# Patient Record
Sex: MALE | Race: WHITE | NOT HISPANIC OR LATINO | URBAN - METROPOLITAN AREA
[De-identification: names, ages, dates, MRNs, and addresses within clinical notes are randomized per-mention and may not be internally consistent; named-entity substitution may affect disease eponyms.]

---

## 2024-01-30 ENCOUNTER — HOSPITAL ENCOUNTER (EMERGENCY)
Facility: HOSPITAL | Age: 29
End: 2024-01-31
Attending: EMERGENCY MEDICINE

## 2024-01-30 DIAGNOSIS — R45.851 SUICIDAL IDEATION: ICD-10-CM

## 2024-01-30 DIAGNOSIS — Z00.8 MEDICAL CLEARANCE FOR PSYCHIATRIC ADMISSION: ICD-10-CM

## 2024-01-30 DIAGNOSIS — F32.A DEPRESSIVE EPISODE: Primary | ICD-10-CM

## 2024-01-30 LAB
AMPHETAMINES SERPL QL SCN: NEGATIVE
BARBITURATES UR QL: NEGATIVE
BENZODIAZ UR QL: POSITIVE
COCAINE UR QL: NEGATIVE
ETHANOL EXG-MCNC: NORMAL MG/DL
METHADONE UR QL: NEGATIVE
OPIATES UR QL SCN: NEGATIVE
OXYCODONE+OXYMORPHONE UR QL SCN: NEGATIVE
PCP UR QL: NEGATIVE
THC UR QL: POSITIVE

## 2024-01-30 PROCEDURE — 99285 EMERGENCY DEPT VISIT HI MDM: CPT

## 2024-01-30 PROCEDURE — 82075 ASSAY OF BREATH ETHANOL: CPT

## 2024-01-30 PROCEDURE — 80307 DRUG TEST PRSMV CHEM ANLYZR: CPT

## 2024-01-30 PROCEDURE — 99285 EMERGENCY DEPT VISIT HI MDM: CPT | Performed by: EMERGENCY MEDICINE

## 2024-01-30 RX ORDER — ALPRAZOLAM 0.25 MG/1
0.5 TABLET ORAL ONCE
Status: COMPLETED | OUTPATIENT
Start: 2024-01-30 | End: 2024-01-30

## 2024-01-30 RX ADMIN — ALPRAZOLAM 0.5 MG: 0.25 TABLET ORAL at 21:59

## 2024-01-31 ENCOUNTER — HOSPITAL ENCOUNTER (INPATIENT)
Facility: HOSPITAL | Age: 29
LOS: 40 days | Discharge: HOME | DRG: 751 | End: 2024-03-11
Attending: STUDENT IN AN ORGANIZED HEALTH CARE EDUCATION/TRAINING PROGRAM | Admitting: STUDENT IN AN ORGANIZED HEALTH CARE EDUCATION/TRAINING PROGRAM
Payer: COMMERCIAL

## 2024-01-31 VITALS
WEIGHT: 124.4 LBS | TEMPERATURE: 98 F | BODY MASS INDEX: 17.81 KG/M2 | OXYGEN SATURATION: 95 % | DIASTOLIC BLOOD PRESSURE: 72 MMHG | RESPIRATION RATE: 16 BRPM | HEIGHT: 70 IN | HEART RATE: 68 BPM | SYSTOLIC BLOOD PRESSURE: 130 MMHG

## 2024-01-31 DIAGNOSIS — Z00.8 MEDICAL CLEARANCE FOR PSYCHIATRIC ADMISSION: ICD-10-CM

## 2024-01-31 DIAGNOSIS — F41.9 ANXIETY: ICD-10-CM

## 2024-01-31 DIAGNOSIS — G47.00 INSOMNIA: ICD-10-CM

## 2024-01-31 DIAGNOSIS — F33.9 MAJOR DEPRESSIVE DISORDER, RECURRENT EPISODE (HCC): Primary | ICD-10-CM

## 2024-01-31 RX ORDER — MAGNESIUM HYDROXIDE/ALUMINUM HYDROXICE/SIMETHICONE 120; 1200; 1200 MG/30ML; MG/30ML; MG/30ML
30 SUSPENSION ORAL EVERY 4 HOURS PRN
Status: CANCELLED | OUTPATIENT
Start: 2024-01-31

## 2024-01-31 RX ORDER — HYDROXYZINE 50 MG/1
100 TABLET, FILM COATED ORAL
Status: DISCONTINUED | OUTPATIENT
Start: 2024-01-31 | End: 2024-03-11 | Stop reason: HOSPADM

## 2024-01-31 RX ORDER — AMOXICILLIN 250 MG
1 CAPSULE ORAL DAILY PRN
Status: DISCONTINUED | OUTPATIENT
Start: 2024-01-31 | End: 2024-03-11 | Stop reason: HOSPADM

## 2024-01-31 RX ORDER — HYDROXYZINE 50 MG/1
50 TABLET, FILM COATED ORAL
Status: DISCONTINUED | OUTPATIENT
Start: 2024-01-31 | End: 2024-03-11 | Stop reason: HOSPADM

## 2024-01-31 RX ORDER — POLYETHYLENE GLYCOL 3350 17 G/17G
17 POWDER, FOR SOLUTION ORAL DAILY PRN
Status: DISCONTINUED | OUTPATIENT
Start: 2024-01-31 | End: 2024-03-11 | Stop reason: HOSPADM

## 2024-01-31 RX ORDER — TRAZODONE HYDROCHLORIDE 50 MG/1
50 TABLET ORAL
Status: DISCONTINUED | OUTPATIENT
Start: 2024-01-31 | End: 2024-02-03

## 2024-01-31 RX ORDER — BENZTROPINE MESYLATE 1 MG/ML
1 INJECTION INTRAMUSCULAR; INTRAVENOUS
Status: CANCELLED | OUTPATIENT
Start: 2024-01-31

## 2024-01-31 RX ORDER — BENZTROPINE MESYLATE 1 MG/ML
1 INJECTION INTRAMUSCULAR; INTRAVENOUS
Status: DISCONTINUED | OUTPATIENT
Start: 2024-01-31 | End: 2024-03-11 | Stop reason: HOSPADM

## 2024-01-31 RX ORDER — HYDROXYZINE HYDROCHLORIDE 25 MG/1
25 TABLET, FILM COATED ORAL
Status: DISCONTINUED | OUTPATIENT
Start: 2024-01-31 | End: 2024-03-11 | Stop reason: HOSPADM

## 2024-01-31 RX ORDER — LORAZEPAM 2 MG/ML
2 INJECTION INTRAMUSCULAR EVERY 6 HOURS PRN
Status: CANCELLED | OUTPATIENT
Start: 2024-01-31

## 2024-01-31 RX ORDER — ALPRAZOLAM 0.25 MG/1
0.5 TABLET ORAL ONCE
Status: COMPLETED | OUTPATIENT
Start: 2024-01-31 | End: 2024-01-31

## 2024-01-31 RX ORDER — DIPHENHYDRAMINE HYDROCHLORIDE 50 MG/ML
50 INJECTION INTRAMUSCULAR; INTRAVENOUS EVERY 6 HOURS PRN
Status: CANCELLED | OUTPATIENT
Start: 2024-01-31

## 2024-01-31 RX ORDER — HYDROXYZINE HYDROCHLORIDE 25 MG/1
100 TABLET, FILM COATED ORAL
Status: CANCELLED | OUTPATIENT
Start: 2024-01-31

## 2024-01-31 RX ORDER — HALOPERIDOL 5 MG/1
5 TABLET ORAL
Status: DISCONTINUED | OUTPATIENT
Start: 2024-01-31 | End: 2024-03-11 | Stop reason: HOSPADM

## 2024-01-31 RX ORDER — HYDROXYZINE HYDROCHLORIDE 25 MG/1
50 TABLET, FILM COATED ORAL
Status: CANCELLED | OUTPATIENT
Start: 2024-01-31

## 2024-01-31 RX ORDER — HALOPERIDOL 1 MG/1
2.5 TABLET ORAL
Status: CANCELLED | OUTPATIENT
Start: 2024-01-31

## 2024-01-31 RX ORDER — HALOPERIDOL 5 MG/ML
5 INJECTION INTRAMUSCULAR
Status: DISCONTINUED | OUTPATIENT
Start: 2024-01-31 | End: 2024-03-11 | Stop reason: HOSPADM

## 2024-01-31 RX ORDER — BENZTROPINE MESYLATE 1 MG/1
1 TABLET ORAL
Status: DISCONTINUED | OUTPATIENT
Start: 2024-01-31 | End: 2024-03-11 | Stop reason: HOSPADM

## 2024-01-31 RX ORDER — ACETAMINOPHEN 325 MG/1
975 TABLET ORAL EVERY 6 HOURS PRN
Status: CANCELLED | OUTPATIENT
Start: 2024-01-31

## 2024-01-31 RX ORDER — LORAZEPAM 2 MG/ML
2 INJECTION INTRAMUSCULAR
Status: DISCONTINUED | OUTPATIENT
Start: 2024-01-31 | End: 2024-03-11 | Stop reason: HOSPADM

## 2024-01-31 RX ORDER — HYDROXYZINE HYDROCHLORIDE 25 MG/1
25 TABLET, FILM COATED ORAL
Status: CANCELLED | OUTPATIENT
Start: 2024-01-31

## 2024-01-31 RX ORDER — LORAZEPAM 2 MG/ML
2 INJECTION INTRAMUSCULAR
Status: CANCELLED | OUTPATIENT
Start: 2024-01-31

## 2024-01-31 RX ORDER — ACETAMINOPHEN 325 MG/1
975 TABLET ORAL EVERY 6 HOURS PRN
Status: DISCONTINUED | OUTPATIENT
Start: 2024-01-31 | End: 2024-03-11 | Stop reason: HOSPADM

## 2024-01-31 RX ORDER — BENZTROPINE MESYLATE 0.5 MG/1
1 TABLET ORAL
Status: CANCELLED | OUTPATIENT
Start: 2024-01-31

## 2024-01-31 RX ORDER — HALOPERIDOL 5 MG/1
5 TABLET ORAL
Status: CANCELLED | OUTPATIENT
Start: 2024-01-31

## 2024-01-31 RX ORDER — HALOPERIDOL 1 MG/1
1 TABLET ORAL EVERY 6 HOURS PRN
Status: CANCELLED | OUTPATIENT
Start: 2024-01-31

## 2024-01-31 RX ORDER — LORAZEPAM 2 MG/ML
1 INJECTION INTRAMUSCULAR
Status: DISCONTINUED | OUTPATIENT
Start: 2024-01-31 | End: 2024-03-11 | Stop reason: HOSPADM

## 2024-01-31 RX ORDER — ACETAMINOPHEN 325 MG/1
650 TABLET ORAL EVERY 4 HOURS PRN
Status: CANCELLED | OUTPATIENT
Start: 2024-01-31

## 2024-01-31 RX ORDER — BENZTROPINE MESYLATE 1 MG/ML
0.5 INJECTION INTRAMUSCULAR; INTRAVENOUS
Status: CANCELLED | OUTPATIENT
Start: 2024-01-31

## 2024-01-31 RX ORDER — MAGNESIUM HYDROXIDE/ALUMINUM HYDROXICE/SIMETHICONE 120; 1200; 1200 MG/30ML; MG/30ML; MG/30ML
30 SUSPENSION ORAL EVERY 4 HOURS PRN
Status: DISCONTINUED | OUTPATIENT
Start: 2024-01-31 | End: 2024-03-11 | Stop reason: HOSPADM

## 2024-01-31 RX ORDER — DIPHENHYDRAMINE HYDROCHLORIDE 50 MG/ML
50 INJECTION INTRAMUSCULAR; INTRAVENOUS EVERY 6 HOURS PRN
Status: DISCONTINUED | OUTPATIENT
Start: 2024-01-31 | End: 2024-03-11 | Stop reason: HOSPADM

## 2024-01-31 RX ORDER — HALOPERIDOL 5 MG/ML
5 INJECTION INTRAMUSCULAR
Status: CANCELLED | OUTPATIENT
Start: 2024-01-31

## 2024-01-31 RX ORDER — ACETAMINOPHEN 325 MG/1
650 TABLET ORAL EVERY 6 HOURS PRN
Status: DISCONTINUED | OUTPATIENT
Start: 2024-01-31 | End: 2024-03-11 | Stop reason: HOSPADM

## 2024-01-31 RX ORDER — BENZTROPINE MESYLATE 1 MG/ML
0.5 INJECTION INTRAMUSCULAR; INTRAVENOUS
Status: DISCONTINUED | OUTPATIENT
Start: 2024-01-31 | End: 2024-03-11 | Stop reason: HOSPADM

## 2024-01-31 RX ORDER — HALOPERIDOL 5 MG/ML
2.5 INJECTION INTRAMUSCULAR
Status: CANCELLED | OUTPATIENT
Start: 2024-01-31

## 2024-01-31 RX ORDER — PROPRANOLOL HYDROCHLORIDE 10 MG/1
10 TABLET ORAL EVERY 8 HOURS PRN
Status: DISCONTINUED | OUTPATIENT
Start: 2024-01-31 | End: 2024-03-11 | Stop reason: HOSPADM

## 2024-01-31 RX ORDER — ACETAMINOPHEN 325 MG/1
650 TABLET ORAL EVERY 4 HOURS PRN
Status: DISCONTINUED | OUTPATIENT
Start: 2024-01-31 | End: 2024-03-11 | Stop reason: HOSPADM

## 2024-01-31 RX ORDER — HALOPERIDOL 5 MG/ML
2.5 INJECTION INTRAMUSCULAR
Status: DISCONTINUED | OUTPATIENT
Start: 2024-01-31 | End: 2024-03-11 | Stop reason: HOSPADM

## 2024-01-31 RX ORDER — ACETAMINOPHEN 325 MG/1
650 TABLET ORAL EVERY 6 HOURS PRN
Status: CANCELLED | OUTPATIENT
Start: 2024-01-31

## 2024-01-31 RX ORDER — AMOXICILLIN 250 MG
1 CAPSULE ORAL DAILY PRN
Status: CANCELLED | OUTPATIENT
Start: 2024-01-31

## 2024-01-31 RX ORDER — HALOPERIDOL 1 MG/1
1 TABLET ORAL EVERY 6 HOURS PRN
Status: DISCONTINUED | OUTPATIENT
Start: 2024-01-31 | End: 2024-03-11 | Stop reason: HOSPADM

## 2024-01-31 RX ORDER — TRAZODONE HYDROCHLORIDE 50 MG/1
50 TABLET ORAL
Status: CANCELLED | OUTPATIENT
Start: 2024-01-31

## 2024-01-31 RX ORDER — POLYETHYLENE GLYCOL 3350 17 G/17G
17 POWDER, FOR SOLUTION ORAL DAILY PRN
Status: CANCELLED | OUTPATIENT
Start: 2024-01-31

## 2024-01-31 RX ORDER — PROPRANOLOL HYDROCHLORIDE 20 MG/1
10 TABLET ORAL EVERY 8 HOURS PRN
Status: CANCELLED | OUTPATIENT
Start: 2024-01-31

## 2024-01-31 RX ORDER — LORAZEPAM 2 MG/ML
1 INJECTION INTRAMUSCULAR
Status: CANCELLED | OUTPATIENT
Start: 2024-01-31

## 2024-01-31 RX ORDER — LORAZEPAM 2 MG/ML
2 INJECTION INTRAMUSCULAR EVERY 6 HOURS PRN
Status: DISCONTINUED | OUTPATIENT
Start: 2024-01-31 | End: 2024-03-11 | Stop reason: HOSPADM

## 2024-01-31 RX ADMIN — TRAZODONE HYDROCHLORIDE 50 MG: 50 TABLET ORAL at 21:14

## 2024-01-31 RX ADMIN — ALPRAZOLAM 0.5 MG: 0.25 TABLET ORAL at 09:52

## 2024-01-31 NOTE — PLAN OF CARE
Problem: Ineffective Coping  Goal: Identifies ineffective coping skills  Outcome: Not Progressing  Goal: Identifies healthy coping skills  Outcome: Not Progressing  Goal: Demonstrates healthy coping skills  Outcome: Not Progressing  Goal: Participates in unit activities  Description: Interventions:  - Provide therapeutic environment   - Provide required programming   - Redirect inappropriate behaviors   Outcome: Not Progressing  Goal: Patient/Family participate in treatment and DC plans  Description: Interventions:  - Provide therapeutic environment  Outcome: Not Progressing  Goal: Patient/Family verbalizes awareness of resources  Outcome: Not Progressing  Goal: Understands least restrictive measures  Description: Interventions:  - Utilize least restrictive behavior  Outcome: Not Progressing  Goal: Free from restraint events  Description: - Utilize least restrictive measures   - Provide behavioral interventions   - Redirect inappropriate behaviors   Outcome: Not Progressing     Problem: Depression  Goal: Treatment Goal: Demonstrate behavioral control of depressive symptoms, verbalize feelings of improved mood/affect, and adopt new coping skills prior to discharge  Outcome: Not Progressing  Goal: Verbalize thoughts and feelings  Description: Interventions:  - Assess and re-assess patient's level of risk   - Engage patient in 1:1 interactions, daily, for a minimum of 15 minutes   - Encourage patient to express feelings, fears, frustrations, hopes   Outcome: Not Progressing  Goal: Refrain from harming self  Description: Interventions:  - Monitor patient closely, per order   - Supervise medication ingestion, monitor effects and side effects   Outcome: Not Progressing  Goal: Refrain from isolation  Description: Interventions:  - Develop a trusting relationship   - Encourage socialization   Outcome: Not Progressing  Goal: Refrain from self-neglect  Outcome: Not Progressing  Goal: Attend and participate in unit  activities, including therapeutic, recreational, and educational groups  Description: Interventions:  - Provide therapeutic and educational activities daily, encourage attendance and participation, and document same in the medical record   Outcome: Not Progressing  Goal: Complete daily ADLs, including personal hygiene independently, as able  Description: Interventions:  - Observe, teach, and assist patient with ADLS  -  Monitor and promote a balance of rest/activity, with adequate nutrition and elimination   Outcome: Not Progressing     Problem: Anxiety  Goal: Anxiety is at manageable level  Description: Interventions:  - Assess and monitor patient's anxiety level.   - Monitor for signs and symptoms (heart palpitations, chest pain, shortness of breath, headaches, nausea, feeling jumpy, restlessness, irritable, apprehensive).   - Collaborate with interdisciplinary team and initiate plan and interventions as ordered.  - Warren patient to unit/surroundings  - Explain treatment plan  - Encourage participation in care  - Encourage verbalization of concerns/fears  - Identify coping mechanisms  - Assist in developing anxiety-reducing skills  - Administer/offer alternative therapies  - Limit or eliminate stimulants  Outcome: Not Progressing     Problem: Ineffective Coping  Goal: Cooperates with admission process  Description: Interventions:   - Complete admission process  Outcome: Completed
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
## 2024-01-31 NOTE — ED ATTENDING ATTESTATION
1/30/2024  I, Lucio Simeon MD, saw and evaluated the patient. I have discussed the patient with the resident/non-physician practitioner and agree with the resident's/non-physician practitioner's findings, Plan of Care, and MDM as documented in the resident's/non-physician practitioner's note, except where noted. All available labs and Radiology studies were reviewed.  I was present for key portions of any procedure(s) performed by the resident/non-physician practitioner and I was immediately available to provide assistance.       At this point I agree with the current assessment done in the Emergency Department.  I have conducted an independent evaluation of this patient a history and physical is as follows: Patient presents emergency department for help with overall dyspnea.  He states he may have a diagnosis of depression and anxiety.  He sometimes lives in [place] and sometimes lives here.  He denies any current plan for suicide or homicide.  He states he is frustrated with multiple aspects of his life.    Patient states he is here to establish psychiatric care and for help with resources in the area.    Crisis team saw and evaluated patient, patient agreed to inpatient psychiatric care through 201 process.    Patient medically clear for crisis evaluation and inpatient psychiatric care as requested.    Results Reviewed       Procedure Component Value Units Date/Time    Rapid drug screen, urine [[id number]]  (Abnormal) Collected: 01/30/24 1848    Lab Status: Final result Specimen: Urine, Clean Catch Updated: 01/30/24 1932     Amph/Meth UR Negative     Barbiturate Ur Negative     Benzodiazepine Urine Positive     Cocaine Urine Negative     Methadone Urine Negative     Opiate Urine Negative     PCP Ur Negative     THC Urine Positive     Oxycodone Urine Negative    Narrative:      Presumptive report. If requested, specimen will be sent to reference lab for confirmation.  FOR MEDICAL PURPOSES ONLY.   IF  CONFIRMATION NEEDED PLEASE CONTACT THE LAB WITHIN 5 DAYS.    Drug Screen Cutoff Levels:  AMPHETAMINE/METHAMPHETAMINES  1000 ng/mL  BARBITURATES     200 ng/mL  BENZODIAZEPINES     200 ng/mL  COCAINE      300 ng/mL  METHADONE      300 ng/mL  OPIATES      300 ng/mL  PHENCYCLIDINE     25 ng/mL  THC       50 ng/mL  OXYCODONE      100 ng/mL    POCT alcohol breath test [[id number]]  (Normal) Resulted: 01/30/24 1846    Lab Status: Final result Updated: 01/30/24 1846     EXTBreath Alcohol 0.00%              ED Course         Critical Care Time  Procedures

## 2024-01-31 NOTE — ED NOTES
[facility]  01/31/24 6403

## 2024-01-31 NOTE — ED NOTES
Patient is accepted at [facility]  Patient is accepted by Dr. Sabrina Davis, Intake    Transportation is arranged with [facility].         Nurse report is to be called to [phone] prior to patient transfer.

## 2024-01-31 NOTE — ED NOTES
Pt changed into paper scrubs. Belongings checked and put in SH locker.      Jose Hernández RN  01/30/24 3565

## 2024-01-31 NOTE — ED NOTES
[facility]    Nurse report is to be called to [phone] prior to patient transfer.      No

## 2024-01-31 NOTE — EMTALA/ACUTE CARE TRANSFER
[facility] EMERGENCY DEPARTMENT  [address] [facility]  [place] [address]  Dept: [phone]      EMTALA TRANSFER CONSENT    NAME Shaquille Ackerman                                         1995                              MRN [id number]    I have been informed of my rights regarding examination, treatment, and transfer   by Dr. Luis Enrique Fajardo DO    Benefits:      Risks:        Transfer Request   I acknowledge that my medical condition has been evaluated and explained to me by the emergency department physician or other qualified medical person and/or my attending physician who has recommended and offered to me further medical examination and treatment. I understand the Hospital's obligation with respect to the treatment and stabilization of my emergency medical condition. I nevertheless request to be transferred. I release the Hospital, the doctor, and any other persons caring for me from all responsibility or liability for any injury or ill effects that may result from my transfer and agree to accept all responsibility for the consequences of my choice to transfer, rather than receive stabilizing treatment at the Hospital. I understand that because the transfer is my request, my insurance may not provide reimbursement for the services.  The Hospital will assist and direct me and my family in how to make arrangements for transfer, but the hospital is not liable for any fees charged by the transport service.  In spite of this understanding, I refuse to consent to further medical examination and treatment which has been offered to me, and request transfer to  . I authorize the performance of emergency medical procedures and treatments upon me in both transit and upon arrival at the receiving facility.  Additionally, I authorize the release of any and all medical records to the receiving facility and request they be transported with me, if possible.    I authorize the performance of emergency  medical procedures and treatments upon me in both transit and upon arrival at the receiving facility.  Additionally, I authorize the release of any and all medical records to the receiving facility and request they be transported with me, if possible.  I understand that the safest mode of transportation during a medical emergency is an ambulance and that the Hospital advocates the use of this mode of transport. Risks of traveling to the receiving facility by car, including absence of medical control, life sustaining equipment, such as oxygen, and medical personnel has been explained to me and I fully understand them.    (WILLIE CORRECT BOX BELOW)  [  ]  I consent to the stated transfer and to be transported by ambulance/helicopter.  [  ]  I consent to the stated transfer, but refuse transportation by ambulance and accept full responsibility for my transportation by car.  I understand the risks of non-ambulance transfers and I exonerate the Hospital and its staff from any deterioration in my condition that results from this refusal.    X___________________________________________    DATE  24  TIME________  Signature of patient or legally responsible individual signing on patient behalf           RELATIONSHIP TO PATIENT_________________________          Provider Certification    NAME Shaquille Ackerman                                         1995                              MRN [id number]    A medical screening exam was performed on the above named patient.  Based on the examination:    Condition Necessitating Transfer The primary encounter diagnosis was Depressive episode. Diagnoses of Suicidal ideation and Medical clearance for psychiatric admission were also pertinent to this visit.    Patient Condition:      Reason for Transfer:      Transfer Requirements: Facility     Space available and qualified personnel available for treatment as acknowledged by    Agreed to accept transfer and to provide appropriate  medical treatment as acknowledged by          Appropriate medical records of the examination and treatment of the patient are provided at the time of transfer   STAFF INITIAL WHEN COMPLETED _______  Transfer will be performed by qualified personnel from    and appropriate transfer equipment as required, including the use of necessary and appropriate life support measures.    Provider Certification: I have examined the patient and explained the following risks and benefits of being transferred/refusing transfer to the patient/family:         Based on these reasonable risks and benefits to the patient and/or the unborn child(myra), and based upon the information available at the time of the patient’s examination, I certify that the medical benefits reasonably to be expected from the provision of appropriate medical treatments at another medical facility outweigh the increasing risks, if any, to the individual’s medical condition, and in the case of labor to the unborn child, from effecting the transfer.    X____________________________________________ DATE 01/31/24        TIME_______      ORIGINAL - SEND TO MEDICAL RECORDS   COPY - SEND WITH PATIENT DURING TRANSFER

## 2024-01-31 NOTE — ED NOTES
Patient wishes to take a shower and does not want any breakfast     Najma Chairez  01/31/24 0729

## 2024-01-31 NOTE — ED CARE HANDOFF
Emergency Department Sign Out Note        Sign out and transfer of care from Dr Atkins. See Separate Emergency Department note.     The patient, Shaquille Ackerman, was evaluated by the previous provider for intense rage, SI ideations.    Workup Completed:  201 signed  Medically cleared  Evaluated by crisis    ED Course / Workup Pending (followup):  Pending inpatient placement.                                   ED Course as of 02/04/24 0316 Wed Jan 31, 2024   0027 Sign out from Dr Atkins  27 yo male with High functionioning ASD, intense rage, presents with SI,    Signed 201. Crisis evaluated him and awaiting inpatient placement. Medically clear.     PRN Xanax     Procedures  Medical Decision Making  Pt was awaiting placement at end of my shift, and case was signed out by attending to Dr Fajardo.     Amount and/or Complexity of Data Reviewed  Labs: ordered.    Risk  Prescription drug management.            Disposition  Final diagnoses:   Depressive episode   Suicidal ideation     Time reflects when diagnosis was documented in both MDM as applicable and the Disposition within this note       Time User Action Codes Description Comment    1/30/2024 10:49 PM Beka Atkins [F32.A] Depressive episode     1/30/2024 10:50 PM Beka Atkins Add [R45.851] Suicidal ideation     1/31/2024 10:33 AM Zakia Resendez Add [Z00.8] Medical clearance for psychiatric admission           ED Disposition       ED Disposition   Transfer to Another Facility-In Network    Condition   --    Date/Time   Wed Jan 31, 2024 12:48 PM    Comment   Shaquille Ackerman should be transferred out to [place].               MD Documentation      Flowsheet Row Most Recent Value   Sending MD Dr. Lucio Simeon          Follow-up Information    None       There are no discharge medications for this patient.    No discharge procedures on file.       ED Provider  Electronically Signed by     Hope Kraft MD  02/04/24 0316

## 2024-01-31 NOTE — ED NOTES
Nancie Schoeneberger with [facility] called, requesting an update and to advise that the patient has been referred to [facility] for services.  She notes the patient is on the autism spectrum and had been involuntarily committed under the [place] Act while living in [place].
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
## 2024-02-01 PROBLEM — F33.9 MAJOR DEPRESSIVE DISORDER, RECURRENT EPISODE (HCC): Status: ACTIVE | Noted: 2024-02-01

## 2024-02-01 PROBLEM — F12.10 MARIJUANA ABUSE: Status: ACTIVE | Noted: 2024-02-01

## 2024-02-01 PROBLEM — R17 SERUM TOTAL BILIRUBIN ELEVATED: Status: ACTIVE | Noted: 2024-02-01

## 2024-02-01 PROBLEM — Z00.8 MEDICAL CLEARANCE FOR PSYCHIATRIC ADMISSION: Status: ACTIVE | Noted: 2024-02-01

## 2024-02-01 LAB
ALBUMIN SERPL BCP-MCNC: 4.5 G/DL (ref 3.5–5)
ALP SERPL-CCNC: 43 U/L (ref 34–104)
ALT SERPL W P-5'-P-CCNC: 12 U/L (ref 7–52)
ANION GAP SERPL CALCULATED.3IONS-SCNC: 9 MMOL/L
AST SERPL W P-5'-P-CCNC: 26 U/L (ref 13–39)
ATRIAL RATE: 64 BPM
BASOPHILS # BLD AUTO: 0.02 THOUSANDS/ÂΜL (ref 0–0.1)
BASOPHILS NFR BLD AUTO: 1 % (ref 0–1)
BILIRUB SERPL-MCNC: 1.47 MG/DL (ref 0.2–1)
BUN SERPL-MCNC: 16 MG/DL (ref 5–25)
CALCIUM SERPL-MCNC: 9.2 MG/DL (ref 8.4–10.2)
CHLORIDE SERPL-SCNC: 105 MMOL/L (ref 96–108)
CO2 SERPL-SCNC: 25 MMOL/L (ref 21–32)
CREAT SERPL-MCNC: 1.12 MG/DL (ref 0.6–1.3)
EOSINOPHIL # BLD AUTO: 0.01 THOUSAND/ÂΜL (ref 0–0.61)
EOSINOPHIL NFR BLD AUTO: 0 % (ref 0–6)
ERYTHROCYTE [DISTWIDTH] IN BLOOD BY AUTOMATED COUNT: 11.9 % (ref 11.6–15.1)
GFR SERPL CREATININE-BSD FRML MDRD: 87 ML/MIN/1.73SQ M
GLUCOSE P FAST SERPL-MCNC: 77 MG/DL (ref 65–99)
GLUCOSE SERPL-MCNC: 77 MG/DL (ref 65–140)
HCT VFR BLD AUTO: 44.4 % (ref 36.5–49.3)
HGB BLD-MCNC: 15.3 G/DL (ref 12–17)
IMM GRANULOCYTES # BLD AUTO: 0.01 THOUSAND/UL (ref 0–0.2)
IMM GRANULOCYTES NFR BLD AUTO: 0 % (ref 0–2)
LYMPHOCYTES # BLD AUTO: 1.14 THOUSANDS/ÂΜL (ref 0.6–4.47)
LYMPHOCYTES NFR BLD AUTO: 26 % (ref 14–44)
MCH RBC QN AUTO: 31.4 PG (ref 26.8–34.3)
MCHC RBC AUTO-ENTMCNC: 34.5 G/DL (ref 31.4–37.4)
MCV RBC AUTO: 91 FL (ref 82–98)
MONOCYTES # BLD AUTO: 0.37 THOUSAND/ÂΜL (ref 0.17–1.22)
MONOCYTES NFR BLD AUTO: 8 % (ref 4–12)
NEUTROPHILS # BLD AUTO: 2.87 THOUSANDS/ÂΜL (ref 1.85–7.62)
NEUTS SEG NFR BLD AUTO: 65 % (ref 43–75)
NRBC BLD AUTO-RTO: 0 /100 WBCS
P AXIS: 57 DEGREES
PLATELET # BLD AUTO: 177 THOUSANDS/UL (ref 149–390)
PMV BLD AUTO: 9.7 FL (ref 8.9–12.7)
POTASSIUM SERPL-SCNC: 3.9 MMOL/L (ref 3.5–5.3)
PR INTERVAL: 148 MS
PROT SERPL-MCNC: 6.6 G/DL (ref 6.4–8.4)
QRS AXIS: 19 DEGREES
QRSD INTERVAL: 94 MS
QT INTERVAL: 402 MS
QTC INTERVAL: 414 MS
RBC # BLD AUTO: 4.88 MILLION/UL (ref 3.88–5.62)
SODIUM SERPL-SCNC: 139 MMOL/L (ref 135–147)
T WAVE AXIS: 19 DEGREES
TSH SERPL DL<=0.05 MIU/L-ACNC: 0.97 UIU/ML (ref 0.45–4.5)
VENTRICULAR RATE: 64 BPM
VIT B12 SERPL-MCNC: 720 PG/ML (ref 180–914)
WBC # BLD AUTO: 4.42 THOUSAND/UL (ref 4.31–10.16)

## 2024-02-01 PROCEDURE — 80053 COMPREHEN METABOLIC PANEL: CPT

## 2024-02-01 PROCEDURE — 84443 ASSAY THYROID STIM HORMONE: CPT | Performed by: PSYCHIATRY & NEUROLOGY

## 2024-02-01 PROCEDURE — 82607 VITAMIN B-12: CPT

## 2024-02-01 PROCEDURE — 93005 ELECTROCARDIOGRAM TRACING: CPT

## 2024-02-01 PROCEDURE — 99223 1ST HOSP IP/OBS HIGH 75: CPT | Performed by: STUDENT IN AN ORGANIZED HEALTH CARE EDUCATION/TRAINING PROGRAM

## 2024-02-01 PROCEDURE — 99253 IP/OBS CNSLTJ NEW/EST LOW 45: CPT | Performed by: NURSE PRACTITIONER

## 2024-02-01 PROCEDURE — 82652 VIT D 1 25-DIHYDROXY: CPT

## 2024-02-01 PROCEDURE — 85025 COMPLETE CBC W/AUTO DIFF WBC: CPT

## 2024-02-01 PROCEDURE — 93010 ELECTROCARDIOGRAM REPORT: CPT | Performed by: STUDENT IN AN ORGANIZED HEALTH CARE EDUCATION/TRAINING PROGRAM

## 2024-02-01 RX ORDER — ESCITALOPRAM OXALATE 5 MG/1
5 TABLET ORAL DAILY
Status: DISCONTINUED | OUTPATIENT
Start: 2024-02-01 | End: 2024-02-03

## 2024-02-01 RX ADMIN — HYDROXYZINE HYDROCHLORIDE 100 MG: 50 TABLET, FILM COATED ORAL at 18:36

## 2024-02-01 RX ADMIN — TRAZODONE HYDROCHLORIDE 50 MG: 50 TABLET ORAL at 21:07

## 2024-02-01 RX ADMIN — ESCITALOPRAM OXALATE 5 MG: 5 TABLET, FILM COATED ORAL at 16:14

## 2024-02-01 RX ADMIN — HALOPERIDOL 5 MG: 5 TABLET ORAL at 18:36
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
## 2024-02-01 NOTE — NURSING NOTE
Pt appears anxious this morning, but is pleasant and cooperative upon approach. Pt is medication and meal compliant. Denies SI, HI, AH, and VH. Denies any needs at this time.
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
## 2024-02-01 NOTE — NURSING NOTE
Pt mainly isolative to room, out for needs only. Denies SI, HI and hallucinations. Appears depressed. No anxiety reported and appears calm on approach. Requested and given trazodone 50mg for sleep at 2114 and is currently in bed resting with eyes closed. Safety checks continue.

## 2024-02-01 NOTE — NURSING NOTE
"Pt started to scream in the arguello way and become tangential about \"his next steps, the failed health system, and to fuck society. \" Pt was also threatening stating \"I can hurt people if I want to.\" Broset Score 4 and Coronel Scale 29. PRN of PO 100mg atarax and PO 5mg haldol given @1836.  "

## 2024-02-01 NOTE — CONSULTS
[facility]  Consult  Name: Shaquille Ackerman 28 y.o. male I MRN: [id number]  Unit/Bed#: [facility] 346-02 I Date of Admission: 1/31/2024   Date of Service: 2/1/2024 I Hospital Day: 1    Inpatient consult for Medical Clearance for  patient  Consult performed by: MIRNA Gambino  Consult ordered by: Zakia Resendez,         Assessment/Plan   Medical clearance for psychiatric admission  Assessment & Plan  Patient is medically cleared for admission to [facility] and treatment of underlying psychiatric illness based on available results  No acute medical needs. Medicine will sign off. Please call with additional questions or concerns    Serum total bilirubin elevated  Assessment & Plan  T. bili elevated at 1.47  LFTs unremarkable.  Abdominal exam benign.  Will repeat a CMP in 2 days    Marijuana abuse  Assessment & Plan  UDS (+) for THC  Substance cessation education and counseling     * Major depressive disorder, recurrent episode (HCC)  Assessment & Plan  [facility]  Management per primary service            Recommendations for Discharge:  SLIM will sign off - please call with questions or concerns.  Follow up with PCP upon discharge.     Counseling / Coordination of Care Time: 30 minutes.  Greater than 50% of total time spent on patient counseling and coordination of care.    Collaboration of Care: Were Recommendations Directly Discussed with Primary Treatment Team? - Yes     History of Present Illness:    Shaquille Ackerman is a 28 y.o. male who is originally admitted to the psychiatric service due to suicide ideation and increased anxiety. We are consulted for medical clearance for psychiatric hospitalization and medical management.  Initially, patient presented to [facility] emergency department.  Patient was seen by crisis.  He noted that he lives with his mother.  They split their time between [place] and [place].  Patient noted he has more support and psychiatric support  "in [place].  Patient prefers to be in [place].  Patient had \"a meltdown at home.\"  Patient signed a 201 and was transferred to [facility] for inpatient behavioral health treatment.    Review of Systems:    Review of Systems   Constitutional:  Negative for appetite change and chills.   HENT:  Negative for congestion and sore throat.    Eyes:  Negative for visual disturbance.   Respiratory:  Negative for cough and shortness of breath.    Cardiovascular:  Negative for chest pain.   Gastrointestinal:  Negative for abdominal pain, constipation, diarrhea, nausea and vomiting.   Genitourinary:  Negative for difficulty urinating.   Musculoskeletal:  Negative for gait problem.   Skin:  Negative for wound.   Neurological:  Negative for dizziness, light-headedness and headaches.   Psychiatric/Behavioral:  The patient is nervous/anxious.        Past Medical and Surgical History:     Past Medical History:   Diagnosis Date    Autism spectrum disorder        No past surgical history on file.    Meds/Allergies:    all medications and allergies reviewed    Allergies: No Known Allergies    Social History:     Marital Status: Single    Substance Use History:   Social History     Substance and Sexual Activity   Alcohol Use Yes    Alcohol/week: 2.0 standard drinks of alcohol    Types: 1 Cans of beer, 1 Shots of liquor per week    Comment: Socially     Social History     Tobacco Use   Smoking Status Never    Passive exposure: Never   Smokeless Tobacco Never     Social History     Substance and Sexual Activity   Drug Use Yes    Types: Marijuana, Benzodiazepines       Family History:    Family History   Problem Relation Age of Onset    Bipolar disorder Father     Schizophrenia Father        Physical Exam:     Vitals:   Blood Pressure: 148/62 (02/01/24 0739)  Pulse: 74 (02/01/24 0739)  Temperature: 97.9 °F (36.6 °C) (02/01/24 0739)  Temp Source: Temporal (02/01/24 0739)  Respirations: 16 (02/01/24 0739)  Height: 5' 10\" (177.8 cm) " "(01/31/24 1529)  Weight - Scale: 53.5 kg (118 lb) (01/31/24 1529)  SpO2: 98 % (02/01/24 0739)    Physical Exam  Vitals and nursing note reviewed.   Constitutional:       General: He is not in acute distress.     Appearance: He is not toxic-appearing or diaphoretic.   HENT:      Head: Normocephalic.      Mouth/Throat:      Mouth: Mucous membranes are moist.   Eyes:      Conjunctiva/sclera: Conjunctivae normal.   Cardiovascular:      Rate and Rhythm: Normal rate.   Pulmonary:      Effort: Pulmonary effort is normal.      Breath sounds: Normal breath sounds.   Abdominal:      General: Bowel sounds are normal.      Palpations: Abdomen is soft.   Musculoskeletal:         General: Normal range of motion.      Cervical back: Normal range of motion.      Right lower leg: No edema.      Left lower leg: No edema.   Skin:     General: Skin is warm and dry.      Capillary Refill: Capillary refill takes less than 2 seconds.   Neurological:      Mental Status: He is alert and oriented to person, place, and time. Mental status is at baseline.   Psychiatric:         Mood and Affect: Mood is anxious.         Speech: Speech normal.         Behavior: Behavior is cooperative.         Additional Data:     Lab Results:     Results from last 7 days   Lab Units 02/01/24  0648   WBC Thousand/uL 4.42   HEMOGLOBIN g/dL 15.3   HEMATOCRIT % 44.4   PLATELETS Thousands/uL 177   NEUTROS PCT % 65   LYMPHS PCT % 26   MONOS PCT % 8   EOS PCT % 0     Results from last 7 days   Lab Units 02/01/24  0648   SODIUM mmol/L 139   POTASSIUM mmol/L 3.9   CHLORIDE mmol/L 105   CO2 mmol/L 25   BUN mg/dL 16   CREATININE mg/dL 1.12   ANION GAP mmol/L 9   CALCIUM mg/dL 9.2   ALBUMIN g/dL 4.5   TOTAL BILIRUBIN mg/dL 1.47*   ALK PHOS U/L 43   ALT U/L 12   AST U/L 26   GLUCOSE RANDOM mg/dL 77             No results found for: \"HGBA1C\"            Imaging: I have personally reviewed pertinent reports.      No orders to display       EKG, Pathology, and Other Studies " Reviewed on Admission:   EKG: see above documentation    ** Please Note: This note has been constructed using a voice recognition system. **

## 2024-02-01 NOTE — TREATMENT PLAN
TREATMENT PLAN REVIEW - Behavioral Health Shaquille Ackerman 28 y.o. 1995 male MRN: [id number]    [facility] 3B [facility] Room / Bed: [facility] 346/[facility] 346-02 Encounter: [id number]          Admit Date/Time:  1/31/2024  3:09 PM    Treatment Team:   MD Viki Cota LPC Dabriel L Ramos, RN Sarah Alderfer Sean Washburn Karissa Marie Kormandy, CRNP Tara Nickens Jennifer King Tracey Renfer, COTA    Diagnosis: Principal Problem:    Major depressive disorder, recurrent episode (HCC)      Patient Strengths/Assets: ability for insight, cooperative, communication skills, motivated, negotiates basic needs, patient is on a voluntary commitment, patient is willing to work on problems, reasoning ability    Patient Barriers/Limitations: difficulty adapting, financial instability, limited education, limited support system, marital/family conflict, poor past treatment response, self-care deficit    Short Term Goals: decrease in depressive symptoms, decrease in anxiety symptoms, decrease in suicidal thoughts, decrease in self abusive behaviors, ability to stay safe on the unit, ability to stay free of restraints, improvement in ability to express basic needs, improvement in insight, improvement in reasoning ability, improvement in self care, sleep improvement, improvement in appetite, mood stabilization, increase in group attendance, increase in socialization with peers on the unit, acceptance of need for psychiatric treatment, acceptance of psychiatric medications    Long Term Goals: improvement in depression, improvement in anxiety, free of suicidal thoughts, free of homicidal thoughts, no self abusive behavior, improvement in reasoning ability, improved insight, able to express basic needs, acceptance of need for psychiatric medications, acceptance of need for psychiatric treatment, acceptance of need for psychiatric follow up after discharge, acceptance of  psychiatric diagnosis, adequate self care, adequate sleep, adequate appetite, appropriate interaction with peers, stable living arrangements upon discharge    Progress Towards Goals: starting psychiatric medications as prescribed, progressing, insight is improving, no longer suicidal    Recommended Treatment: medication management, patient medication education, group therapy, milieu therapy, continued Behavioral Health psychiatric evaluation/assessment process    Treatment Frequency: daily medication monitoring, group and milieu therapy daily, monitoring through interdisciplinary rounds, monitoring through weekly patient care conferences    Expected Discharge Date:  2/12/24    Discharge Plan: discharge to home, referral for outpatient medication management with a psychiatrist, referral for outpatient psychotherapy    Treatment Plan Created/Updated By: Gregory Bennett MD

## 2024-02-01 NOTE — PLAN OF CARE
Problem: Ineffective Coping  Goal: Identifies healthy coping skills  Outcome: Progressing  Goal: Demonstrates healthy coping skills  Outcome: Progressing  Goal: Patient/Family participate in treatment and DC plans  Description: Interventions:  - Provide therapeutic environment  Outcome: Progressing  Goal: Patient/Family verbalizes awareness of resources  Outcome: Progressing  Goal: Understands least restrictive measures  Description: Interventions:  - Utilize least restrictive behavior  Outcome: Progressing  Goal: Free from restraint events  Description: - Utilize least restrictive measures   - Provide behavioral interventions   - Redirect inappropriate behaviors   Outcome: Progressing     Problem: Depression  Goal: Treatment Goal: Demonstrate behavioral control of depressive symptoms, verbalize feelings of improved mood/affect, and adopt new coping skills prior to discharge  Outcome: Progressing  Goal: Verbalize thoughts and feelings  Description: Interventions:  - Assess and re-assess patient's level of risk   - Engage patient in 1:1 interactions, daily, for a minimum of 15 minutes   - Encourage patient to express feelings, fears, frustrations, hopes   Outcome: Progressing  Goal: Refrain from harming self  Description: Interventions:  - Monitor patient closely, per order   - Supervise medication ingestion, monitor effects and side effects   Outcome: Progressing  Goal: Refrain from isolation  Description: Interventions:  - Develop a trusting relationship   - Encourage socialization   Outcome: Progressing  Goal: Refrain from self-neglect  Outcome: Progressing  Goal: Complete daily ADLs, including personal hygiene independently, as able  Description: Interventions:  - Observe, teach, and assist patient with ADLS  -  Monitor and promote a balance of rest/activity, with adequate nutrition and elimination   Outcome: Progressing     Problem: Anxiety  Goal: Anxiety is at manageable level  Description: Interventions:  -  Assess and monitor patient's anxiety level.   - Monitor for signs and symptoms (heart palpitations, chest pain, shortness of breath, headaches, nausea, feeling jumpy, restlessness, irritable, apprehensive).   - Collaborate with interdisciplinary team and initiate plan and interventions as ordered.  - [place] patient to unit/surroundings  - Explain treatment plan  - Encourage participation in care  - Encourage verbalization of concerns/fears  - Identify coping mechanisms  - Assist in developing anxiety-reducing skills  - Administer/offer alternative therapies  - Limit or eliminate stimulants  Outcome: Progressing     Problem: Ineffective Coping  Goal: Identifies ineffective coping skills  Outcome: Not Progressing  Goal: Participates in unit activities  Description: Interventions:  - Provide therapeutic environment   - Provide required programming   - Redirect inappropriate behaviors   Outcome: Not Progressing     Problem: Depression  Goal: Attend and participate in unit activities, including therapeutic, recreational, and educational groups  Description: Interventions:  - Provide therapeutic and educational activities daily, encourage attendance and participation, and document same in the medical record   Outcome: Not Progressing

## 2024-02-02 LAB — 1,25(OH)2D3 SERPL-MCNC: 45.9 PG/ML (ref 24.8–81.5)

## 2024-02-02 PROCEDURE — 99232 SBSQ HOSP IP/OBS MODERATE 35: CPT | Performed by: PSYCHIATRY & NEUROLOGY

## 2024-02-02 RX ADMIN — ESCITALOPRAM OXALATE 5 MG: 5 TABLET, FILM COATED ORAL at 09:19

## 2024-02-02 RX ADMIN — TRAZODONE HYDROCHLORIDE 50 MG: 50 TABLET ORAL at 22:07

## 2024-02-02 NOTE — PLAN OF CARE
Problem: Ineffective Coping  Goal: Identifies ineffective coping skills  Outcome: Progressing  Goal: Identifies healthy coping skills  Outcome: Progressing  Goal: Demonstrates healthy coping skills  Outcome: Progressing  Goal: Participates in unit activities  Description: Interventions:  - Provide therapeutic environment   - Provide required programming   - Redirect inappropriate behaviors   Outcome: Progressing  Goal: Patient/Family participate in treatment and DC plans  Description: Interventions:  - Provide therapeutic environment  Outcome: Progressing  Goal: Patient/Family verbalizes awareness of resources  Outcome: Progressing  Goal: Understands least restrictive measures  Description: Interventions:  - Utilize least restrictive behavior  Outcome: Progressing  Goal: Free from restraint events  Description: - Utilize least restrictive measures   - Provide behavioral interventions   - Redirect inappropriate behaviors   Outcome: Progressing     Problem: Depression  Goal: Treatment Goal: Demonstrate behavioral control of depressive symptoms, verbalize feelings of improved mood/affect, and adopt new coping skills prior to discharge  Outcome: Progressing  Goal: Verbalize thoughts and feelings  Description: Interventions:  - Assess and re-assess patient's level of risk   - Engage patient in 1:1 interactions, daily, for a minimum of 15 minutes   - Encourage patient to express feelings, fears, frustrations, hopes   Outcome: Progressing  Goal: Refrain from harming self  Description: Interventions:  - Monitor patient closely, per order   - Supervise medication ingestion, monitor effects and side effects   Outcome: Progressing  Goal: Refrain from isolation  Description: Interventions:  - Develop a trusting relationship   - Encourage socialization   Outcome: Progressing  Goal: Refrain from self-neglect  Outcome: Progressing  Goal: Attend and participate in unit activities, including therapeutic, recreational, and  educational groups  Description: Interventions:  - Provide therapeutic and educational activities daily, encourage attendance and participation, and document same in the medical record   Outcome: Progressing  Goal: Complete daily ADLs, including personal hygiene independently, as able  Description: Interventions:  - Observe, teach, and assist patient with ADLS  -  Monitor and promote a balance of rest/activity, with adequate nutrition and elimination   Outcome: Progressing     Problem: Anxiety  Goal: Anxiety is at manageable level  Description: Interventions:  - Assess and monitor patient's anxiety level.   - Monitor for signs and symptoms (heart palpitations, chest pain, shortness of breath, headaches, nausea, feeling jumpy, restlessness, irritable, apprehensive).   - Collaborate with interdisciplinary team and initiate plan and interventions as ordered.  - [place] patient to unit/surroundings  - Explain treatment plan  - Encourage participation in care  - Encourage verbalization of concerns/fears  - Identify coping mechanisms  - Assist in developing anxiety-reducing skills  - Administer/offer alternative therapies  - Limit or eliminate stimulants  Outcome: Progressing     Problem: Nutrition/Hydration-ADULT  Goal: Nutrient/Hydration intake appropriate for improving, restoring or maintaining nutritional needs  Description: Monitor and assess patient's nutrition/hydration status for malnutrition. Collaborate with interdisciplinary team and initiate plan and interventions as ordered.  Monitor patient's weight and dietary intake as ordered or per policy. Utilize nutrition screening tool and intervene as necessary. Determine patient's food preferences and provide high-protein, high-caloric foods as appropriate.     INTERVENTIONS:  - Monitor oral intake, urinary output, labs, and treatment plans  - Assess nutrition and hydration status and recommend course of action  - Evaluate amount of meals eaten  - Assist patient with  eating if necessary   - Allow adequate time for meals  - Recommend/ encourage appropriate diets, oral nutritional supplements, and vitamin/mineral supplements  - Order, calculate, and assess calorie counts as needed  - Recommend, monitor, and adjust tube feedings and TPN/PPN based on assessed needs  - Assess need for intravenous fluids  - Provide specific nutrition/hydration education as appropriate  - Include patient/family/caregiver in decisions related to nutrition  Outcome: Progressing

## 2024-02-02 NOTE — NURSING NOTE
Patient was assessed in room during medication administration, patient reports no AVH/SI/HI. Patient has had no behavioral issues to note. Patient has been med compliant. Patient states they have no further needs at this time. Patient encouraged to use PRN's if he feels anxious or agitated. Patient reports they were effective last night for him and helped.

## 2024-02-02 NOTE — PLAN OF CARE
Problem: Ineffective Coping  Goal: Identifies ineffective coping skills  2/2/2024 0033 by Bharat Sanchez RN  Outcome: Progressing  2/2/2024 0026 by Bharat Sanchez RN  Outcome: Progressing  Goal: Identifies healthy coping skills  2/2/2024 0033 by Bharat Sanchez RN  Outcome: Progressing  2/2/2024 0026 by Bharat Sanchez RN  Outcome: Progressing  Goal: Demonstrates healthy coping skills  2/2/2024 0033 by Bharat Sanchez RN  Outcome: Progressing  2/2/2024 0026 by Bharat Sanchez RN  Outcome: Progressing  Goal: Participates in unit activities  Description: Interventions:  - Provide therapeutic environment   - Provide required programming   - Redirect inappropriate behaviors   2/2/2024 0033 by Bharat Sanchez RN  Outcome: Progressing  2/2/2024 0026 by Bharat Sanchez RN  Outcome: Progressing  Goal: Patient/Family participate in treatment and DC plans  Description: Interventions:  - Provide therapeutic environment  2/2/2024 0033 by Bharat Sanchez RN  Outcome: Progressing  2/2/2024 0026 by Bharat Sanchez RN  Outcome: Progressing  Goal: Patient/Family verbalizes awareness of resources  2/2/2024 0033 by Bharat Sanchez RN  Outcome: Progressing  2/2/2024 0026 by Bharat Sanchez RN  Outcome: Progressing  Goal: Understands least restrictive measures  Description: Interventions:  - Utilize least restrictive behavior  2/2/2024 0033 by Bharat Sanchez RN  Outcome: Progressing  2/2/2024 0026 by Bharat Sanchez RN  Outcome: Progressing  Goal: Free from restraint events  Description: - Utilize least restrictive measures   - Provide behavioral interventions   - Redirect inappropriate behaviors   2/2/2024 0033 by Bharat Sanchez RN  Outcome: Progressing  2/2/2024 0026 by Bharat Sanchez RN  Outcome: Progressing     Problem: Depression  Goal: Treatment Goal: Demonstrate behavioral control of depressive symptoms, verbalize feelings of improved mood/affect, and adopt new coping skills prior to  discharge  2/2/2024 0033 by Bharat Sanchez RN  Outcome: Progressing  2/2/2024 0026 by Bharat Sanchez RN  Outcome: Progressing  Goal: Verbalize thoughts and feelings  Description: Interventions:  - Assess and re-assess patient's level of risk   - Engage patient in 1:1 interactions, daily, for a minimum of 15 minutes   - Encourage patient to express feelings, fears, frustrations, hopes   2/2/2024 0033 by Bharat Sanchez RN  Outcome: Progressing  2/2/2024 0026 by Bharat Sanchez RN  Outcome: Progressing  Goal: Refrain from harming self  Description: Interventions:  - Monitor patient closely, per order   - Supervise medication ingestion, monitor effects and side effects   2/2/2024 0033 by Bharat Sanchez RN  Outcome: Progressing  2/2/2024 0026 by Bharat Sanchez RN  Outcome: Progressing  Goal: Refrain from isolation  Description: Interventions:  - Develop a trusting relationship   - Encourage socialization   2/2/2024 0033 by Bharat Sanchez RN  Outcome: Progressing  2/2/2024 0026 by Bharat Sanchez RN  Outcome: Progressing  Goal: Refrain from self-neglect  2/2/2024 0033 by Bharat Sanchez RN  Outcome: Progressing  2/2/2024 0026 by Bharat Sanchez RN  Outcome: Progressing  Goal: Attend and participate in unit activities, including therapeutic, recreational, and educational groups  Description: Interventions:  - Provide therapeutic and educational activities daily, encourage attendance and participation, and document same in the medical record   2/2/2024 0033 by Bharat Sanchez RN  Outcome: Progressing  2/2/2024 0026 by Bharat Sanchez RN  Outcome: Progressing  Goal: Complete daily ADLs, including personal hygiene independently, as able  Description: Interventions:  - Observe, teach, and assist patient with ADLS  -  Monitor and promote a balance of rest/activity, with adequate nutrition and elimination   2/2/2024 0033 by Bharat Sanchez RN  Outcome: Progressing  2/2/2024 0026 by Bharat  Daniel RN  Outcome: Progressing     Problem: Anxiety  Goal: Anxiety is at manageable level  Description: Interventions:  - Assess and monitor patient's anxiety level.   - Monitor for signs and symptoms (heart palpitations, chest pain, shortness of breath, headaches, nausea, feeling jumpy, restlessness, irritable, apprehensive).   - Collaborate with interdisciplinary team and initiate plan and interventions as ordered.  - [place] patient to unit/surroundings  - Explain treatment plan  - Encourage participation in care  - Encourage verbalization of concerns/fears  - Identify coping mechanisms  - Assist in developing anxiety-reducing skills  - Administer/offer alternative therapies  - Limit or eliminate stimulants  2/2/2024 0033 by Bharat Sanchez, RN  Outcome: Progressing  2/2/2024 0026 by Bharat Sanchez RN  Outcome: Progressing     Problem: Nutrition/Hydration-ADULT  Goal: Nutrient/Hydration intake appropriate for improving, restoring or maintaining nutritional needs  Description: Monitor and assess patient's nutrition/hydration status for malnutrition. Collaborate with interdisciplinary team and initiate plan and interventions as ordered.  Monitor patient's weight and dietary intake as ordered or per policy. Utilize nutrition screening tool and intervene as necessary. Determine patient's food preferences and provide high-protein, high-caloric foods as appropriate.     INTERVENTIONS:  - Monitor oral intake, urinary output, labs, and treatment plans  - Assess nutrition and hydration status and recommend course of action  - Evaluate amount of meals eaten  - Assist patient with eating if necessary   - Allow adequate time for meals  - Recommend/ encourage appropriate diets, oral nutritional supplements, and vitamin/mineral supplements  - Order, calculate, and assess calorie counts as needed  - Recommend, monitor, and adjust tube feedings and TPN/PPN based on assessed needs  - Assess need for intravenous fluids  -  Provide specific nutrition/hydration education as appropriate  - Include patient/family/caregiver in decisions related to nutrition  2/2/2024 0033 by Bharat Sanchez, RN  Outcome: Progressing  2/2/2024 0026 by Bharat Sanchez, RN  Outcome: Progressing

## 2024-02-02 NOTE — PROGRESS NOTES
02/02/24 0855   Team Meeting   Meeting Type Daily Rounds   Team Members Present   Team Members Present Physician;Nurse;   Physician Team Member Angel   Nursing Team Member Pawan   Care Management Team Member Kimberley   Patient/Family Present   Patient Present No   Patient's Family Present No     Pt crying in group yesterday, given PRN medications. Med/meal compliant. Visible on the unit. Discharge to be determined.

## 2024-02-02 NOTE — PROGRESS NOTES
"Progress Note - Behavioral Health   Shaquille Ackerman 28 y.o. male MRN: [id number]  Unit/Bed#: U 346-02 Encounter: [id number]    Assessment/Plan   Principal Problem:    Major depressive disorder, recurrent episode (HCC)  Active Problems:    Medical clearance for psychiatric admission    Marijuana abuse    Serum total bilirubin elevated      Recommended Treatment:      No psychopharmacologic changes necessary at this time; will continue to assess for further optimization.  Continue with current medications:  Lexapro 5mg for an anxiety and depression symptoms  Continue with group therapy, milieu therapy and occupational therapy.    Continue frequent safety checks and vitals per unit protocol.  Continue with [facility] medical management as indicated  Continue coordinating with case management regarding disposition    Legal Status: 201  Disposition: To be determined, coordinating with case management    Case discussed with treatment team.  Risks, benefits and possible side effects of Medications: Risks, benefits, and possible side effects of medications have been explained to the patient, who verbalizes understanding    ------------------------------------------------------------    Subjective: Patient's chart was reviewed, and patient's progress and plan was discussed with treatment team. Per nursing report, Shaquille has been cooperative on the unit and adequately compliant with medications. Patient had an outburst yesterday during group session. Patient states he had this outburst because he was upset that his mom and family friend will not allow him to live with him.    Shaquille was evaluated this morning for continuity of care. On examination, Shaquille is calm and cooperative. He states his mood is \"neutral.\" He reports sleeping 7-8 hours last night with some waking up in the middle of the night and his energy level today is good. His appetite has been poor and states he tried to eat breakfast but ended up not eating. He denies " "adverse effects from medications. He currently denies suicidal ideation, homicidal ideation, auditory hallucinations, and visual hallucinations. His goals for today are to remain in behavioral control and to work with case management on outpatient resources.    VS: Reviewed, within normal limits    Progress Toward Goals: depression improvement    Psychiatric Review of Systems:  Behavior over the last 24 hours: unchanged  Sleep: normal  Appetite:  poor  Medication side effects: none verbalized  Medical ROS:  Denies N/V/D , chest pain, fever. ROS is negative otherwise    Vital signs in last 24 hours:  Temp:  [97 °F (36.1 °C)-99.7 °F (37.6 °C)] 97.3 °F (36.3 °C)  HR:  [81-85] 85  Resp:  [16-18] 18  BP: (123-135)/(58-73) 123/58    Mental Status Exam:    Appearance:  alert, good eye contact, appears stated age, casually dressed, appropriate grooming and hygiene, and thin   Behavior:  calm and cooperative   Speech:  spontaneous and coherent   Mood:  \"Neutral\"   Affect:  normal range and intensity   Thought Process:  Organized, logical, goal-directed   Associations: tangential associations   Thought Content:  no verbalized delusions or overt paranoia   Perceptual Disturbances: no reported hallucinations and does not appear to be responding to internal stimuli at this time   Risk Potential: Suicidal ideation - None at present  Homicidal ideation - None  Potential for aggression - No   Sensorium:  oriented to person, place, and time/date   Memory:  recent and remote memory grossly intact   Consciousness:  alert and awake   Attention/Concentration: attention span and concentration are age appropriate   Insight:  fair   Judgment: limited   Gait/Station: normal gait/station   Motor Activity: no abnormal movements     Current Medications:  Current Facility-Administered Medications   Medication Dose Route Frequency Provider Last Rate    acetaminophen  650 mg Oral Q6H PRN Zakia Resendez DO      acetaminophen  650 mg Oral Q4H PRN " Zakia Resendez, DO      acetaminophen  975 mg Oral Q6H PRN Zakia Resendez, DO      aluminum-magnesium hydroxide-simethicone  30 mL Oral Q4H PRN Zakia Resendez, DO      haloperidol lactate  2.5 mg Intramuscular Q4H PRN Max 4/day Zakia Resendez, DO      And    LORazepam  1 mg Intramuscular Q4H PRN Max 4/day Zakia Resendez, DO      And    benztropine  0.5 mg Intramuscular Q4H PRN Max 4/day Zakia Resendez, DO      haloperidol lactate  5 mg Intramuscular Q4H PRN Max 4/day Zakia Resendez, DO      And    LORazepam  2 mg Intramuscular Q4H PRN Max 4/day Zakia Resendez, DO      And    benztropine  1 mg Intramuscular Q4H PRN Max 4/day Zakia Resendez, DO      benztropine  1 mg Intramuscular Q4H PRN Max 6/day Zakia Resendez, DO      benztropine  1 mg Oral Q4H PRN Max 6/day Zakia Resendez, DO      hydrOXYzine HCL  50 mg Oral Q6H PRN Max 4/day Zakia Resendez, DO      Or    diphenhydrAMINE  50 mg Intramuscular Q6H PRN Zakia Resendez, DO      escitalopram  5 mg Oral Daily Gregory Bennett MD      Artificial Tears  1 drop Both Eyes Q3H PRN Zakia Resendez, DO      haloperidol  1 mg Oral Q6H PRN Zakia Resendez, DO      haloperidol  2.5 mg Oral Q4H PRN Max 4/day Zakia Resendez, DO      haloperidol  5 mg Oral Q4H PRN Max 4/day Zakia Resendez, DO      hydrOXYzine HCL  100 mg Oral Q6H PRN Max 4/day Zakia Resendez, DO      Or    LORazepam  2 mg Intramuscular Q6H PRN Zakia Resendez, DO      hydrOXYzine HCL  25 mg Oral Q6H PRN Max 4/day Zakia Resendez, DO      polyethylene glycol  17 g Oral Daily PRN Zakia Resendez, DO      propranolol  10 mg Oral Q8H PRN Zakia Resendez, DO      senna-docusate sodium  1 tablet Oral Daily PRN Zakia Resendez, DO      traZODone  50 mg Oral HS PRN Zakia Resendez, DO         Behavioral Health Medications: all current active meds have been reviewed. Changes as in plan section above.    Laboratory results:  I have personally reviewed all pertinent laboratory/tests results.   Recent Results (from the past 48  hour(s))   Comprehensive metabolic panel    Collection Time: 02/01/24  6:48 AM   Result Value Ref Range    Sodium 139 135 - 147 mmol/L    Potassium 3.9 3.5 - 5.3 mmol/L    Chloride 105 96 - 108 mmol/L    CO2 25 21 - 32 mmol/L    ANION GAP 9 mmol/L    BUN 16 5 - 25 mg/dL    Creatinine 1.12 0.60 - 1.30 mg/dL    Glucose 77 65 - 140 mg/dL    Glucose, Fasting 77 65 - 99 mg/dL    Calcium 9.2 8.4 - 10.2 mg/dL    AST 26 13 - 39 U/L    ALT 12 7 - 52 U/L    Alkaline Phosphatase 43 34 - 104 U/L    Total Protein 6.6 6.4 - 8.4 g/dL    Albumin 4.5 3.5 - 5.0 g/dL    Total Bilirubin 1.47 (H) 0.20 - 1.00 mg/dL    eGFR 87 ml/min/1.73sq m   CBC and differential    Collection Time: 02/01/24  6:48 AM   Result Value Ref Range    WBC 4.42 4.31 - 10.16 Thousand/uL    RBC 4.88 3.88 - 5.62 Million/uL    Hemoglobin 15.3 12.0 - 17.0 g/dL    Hematocrit 44.4 36.5 - 49.3 %    MCV 91 82 - 98 fL    MCH 31.4 26.8 - 34.3 pg    MCHC 34.5 31.4 - 37.4 g/dL    RDW 11.9 11.6 - 15.1 %    MPV 9.7 8.9 - 12.7 fL    Platelets 177 149 - 390 Thousands/uL    nRBC 0 /100 WBCs    Neutrophils Relative 65 43 - 75 %    Immat GRANS % 0 0 - 2 %    Lymphocytes Relative 26 14 - 44 %    Monocytes Relative 8 4 - 12 %    Eosinophils Relative 0 0 - 6 %    Basophils Relative 1 0 - 1 %    Neutrophils Absolute 2.87 1.85 - 7.62 Thousands/µL    Immature Grans Absolute 0.01 0.00 - 0.20 Thousand/uL    Lymphocytes Absolute 1.14 0.60 - 4.47 Thousands/µL    Monocytes Absolute 0.37 0.17 - 1.22 Thousand/µL    Eosinophils Absolute 0.01 0.00 - 0.61 Thousand/µL    Basophils Absolute 0.02 0.00 - 0.10 Thousands/µL   Vitamin B12    Collection Time: 02/01/24  6:48 AM   Result Value Ref Range    Vitamin B-12 720 180 - 914 pg/mL   TSH, 3rd generation with Free T4 reflex    Collection Time: 02/01/24  6:48 AM   Result Value Ref Range    TSH 3RD GENERATON 0.968 0.450 - 4.500 uIU/mL   ECG 12 lead    Collection Time: 02/01/24 10:54 AM   Result Value Ref Range    Ventricular Rate 64 BPM    Atrial Rate  64 BPM    NV Interval 148 ms    QRSD Interval 94 ms    QT Interval 402 ms    QTC Interval 414 ms    P [place] 57 degrees    QRS Axis 19 degrees    T Wave Axis 19 degrees        Cl Barger MS3

## 2024-02-02 NOTE — PLAN OF CARE
Problem: Ineffective Coping  Goal: Participates in unit activities  Description: Interventions:  - Provide therapeutic environment   - Provide required programming   - Redirect inappropriate behaviors   Outcome: Progressing  Patient has been attending groups

## 2024-02-02 NOTE — NURSING NOTE
Patient is calm and reports PRN was effective. No loud or angry outbursts observed. Q 7 minute safety checks maintained.

## 2024-02-02 NOTE — NURSING NOTE
Patient is withdrawn to self, limited interaction with peers and staff. Patient denies SI/HI/AVH. Patient denies anxiety and depression. Patient remains in behavioral control. Patient received PRN Trazodone 50mg PO at 2107 for insomnia. No further complaints or unmet needs identified at this time. Q 7 minute safety checks maintained.

## 2024-02-03 LAB
ALBUMIN SERPL BCP-MCNC: 4.6 G/DL (ref 3.5–5)
ALP SERPL-CCNC: 42 U/L (ref 34–104)
ALT SERPL W P-5'-P-CCNC: 10 U/L (ref 7–52)
ANION GAP SERPL CALCULATED.3IONS-SCNC: 7 MMOL/L
AST SERPL W P-5'-P-CCNC: 17 U/L (ref 13–39)
BILIRUB SERPL-MCNC: 1.31 MG/DL (ref 0.2–1)
BUN SERPL-MCNC: 15 MG/DL (ref 5–25)
CALCIUM SERPL-MCNC: 9.2 MG/DL (ref 8.4–10.2)
CHLORIDE SERPL-SCNC: 107 MMOL/L (ref 96–108)
CO2 SERPL-SCNC: 27 MMOL/L (ref 21–32)
CREAT SERPL-MCNC: 1.17 MG/DL (ref 0.6–1.3)
GFR SERPL CREATININE-BSD FRML MDRD: 83 ML/MIN/1.73SQ M
GLUCOSE P FAST SERPL-MCNC: 83 MG/DL (ref 65–99)
GLUCOSE SERPL-MCNC: 83 MG/DL (ref 65–140)
POTASSIUM SERPL-SCNC: 4 MMOL/L (ref 3.5–5.3)
PROT SERPL-MCNC: 6.4 G/DL (ref 6.4–8.4)
SODIUM SERPL-SCNC: 141 MMOL/L (ref 135–147)

## 2024-02-03 PROCEDURE — 99232 SBSQ HOSP IP/OBS MODERATE 35: CPT | Performed by: STUDENT IN AN ORGANIZED HEALTH CARE EDUCATION/TRAINING PROGRAM

## 2024-02-03 PROCEDURE — 80053 COMPREHEN METABOLIC PANEL: CPT | Performed by: NURSE PRACTITIONER

## 2024-02-03 RX ORDER — TRAZODONE HYDROCHLORIDE 100 MG/1
100 TABLET ORAL
Status: DISCONTINUED | OUTPATIENT
Start: 2024-02-03 | End: 2024-02-08

## 2024-02-03 RX ORDER — ESCITALOPRAM OXALATE 10 MG/1
10 TABLET ORAL DAILY
Status: DISCONTINUED | OUTPATIENT
Start: 2024-02-04 | End: 2024-02-22

## 2024-02-03 RX ADMIN — ESCITALOPRAM OXALATE 5 MG: 5 TABLET, FILM COATED ORAL at 09:40

## 2024-02-03 RX ADMIN — ALUMINUM HYDROXIDE, MAGNESIUM HYDROXIDE, AND DIMETHICONE 30 ML: 200; 20; 200 SUSPENSION ORAL at 13:10

## 2024-02-03 RX ADMIN — TRAZODONE HYDROCHLORIDE 100 MG: 100 TABLET ORAL at 21:33

## 2024-02-03 RX ADMIN — HYDROXYZINE HYDROCHLORIDE 50 MG: 50 TABLET, FILM COATED ORAL at 10:27

## 2024-02-03 NOTE — NURSING NOTE
Patient reporting indigestion following eating, Given maalox PRN.    14:11: Patient reports maalox effective for indisgestion. Patient reports no further feelings of indigestion.

## 2024-02-03 NOTE — NURSING NOTE
"Patient reports no AVH/SI/HI but reports feeling like \"I fucked up for 10 years of my life after high school. I've been focused on my problems, messed up relationships, because I am a piece of shit.\" Patient given advice regarding how to mend relationships and how to forgive oneself. Therapeutic communication given and listening. Patient reports having a held a job in the past but is worried about being homeless in the future as he has no place to go to currently. Patient became more visible and brighter after talking, was initially scant and isolative to room.   "

## 2024-02-03 NOTE — PROGRESS NOTES
Progress Note - Behavioral Health   Shaquille Ackerman 28 y.o. male MRN: [id number]  Unit/Bed#: [facility] 346-02 Encounter: [id number]    Assessment/Plan   Principal Problem:    Major depressive disorder, recurrent episode (HCC)  Active Problems:    Medical clearance for psychiatric admission    Marijuana abuse    Serum total bilirubin elevated    Recommended Treatment:   Increase Lexapro to 10 mg daily.  Increase trazodone to 100 mg nightly as needed.  Continue all other medications at current doses as below.  Encourage group therapy, milieu therapy and occupational therapy  All current active medications have been reviewed  Encourage group therapy, milieu therapy and occupational therapy  Behavioral Health checks every 7 minutes    ----------------------------------------      Subjective:   Per nursing report, patient has been doing well.  He has been calm and cooperative on the unit.  He has been without any acute behavioral issues. Patient reports that his mood is overall fair.  He is still appearing fairly anxious and notes that his sleep remains poor.  He does note some benefit from trazodone but limited.  He denies any current SI, HI, or AVH.  He has not noticed any significant adverse effects on Lexapro.  Patient is amenable to further titration of Lexapro and titration of as needed dose of trazodone.    Behavior over the last 24 hours:  unchanged  Sleep: poor  Appetite: normal  Medication side effects: No  ROS: no complaints and all other systems are negative    Mental Status Evaluation:  Appearance:  dressed appropriately and disheveled   Behavior:  calm, cooperative, and guarded   Speech:  normal rate and volume   Mood:  anxious   Affect:  constricted   Thought Process:  linear and goal directed   Associations: concrete associations   Thought Content:  no overt delusions   Perceptual Disturbances: denies auditory or visual hallucinations when asked, does not appear responding to internal stimuli   Risk Potential:  Suicidal ideation - None  Homicidal ideation - None  Potential for aggression - Not at present   Sensorium:  oriented to person, place, and time/date   Memory:  recent and remote memory grossly intact   Consciousness:  alert and awake   Attention/Concentration: attention span and concentration are age appropriate   Insight:  limited   Judgment: limited   Gait/Station: normal gait/station, normal balance   Motor Activity: no abnormal movements     Medications: all current active meds have been reviewed.  Current Facility-Administered Medications   Medication Dose Route Frequency Provider Last Rate    acetaminophen  650 mg Oral Q6H PRN Zakia Mal, DO      acetaminophen  650 mg Oral Q4H PRN Zakia Mal, DO      acetaminophen  975 mg Oral Q6H PRN Zakia Mal, DO      aluminum-magnesium hydroxide-simethicone  30 mL Oral Q4H PRN Zakia Mal, DO      haloperidol lactate  2.5 mg Intramuscular Q4H PRN Max 4/day Zakia Mal, DO      And    LORazepam  1 mg Intramuscular Q4H PRN Max 4/day Zakia Mal, DO      And    benztropine  0.5 mg Intramuscular Q4H PRN Max 4/day Zakia Mal, DO      haloperidol lactate  5 mg Intramuscular Q4H PRN Max 4/day Zakia Mal, DO      And    LORazepam  2 mg Intramuscular Q4H PRN Max 4/day Zakia Mal, DO      And    benztropine  1 mg Intramuscular Q4H PRN Max 4/day Zakia Mal, DO      benztropine  1 mg Intramuscular Q4H PRN Max 6/day Zakia Mal, DO      benztropine  1 mg Oral Q4H PRN Max 6/day Zakia Mal, DO      hydrOXYzine HCL  50 mg Oral Q6H PRN Max 4/day Zakia Mal, DO      Or    diphenhydrAMINE  50 mg Intramuscular Q6H PRN Zakia Mal, DO      escitalopram  5 mg Oral Daily Gregory Bennett MD      Artificial Tears  1 drop Both Eyes Q3H PRN Zakia Mal, DO      haloperidol  1 mg Oral Q6H PRN Zakia Mal, DO      haloperidol  2.5 mg Oral Q4H PRN Max 4/day Zakia Mal, DO      haloperidol  5 mg Oral Q4H PRN Max 4/day Zakia Mal, DO       hydrOXYzine HCL  100 mg Oral Q6H PRN Max 4/day Zakia Resendez, DO      Or    LORazepam  2 mg Intramuscular Q6H PRN Zakia Resendez, DO      hydrOXYzine HCL  25 mg Oral Q6H PRN Max 4/day Zakia Resendez, DO      polyethylene glycol  17 g Oral Daily PRN Zakia Resendez, DO      propranolol  10 mg Oral Q8H PRN Zakia Resendez, DO      senna-docusate sodium  1 tablet Oral Daily PRN Zakia Resendez, DO      traZODone  50 mg Oral HS PRN Zakia Resendez, DO         Labs: I have personally reviewed all pertinent laboratory/tests results  Most Recent Labs:   Lab Results   Component Value Date    WBC 4.42 02/01/2024    RBC 4.88 02/01/2024    HGB 15.3 02/01/2024    HCT 44.4 02/01/2024     02/01/2024    RDW 11.9 02/01/2024    NEUTROABS 2.87 02/01/2024    SODIUM 141 02/03/2024    K 4.0 02/03/2024     02/03/2024    CO2 27 02/03/2024    BUN 15 02/03/2024    CREATININE 1.17 02/03/2024    GLUC 83 02/03/2024    CALCIUM 9.2 02/03/2024    AST 17 02/03/2024    ALT 10 02/03/2024    ALKPHOS 42 02/03/2024    TP 6.4 02/03/2024    ALB 4.6 02/03/2024    TBILI 1.31 (H) 02/03/2024    [id number] 0.968 02/01/2024       Progress Toward Goals: progressing    Risks / Benefits of Treatment:    Risks, benefits, and possible side effects of medications explained to patient and patient verbalizes understanding and agreement for treatment.    Counseling / Coordination of Care:    Patient's progress discussed with staff in treatment team meeting.  Medications, treatment progress and treatment plan reviewed with patient.    Gregory Bennett MD 02/03/24

## 2024-02-03 NOTE — NURSING NOTE
10:27: Patient given 50mg of atarax for moderate anxiety relating to his feelings of what he will do once he leaves here.     11:27: 50mg of atarax effective for moderate anxiety. Patient reports no further anxiety at this time.

## 2024-02-03 NOTE — NURSING NOTE
"PT observed I bed under his covers, cooperative and calm on approach. PT denies SI/HI/VH/AH, stated \"anxiety and depression at a normal level nothing crazy\", encouraged to approach staff if in need. No scheduled meds at this time.    2207 PT approach nursing station  requesting for PRN Trazodone 50mg for insomnia, given at 2207.       2307 PRN Trazodone 50mg was effective, PT sleeping at this time.   "

## 2024-02-04 PROCEDURE — 99232 SBSQ HOSP IP/OBS MODERATE 35: CPT | Performed by: STUDENT IN AN ORGANIZED HEALTH CARE EDUCATION/TRAINING PROGRAM

## 2024-02-04 RX ADMIN — TRAZODONE HYDROCHLORIDE 100 MG: 100 TABLET ORAL at 21:21

## 2024-02-04 RX ADMIN — HYDROXYZINE HYDROCHLORIDE 50 MG: 50 TABLET, FILM COATED ORAL at 14:57

## 2024-02-04 RX ADMIN — ALUMINUM HYDROXIDE, MAGNESIUM HYDROXIDE, AND DIMETHICONE 30 ML: 200; 20; 200 SUSPENSION ORAL at 18:33

## 2024-02-04 RX ADMIN — ESCITALOPRAM OXALATE 10 MG: 10 TABLET ORAL at 09:37

## 2024-02-04 NOTE — NURSING NOTE
50mg of atarax given for moderate anxiety. Patient reporting feeling anxious regarding to what he will do after leaving here.

## 2024-02-04 NOTE — PROGRESS NOTES
"Progress Note - Behavioral Health   Shaquille Ackerman 28 y.o. male MRN: [id number]  Unit/Bed#: [facility] 346-02 Encounter: [id number]    Assessment/Plan   Principal Problem:    Major depressive disorder, recurrent episode (HCC)  Active Problems:    Medical clearance for psychiatric admission    Marijuana abuse    Serum total bilirubin elevated    Recommended Treatment:   Continue medications at current doses as below.  Encourage group therapy, milieu therapy and occupational therapy  All current active medications have been reviewed  Encourage group therapy, milieu therapy and occupational therapy  Behavioral Health checks every 7 minutes    ----------------------------------------      Subjective:   Per nursing report, patient is doing fairly well.  He has been somewhat depressed but more visible on the unit.  He remains medication compliant.  He has been without any acute behavioral issues. Patient reports that his mood is good.  He denies any SI, HI, or AVH.  He endorses tolerating his medications fairly well, though has been having some diarrhea.  He remains agreeable to continuing medications at current doses.  He denies questions or concerns at this time.    Behavior over the last 24 hours:  unchanged  Sleep: normal  Appetite: normal  Medication side effects: Yes diarrhea  ROS: all other systems are negative    Mental Status Evaluation:  Appearance:  disheveled and marginal hygiene   Behavior:  calm, pleasant, and cooperative   Speech:  normal rate and volume   Mood:  \"Okay\"   Affect:  constricted, slightly brighter   Thought Process:  linear and goal directed   Associations: concrete associations   Thought Content:  no overt delusions   Perceptual Disturbances: denies auditory or visual hallucinations when asked   Risk Potential: Suicidal ideation - None  Homicidal ideation - None  Potential for aggression - Not at present   Sensorium:  oriented to person, place, and time/date   Memory:  recent and remote memory grossly " intact   Consciousness:  alert and awake   Attention/Concentration: attention span and concentration are age appropriate   Insight:  limited   Judgment: limited   Gait/Station: normal gait/station, normal balance   Motor Activity: no abnormal movements     Medications: all current active meds have been reviewed.  Current Facility-Administered Medications   Medication Dose Route Frequency Provider Last Rate    acetaminophen  650 mg Oral Q6H PRN Zakia Resendez, DO      acetaminophen  650 mg Oral Q4H PRN Zakia Resendez, DO      acetaminophen  975 mg Oral Q6H PRN Zakia Resendez, DO      aluminum-magnesium hydroxide-simethicone  30 mL Oral Q4H PRN Zakia Resendez, DO      haloperidol lactate  2.5 mg Intramuscular Q4H PRN Max 4/day Zakia Resendez, DO      And    LORazepam  1 mg Intramuscular Q4H PRN Max 4/day Zakia Resendez, DO      And    benztropine  0.5 mg Intramuscular Q4H PRN Max 4/day Zakia Resendez, DO      haloperidol lactate  5 mg Intramuscular Q4H PRN Max 4/day Zakia Resendez, DO      And    LORazepam  2 mg Intramuscular Q4H PRN Max 4/day Zakia Resendez, DO      And    benztropine  1 mg Intramuscular Q4H PRN Max 4/day Zakia Resendez, DO      benztropine  1 mg Intramuscular Q4H PRN Max 6/day Zakia Resendez, DO      benztropine  1 mg Oral Q4H PRN Max 6/day Zakia Resendez, DO      hydrOXYzine HCL  50 mg Oral Q6H PRN Max 4/day Zakia Resendez, DO      Or    diphenhydrAMINE  50 mg Intramuscular Q6H PRN Zakia Resendez, DO      escitalopram  10 mg Oral Daily Gregory Bennett MD      Artificial Tears  1 drop Both Eyes Q3H PRN Zakia Resendez, DO      haloperidol  1 mg Oral Q6H PRN Zakia Resendez, DO      haloperidol  2.5 mg Oral Q4H PRN Max 4/day Zakia Resendez, DO      haloperidol  5 mg Oral Q4H PRN Max 4/day Zakia Resendez, DO      hydrOXYzine HCL  100 mg Oral Q6H PRN Max 4/day Zakia Resendez, DO      Or    LORazepam  2 mg Intramuscular Q6H PRN Zakia Resendez, DO      hydrOXYzine HCL  25 mg Oral Q6H PRN Max 4/day  Zakia Resendez, DO      polyethylene glycol  17 g Oral Daily PRN Zakia Resednez, DO      propranolol  10 mg Oral Q8H PRN Zakia Resendez, DO      senna-docusate sodium  1 tablet Oral Daily PRN Zakia Resendez, DO      traZODone  100 mg Oral HS PRN Gregory Bennett MD         Labs: I have personally reviewed all pertinent laboratory/tests results  Most Recent Labs:   Lab Results   Component Value Date    WBC 4.42 02/01/2024    RBC 4.88 02/01/2024    HGB 15.3 02/01/2024    HCT 44.4 02/01/2024     02/01/2024    RDW 11.9 02/01/2024    NEUTROABS 2.87 02/01/2024    SODIUM 141 02/03/2024    K 4.0 02/03/2024     02/03/2024    CO2 27 02/03/2024    BUN 15 02/03/2024    CREATININE 1.17 02/03/2024    GLUC 83 02/03/2024    CALCIUM 9.2 02/03/2024    AST 17 02/03/2024    ALT 10 02/03/2024    ALKPHOS 42 02/03/2024    TP 6.4 02/03/2024    ALB 4.6 02/03/2024    TBILI 1.31 (H) 02/03/2024    [id number] 0.968 02/01/2024       Progress Toward Goals: progressing    Risks / Benefits of Treatment:    Risks, benefits, and possible side effects of medications explained to patient and patient verbalizes understanding and agreement for treatment.    Counseling / Coordination of Care:    Patient's progress discussed with staff in treatment team meeting.  Medications, treatment progress and treatment plan reviewed with patient.    Gregory Bennett MD 02/04/24

## 2024-02-04 NOTE — DISCHARGE INSTR - OTHER ORDERS
You are discharging to [facility]: [address], [place] [address]     CRISIS INFORMATION  If you are experiencing a mental health emergency, you may call the [facility] Crisis Intervention Office 24 hours a day, 7 days per week at [phone].    In [facility], call [phone].    Warmline is a confidential 24/7 telephone support service manned by trained mental health consumers.  [employer]arm[employer] provides support, a listening ear and can provide information about available services.[employer] specializes in the concerns of mental health consumers, their families and friends.  However, we are also here for anyone who has a mental health concern, is confused about or just doesn't know anything about mental health or where to get information.  To reach [facility], call [phone].    HOW TO GET SUBSTANCE ABUSE HELP:  If you or someone you know has a drug or alcohol problem, there is help:  [facility] Drug & Alcohol Abuse Services: [phone]  [facility] Drug & Alcohol Abuse Services: [phone]  An assessment is the first step.   In addition to those listed there are other programs available in the area but assessment is best to determine an appropriate level of care.  If you DO NOT have Medical Assistance (MA) or Private Insurance, an assessment can be scheduled at one of these providers:  [facility]  [address] [place] [address]  [phone]   [facility]  [address] [place] [address]  [phone]   [facility]  [address] [place] [address]. [place] [address]  [phone]   [facility]  [address] [place] [address]  [phone]   [facility]  [address] [place] [address]  [phone]   [facility]  [address] [place] [address]  [phone]   [facility]  [address], [place] [address]  [phone]     If you HAVE  Medical Assistance, an assessment can be scheduled at one of these providers:  [facility]  [address] [place] [address]  [phone]   [facility]  [address] [place] [facility] [place] [address]  [phone]   [facility]  [address], [place] [address]  [phone]  [address] [place] [address], [address], [place] [address] [phone]   [address] [place] [address]  [phone]   [facility]  [address] [place] [address]  [phone]   [facility] ([facility])  [address] [place] [address]  [phone]   [facility]  [address] [place] [address]  [phone]   [facility]  [address] [place] [address] [place] [address]  [phone]   [facility]  [address] [place] [address]  [phone]   [facility]  [address], [place] [address]  [phone]     If you HAVE Private Insurance, an assessment can be scheduled at one of these providers.  Please contact these Providers to determine if they are in your network plan:  [facility]  [address], [place] [address]  [phone]   [address] [place] [address]  [phone]   [facility]  [address] [place] [address]  [phone]   [facility] ([facility])  [address] [place] [address]  [phone]   [facility]  [address] [place] [address]  [phone]   [facility]  [address], [place] [address]  [phone]     From the [facility] website [place]guides[place]opioid-epidemic/#GetNaloxone    How do I get naloxone?  Family members and friends can access naloxone by:    Obtaining a prescription from their family doctor  Using the standing order issued  by Acting Physician General Cary Rutledge A standing order is a prescription written for the general public, rather than specifically for an individual.  To use the standing order, print it and take it with you to the pharmacy or have the digital version on your phone. Download the standing order from the Department of [facility] (PDF).    If you are unable to print it or use the digital version, the standing order is kept on file at many pharmacies. If a pharmacy does not have it on file, they may have the ability to look it up.    Naloxone prescriptions can be filled at most pharmacies. Although the medication might not be available for same-day pickup, it often can be ordered and available within a day or two.

## 2024-02-04 NOTE — PROGRESS NOTES
02/02/24 1535   Team Meeting   Meeting Type Tx Team Meeting   Initial Conference Date 02/02/24   Team Members Present   Team Members Present Physician;Nurse;   Physician Team Member Mal   Nursing Team Member [facility] Team Member Kimberley   Patient/Family Present   Patient Present Yes   Patient's Family Present No     Tx plan was reviewed and discussed with Pt. Pt was encouraged to attend groups. Medication was discussed with Pt. Pt signed tx plan.

## 2024-02-04 NOTE — NURSING NOTE
50mg of atarax given for moderate anxiety. Patient reporting feeling anxious regarding to what he will do after leaving here.     1557: Patient observed calmly walking milieu. 50mg of atarax effective for moderate anxiety.

## 2024-02-04 NOTE — DISCHARGE INSTR - APPOINTMENTS
Naina, or Jo, our Behavioral Health Nurse Navigators, will be calling you after your discharge, on the phone number that you provided.  They will be available as an additional support, if needed.   If you wish to speak with one of them, you may contact Naina at [phone] or Jo at [phone].'

## 2024-02-04 NOTE — NURSING NOTE
Pt is cooperative and med compliant. Pt is quiet and scant with conversation. Pt denies depression and anxiety. Pt denies SI/HI and AH/VH. No unmet needs reported. Will continue to monitor.

## 2024-02-04 NOTE — NURSING NOTE
PRN Trazodone administered po for c/o difficulty sleeping @2133.     Pt sleeping comfortable after intervention.

## 2024-02-04 NOTE — NURSING NOTE
Patient was assessed in room during medication administration, patient reports no AVH/SI/HI. Patient has had no behavioral issues to note. Patient has been med compliant. Patient states they have no further needs at this time.

## 2024-02-05 PROCEDURE — 99232 SBSQ HOSP IP/OBS MODERATE 35: CPT | Performed by: STUDENT IN AN ORGANIZED HEALTH CARE EDUCATION/TRAINING PROGRAM

## 2024-02-05 RX ORDER — LANOLIN ALCOHOL/MO/W.PET/CERES
3 CREAM (GRAM) TOPICAL
Status: DISCONTINUED | OUTPATIENT
Start: 2024-02-05 | End: 2024-02-22

## 2024-02-05 RX ADMIN — HYDROXYZINE HYDROCHLORIDE 100 MG: 50 TABLET, FILM COATED ORAL at 15:23

## 2024-02-05 RX ADMIN — ESCITALOPRAM OXALATE 10 MG: 10 TABLET ORAL at 08:14

## 2024-02-05 RX ADMIN — MELATONIN TAB 3 MG 3 MG: 3 TAB at 21:21

## 2024-02-05 RX ADMIN — TRAZODONE HYDROCHLORIDE 100 MG: 100 TABLET ORAL at 22:53

## 2024-02-05 NOTE — NURSING NOTE
Pt denies SI/HI/AH/VH but appears internally preoccupied. Present in dayroom and milieu. Social with select peers. Medication and meal compliant. Scant/guarded with communication. Pt appears paranoid at times. No further concerns as of present. Plan of care ongoing.

## 2024-02-05 NOTE — PLAN OF CARE
Problem: Ineffective Coping  Goal: Identifies ineffective coping skills  Outcome: Progressing  Goal: Identifies healthy coping skills  Outcome: Progressing  Goal: Demonstrates healthy coping skills  Outcome: Progressing  Goal: Participates in unit activities  Description: Interventions:  - Provide therapeutic environment   - Provide required programming   - Redirect inappropriate behaviors   Outcome: Progressing  Goal: Understands least restrictive measures  Description: Interventions:  - Utilize least restrictive behavior  Outcome: Progressing  Goal: Free from restraint events  Description: - Utilize least restrictive measures   - Provide behavioral interventions   - Redirect inappropriate behaviors   Outcome: Progressing     Problem: Depression  Goal: Treatment Goal: Demonstrate behavioral control of depressive symptoms, verbalize feelings of improved mood/affect, and adopt new coping skills prior to discharge  Outcome: Progressing  Goal: Verbalize thoughts and feelings  Description: Interventions:  - Assess and re-assess patient's level of risk   - Engage patient in 1:1 interactions, daily, for a minimum of 15 minutes   - Encourage patient to express feelings, fears, frustrations, hopes   Outcome: Progressing  Goal: Refrain from harming self  Description: Interventions:  - Monitor patient closely, per order   - Supervise medication ingestion, monitor effects and side effects   Outcome: Progressing  Goal: Refrain from isolation  Description: Interventions:  - Develop a trusting relationship   - Encourage socialization   Outcome: Progressing  Goal: Refrain from self-neglect  Outcome: Progressing  Goal: Attend and participate in unit activities, including therapeutic, recreational, and educational groups  Description: Interventions:  - Provide therapeutic and educational activities daily, encourage attendance and participation, and document same in the medical record   Outcome: Progressing  Goal: Complete daily  ADLs, including personal hygiene independently, as able  Description: Interventions:  - Observe, teach, and assist patient with ADLS  -  Monitor and promote a balance of rest/activity, with adequate nutrition and elimination   Outcome: Progressing     Problem: Anxiety  Goal: Anxiety is at manageable level  Description: Interventions:  - Assess and monitor patient's anxiety level.   - Monitor for signs and symptoms (heart palpitations, chest pain, shortness of breath, headaches, nausea, feeling jumpy, restlessness, irritable, apprehensive).   - Collaborate with interdisciplinary team and initiate plan and interventions as ordered.  - [place] patient to unit/surroundings  - Explain treatment plan  - Encourage participation in care  - Encourage verbalization of concerns/fears  - Identify coping mechanisms  - Assist in developing anxiety-reducing skills  - Administer/offer alternative therapies  - Limit or eliminate stimulants  Outcome: Progressing     Problem: Nutrition/Hydration-ADULT  Goal: Nutrient/Hydration intake appropriate for improving, restoring or maintaining nutritional needs  Description: Monitor and assess patient's nutrition/hydration status for malnutrition. Collaborate with interdisciplinary team and initiate plan and interventions as ordered.  Monitor patient's weight and dietary intake as ordered or per policy. Utilize nutrition screening tool and intervene as necessary. Determine patient's food preferences and provide high-protein, high-caloric foods as appropriate.     INTERVENTIONS:  - Monitor oral intake, urinary output, labs, and treatment plans  - Assess nutrition and hydration status and recommend course of action  - Evaluate amount of meals eaten  - Assist patient with eating if necessary   - Allow adequate time for meals  - Recommend/ encourage appropriate diets, oral nutritional supplements, and vitamin/mineral supplements  - Order, calculate, and assess calorie counts as needed  - Recommend,  monitor, and adjust tube feedings and TPN/PPN based on assessed needs  - Assess need for intravenous fluids  - Provide specific nutrition/hydration education as appropriate  - Include patient/family/caregiver in decisions related to nutrition  Outcome: Progressing

## 2024-02-05 NOTE — PROGRESS NOTES
Progress Note - Behavioral Health   Shaquille Ackerman 28 y.o. male MRN: [id number]  Unit[facility]Bed#: [email or web address] 346-02 Encounter: [id number]    Subjective:   Per nursing report, patient has been doing generally well.  He has been calm and cooperative.  He made medication compliant. Patient is doing fairly well today.  He notes that he does have some difficulty sleeping.  He is amenable to starting melatonin to assist with his sleep.  He reports generally tolerating medications well, though has had upset stomach.  He denies any SI, HI, or AVH.    Behavior over the last 24 hours:  unchanged  Sleep: insomnia  Appetite: normal  Medication side effects: Yes gastrointestinal upset  ROS: no complaints and all other systems are negative    Mental Status Evaluation:  Appearance:  disheveled and marginal hygiene   Behavior:  calm, pleasant, cooperative, and guarded   Speech:  normal rate and volume   Mood:  anxious   Affect:  constricted   Thought Process:  linear and goal directed   Associations: concrete associations   Thought Content:  no overt delusions   Perceptual Disturbances: denies auditory or visual hallucinations when asked, does not appear responding to internal stimuli   Risk Potential: Suicidal ideation - None  Homicidal ideation - None  Potential for aggression - Not at present   Sensorium:  oriented to person, place, and time/date   Memory:  recent and remote memory grossly intact   Consciousness:  alert and awake   Attention/Concentration: attention span and concentration are age appropriate   Insight:  limited   Judgment: limited   Gait/Station: normal gait/station, normal balance   Motor Activity: no abnormal movements     Medications: all current active meds have been reviewed.  Current Facility-Administered Medications   Medication Dose Route Frequency Provider Last Rate    acetaminophen  650 mg Oral Q6H PRN Zakia Resendez, DO      acetaminophen  650 mg Oral Q4H PRN Zakia Resendez, DO      acetaminophen  975 mg Oral Q6H PRN  Zakia Resendez, DO      aluminum-magnesium hydroxide-simethicone  30 mL Oral Q4H PRN Zakia Resendez, DO      haloperidol lactate  2.5 mg Intramuscular Q4H PRN Max 4/day Zakia Resendez, DO      And    LORazepam  1 mg Intramuscular Q4H PRN Max 4/day Zakiaflores Resendez, DO      And    benztropine  0.5 mg Intramuscular Q4H PRN Max 4/day Zakia Resendez, DO      haloperidol lactate  5 mg Intramuscular Q4H PRN Max 4/day Zakia Resendez, DO      And    LORazepam  2 mg Intramuscular Q4H PRN Max 4/day Zakiaflores Resendez, DO      And    benztropine  1 mg Intramuscular Q4H PRN Max 4/day Zakia Resendez, DO      benztropine  1 mg Intramuscular Q4H PRN Max 6/day Zakia Resendez, DO      benztropine  1 mg Oral Q4H PRN Max 6/day Zakia Resendez, DO      hydrOXYzine HCL  50 mg Oral Q6H PRN Max 4/day Zakia Resendez, DO      Or    diphenhydrAMINE  50 mg Intramuscular Q6H PRN Zakia Resendez, DO      escitalopram  10 mg Oral Daily Gregory Bennett MD      Artificial Tears  1 drop Both Eyes Q3H PRN Zakia Resendez, DO      haloperidol  1 mg Oral Q6H PRN Zakia Resendez, DO      haloperidol  2.5 mg Oral Q4H PRN Max 4/day Zakia Resendez, DO      haloperidol  5 mg Oral Q4H PRN Max 4/day Zakia Resendez, DO      hydrOXYzine HCL  100 mg Oral Q6H PRN Max 4/day Zakia Resendez, DO      Or    LORazepam  2 mg Intramuscular Q6H PRN Zakia Resendez, DO      hydrOXYzine HCL  25 mg Oral Q6H PRN Max 4/day Zakia Resendez, DO      polyethylene glycol  17 g Oral Daily PRN Zakia Resendez, DO      propranolol  10 mg Oral Q8H PRN Zakia Resendez, DO      senna-docusate sodium  1 tablet Oral Daily PRN Zakia Resendez, DO      traZODone  100 mg Oral HS PRN Gregory Bennett MD         Labs: I have personally reviewed all pertinent laboratory/tests results  Most Recent Labs:   Lab Results   Component Value Date    WBC 4.42 02/01/2024    RBC 4.88 02/01/2024    HGB 15.3 02/01/2024    HCT 44.4 02/01/2024     02/01/2024    RDW 11.9 02/01/2024    NEUTROABS 2.87  02/01/2024    SODIUM 141 02/03/2024    K 4.0 02/03/2024     02/03/2024    CO2 27 02/03/2024    BUN 15 02/03/2024    CREATININE 1.17 02/03/2024    GLUC 83 02/03/2024    CALCIUM 9.2 02/03/2024    AST 17 02/03/2024    ALT 10 02/03/2024    ALKPHOS 42 02/03/2024    TP 6.4 02/03/2024    ALB 4.6 02/03/2024    TBILI 1.31 (H) 02/03/2024    [id number] 0.968 02/01/2024         Assessment/Plan   Principal Problem:    Major depressive disorder, recurrent episode (HCC)  Active Problems:    Medical clearance for psychiatric admission    Marijuana abuse    Serum total bilirubin elevated    Recommended Treatment:   Continue Lexapro 10 mg daily.  Start melatonin 3 mg nightly.  Continue all other medications at current doses as above.  Encourage group therapy, milieu therapy and occupational therapy  All current active medications have been reviewed  Encourage group therapy, milieu therapy and occupational therapy  Behavioral Health checks every 7 minutes    ----------------------------------------    Progress Toward Goals: progressing    Risks / Benefits of Treatment:    Risks, benefits, and possible side effects of medications explained to patient and patient verbalizes understanding and agreement for treatment.    Counseling / Coordination of Care:    Patient's progress discussed with staff in treatment team meeting.  Medications, treatment progress and treatment plan reviewed with patient.    Gregory Bennett MD 02/05/24

## 2024-02-05 NOTE — PROGRESS NOTES
02/05/24 0854   Team Meeting   Meeting Type Daily Rounds   Team Members Present   Team Members Present Physician;Nurse;   Physician Team Member Sabrina   [facility]   Care Management Team Member Kimberley   Patient/Family Present   Patient Present No   Patient's Family Present No     Pt med/meal compliant. Reporting self-loathing over the weekend. Reporting anxiety r/t where he will go following discharge. Received PRN Atarax which was effective. Pt received PRN Trazodone for sleep which was effective. Sunday, denies psych symptoms. No behavioral issues. Med/meal compliant. Later reporting anxiety and received PRN Atarax which was effective. C/O indigestion, received PRN Maalox which was effective. Pt requesting PRN Trazodone for sleep which was effective. Discharge to be determined.

## 2024-02-05 NOTE — NURSING NOTE
Pt is cooperative and med compliant. Pt is quiet and scant with conversation. Pt denies depression and anxiety. Pt denies SI/HI and AH/VH. Pt requested something to help with sleep. PRN Trazodone administered po @2121. Will continue to monitor.     Pt sleeping comfortable in bed.

## 2024-02-06 PROCEDURE — 99232 SBSQ HOSP IP/OBS MODERATE 35: CPT | Performed by: STUDENT IN AN ORGANIZED HEALTH CARE EDUCATION/TRAINING PROGRAM

## 2024-02-06 RX ORDER — HYDROXYZINE HYDROCHLORIDE 25 MG/1
25 TABLET, FILM COATED ORAL 2 TIMES DAILY
Status: DISCONTINUED | OUTPATIENT
Start: 2024-02-06 | End: 2024-02-09

## 2024-02-06 RX ADMIN — HYDROXYZINE HYDROCHLORIDE 100 MG: 50 TABLET, FILM COATED ORAL at 16:47

## 2024-02-06 RX ADMIN — MELATONIN TAB 3 MG 3 MG: 3 TAB at 21:17

## 2024-02-06 RX ADMIN — TRAZODONE HYDROCHLORIDE 100 MG: 100 TABLET ORAL at 21:17

## 2024-02-06 RX ADMIN — ESCITALOPRAM OXALATE 10 MG: 10 TABLET ORAL at 08:21

## 2024-02-06 NOTE — PROGRESS NOTES
02/06/24 1023   Team Meeting   Meeting Type Daily Rounds   Team Members Present   Team Members Present Physician;Nurse;   Physician Team Member Sabrina   Nursing Team Member [facility]   Care Management Team Member Dawood   Patient/Family Present   Patient Present No   Patient's Family Present No     Pt denies symptoms. Pt presents as scant and guarded, appears to be reacting to internal stimuli. Pt received PRN Atarax for anxiety and Trazodone for sleep; both were effective. Discharge TBD.

## 2024-02-06 NOTE — NURSING NOTE
Patient c/o insomnia , and received trazodone  @ 8317 Trazodone was effective patient is sleeping at this time.         Event Note

## 2024-02-06 NOTE — PROGRESS NOTES
Progress Note - Behavioral Health   Shaquille Ackerman 28 y.o. male MRN: [id number]  Unit/Bed#: [facility] 346-02 Encounter: [id number]    Subjective:   Per nursing report, patient has been anxious and requiring as needed medications for anxiety.  He has been generally cooperative on the unit.  He has been without any acute behavioral issues and remains medication compliant. Patient is guarded but pleasant and cooperative with interview.  He appears anxious and does endorse feeling anxiety frequently.  He reports tolerating medications fairly well.  He does feel that Atarax has been helpful for managing his anxiety while he is in the hospital.  He is agreeable to starting scheduled dose of Atarax.  He denies any SI, HI, or AVH.    Behavior over the last 24 hours:  unchanged  Sleep: normal  Appetite: normal  Medication side effects: No  ROS: no complaints and all other systems are negative    Mental Status Evaluation:  Appearance:  disheveled and marginal hygiene   Behavior:  calm, cooperative, and guarded   Speech:  normal rate and volume   Mood:  anxious   Affect:  constricted   Thought Process:  linear and goal directed   Associations: concrete associations   Thought Content:  no overt delusions   Perceptual Disturbances: denies auditory or visual hallucinations when asked, does not appear responding to internal stimuli   Risk Potential: Suicidal ideation - None  Homicidal ideation - None  Potential for aggression - Not at present   Sensorium:  oriented to person, place, and time/date   Memory:  recent and remote memory grossly intact   Consciousness:  alert and awake   Attention/Concentration: attention span and concentration are age appropriate   Insight:  limited   Judgment: limited   Gait/Station: normal gait/station, normal balance   Motor Activity: no abnormal movements     Medications: all current active meds have been reviewed.  Current Facility-Administered Medications   Medication Dose Route Frequency Provider Last  Rate    acetaminophen  650 mg Oral Q6H PRN Zakia Mal, DO      acetaminophen  650 mg Oral Q4H PRN Zakia Mal, DO      acetaminophen  975 mg Oral Q6H PRN Zakia Mal, DO      aluminum-magnesium hydroxide-simethicone  30 mL Oral Q4H PRN Zakia Mal, DO      haloperidol lactate  2.5 mg Intramuscular Q4H PRN Max 4/day Zakiaflores Resendez, DO      And    LORazepam  1 mg Intramuscular Q4H PRN Max 4/day Zakia Mal, DO      And    benztropine  0.5 mg Intramuscular Q4H PRN Max 4/day Zakia Mal, DO      haloperidol lactate  5 mg Intramuscular Q4H PRN Max 4/day Zakia Mal, DO      And    LORazepam  2 mg Intramuscular Q4H PRN Max 4/day Zakiaflores Resendez, DO      And    benztropine  1 mg Intramuscular Q4H PRN Max 4/day Zakia Mal, DO      benztropine  1 mg Intramuscular Q4H PRN Max 6/day Zakia Resendez, DO      benztropine  1 mg Oral Q4H PRN Max 6/day Zakiaflores Resendez, DO      hydrOXYzine HCL  50 mg Oral Q6H PRN Max 4/day Zakia Resendez, DO      Or    diphenhydrAMINE  50 mg Intramuscular Q6H PRN Zakia Resendez, DO      escitalopram  10 mg Oral Daily Gregory Bennett MD      Artificial Tears  1 drop Both Eyes Q3H PRN Zakia Resendez, DO      haloperidol  1 mg Oral Q6H PRN Zakia Resendez, DO      haloperidol  2.5 mg Oral Q4H PRN Max 4/day Zakiaflores Resendez, DO      haloperidol  5 mg Oral Q4H PRN Max 4/day Zakiaflores Resendez, DO      hydrOXYzine HCL  100 mg Oral Q6H PRN Max 4/day Zakia Resendez, DO      Or    LORazepam  2 mg Intramuscular Q6H PRN Zakia Mal, DO      hydrOXYzine HCL  25 mg Oral Q6H PRN Max 4/day Zakia Mal, DO      hydrOXYzine HCL  25 mg Oral BID Gregory Bennett MD      melatonin  3 mg Oral HS Gregory Bennett MD      polyethylene glycol  17 g Oral Daily PRN Zakia Resendez, DO      propranolol  10 mg Oral Q8H PRN Zakia Resendez, DO      senna-docusate sodium  1 tablet Oral Daily PRN Zakia Resendez, DO      traZODone  100 mg Oral HS PRN Gregory Bennett MD         Labs: I have  personally reviewed all pertinent laboratory/tests results  Most Recent Labs:   Lab Results   Component Value Date    WBC 4.42 02/01/2024    RBC 4.88 02/01/2024    HGB 15.3 02/01/2024    HCT 44.4 02/01/2024     02/01/2024    RDW 11.9 02/01/2024    NEUTROABS 2.87 02/01/2024    SODIUM 141 02/03/2024    K 4.0 02/03/2024     02/03/2024    CO2 27 02/03/2024    BUN 15 02/03/2024    CREATININE 1.17 02/03/2024    GLUC 83 02/03/2024    CALCIUM 9.2 02/03/2024    AST 17 02/03/2024    ALT 10 02/03/2024    ALKPHOS 42 02/03/2024    TP 6.4 02/03/2024    ALB 4.6 02/03/2024    TBILI 1.31 (H) 02/03/2024    XES3MSOBVROH 0.968 02/01/2024         Assessment/Plan   Principal Problem:    Major depressive disorder, recurrent episode (HCC)  Active Problems:    Medical clearance for psychiatric admission    Marijuana abuse    Serum total bilirubin elevated    Recommended Treatment:   Continue Lexapro 10 mg daily.  Start Atarax 25 mg twice daily.  Continue melatonin 3 mg nightly.  Continue all other medications at current doses as above.  Encourage group therapy, milieu therapy and occupational therapy  All current active medications have been reviewed  Encourage group therapy, milieu therapy and occupational therapy  Behavioral Health checks every 7 minutes    ----------------------------------------    Progress Toward Goals: progressing    Risks / Benefits of Treatment:    Risks, benefits, and possible side effects of medications explained to patient and patient verbalizes understanding and agreement for treatment.    Counseling / Coordination of Care:    Patient's progress discussed with staff in treatment team meeting.  Medications, treatment progress and treatment plan reviewed with patient.    Gregory Bennett MD 02/06/24

## 2024-02-06 NOTE — NURSING NOTE
Received patient at 1500. Patient approched the nurse station, reporting anxiety related to past and future thoughts. Administered Atarax 100 mg. Patient remained guarded during the conversation. Denies any depression, anxiety, a/v hallucinations. Patient remain Q 7 min check.       @ 4453 patient reported atarax was effective .

## 2024-02-06 NOTE — PLAN OF CARE
Problem: Ineffective Coping  Goal: Identifies ineffective coping skills  Outcome: Progressing  Goal: Identifies healthy coping skills  Outcome: Progressing  Goal: Demonstrates healthy coping skills  Outcome: Not Progressing  Goal: Participates in unit activities  Description: Interventions:  - Provide therapeutic environment   - Provide required programming   - Redirect inappropriate behaviors   Outcome: Progressing  Goal: Understands least restrictive measures  Description: Interventions:  - Utilize least restrictive behavior  Outcome: Progressing  Goal: Free from restraint events  Description: - Utilize least restrictive measures   - Provide behavioral interventions   - Redirect inappropriate behaviors   Outcome: Progressing     Problem: Depression  Goal: Treatment Goal: Demonstrate behavioral control of depressive symptoms, verbalize feelings of improved mood/affect, and adopt new coping skills prior to discharge  Outcome: Progressing  Goal: Verbalize thoughts and feelings  Description: Interventions:  - Assess and re-assess patient's level of risk   - Engage patient in 1:1 interactions, daily, for a minimum of 15 minutes   - Encourage patient to express feelings, fears, frustrations, hopes   Outcome: Progressing  Goal: Refrain from harming self  Description: Interventions:  - Monitor patient closely, per order   - Supervise medication ingestion, monitor effects and side effects   Outcome: Progressing  Goal: Refrain from isolation  Description: Interventions:  - Develop a trusting relationship   - Encourage socialization   Outcome: Progressing  Goal: Refrain from self-neglect  Outcome: Progressing  Goal: Attend and participate in unit activities, including therapeutic, recreational, and educational groups  Description: Interventions:  - Provide therapeutic and educational activities daily, encourage attendance and participation, and document same in the medical record   Outcome: Progressing  Goal: Complete  daily ADLs, including personal hygiene independently, as able  Description: Interventions:  - Observe, teach, and assist patient with ADLS  -  Monitor and promote a balance of rest/activity, with adequate nutrition and elimination   Outcome: Progressing     Problem: Anxiety  Goal: Anxiety is at manageable level  Description: Interventions:  - Assess and monitor patient's anxiety level.   - Monitor for signs and symptoms (heart palpitations, chest pain, shortness of breath, headaches, nausea, feeling jumpy, restlessness, irritable, apprehensive).   - Collaborate with interdisciplinary team and initiate plan and interventions as ordered.  - [place] patient to unit/surroundings  - Explain treatment plan  - Encourage participation in care  - Encourage verbalization of concerns/fears  - Identify coping mechanisms  - Assist in developing anxiety-reducing skills  - Administer/offer alternative therapies  - Limit or eliminate stimulants  Outcome: Progressing     Problem: Nutrition/Hydration-ADULT  Goal: Nutrient/Hydration intake appropriate for improving, restoring or maintaining nutritional needs  Description: Monitor and assess patient's nutrition/hydration status for malnutrition. Collaborate with interdisciplinary team and initiate plan and interventions as ordered.  Monitor patient's weight and dietary intake as ordered or per policy. Utilize nutrition screening tool and intervene as necessary. Determine patient's food preferences and provide high-protein, high-caloric foods as appropriate.     INTERVENTIONS:  - Monitor oral intake, urinary output, labs, and treatment plans  - Assess nutrition and hydration status and recommend course of action  - Evaluate amount of meals eaten  - Assist patient with eating if necessary   - Allow adequate time for meals  - Recommend/ encourage appropriate diets, oral nutritional supplements, and vitamin/mineral supplements  - Order, calculate, and assess calorie counts as needed  -  Recommend, monitor, and adjust tube feedings and TPN/PPN based on assessed needs  - Assess need for intravenous fluids  - Provide specific nutrition/hydration education as appropriate  - Include patient/family/caregiver in decisions related to nutrition  Outcome: Progressing

## 2024-02-06 NOTE — NURSING NOTE
Pt denies SI/HI/AH/VH. Pt appears paranoid. Present in milieu. Isolative to self. Medication and meal compliant. Scant/guarded with communication.  Pt later attended group.  No further concerns this afternoon. Plan of care ongoing.

## 2024-02-06 NOTE — NURSING NOTE
Pt c/o severe anxiety and having racing thoughts. Pt appears anxious and appeared to be quietly responding to internal stimuli. 1647 PRN Hydroxyzine 100 mg PO given.  1747 Pt reports that anxiety has improved. Pt appears more relaxed. No further concerns at this time.

## 2024-02-07 LAB
ALBUMIN SERPL BCP-MCNC: 4 G/DL (ref 3.5–5)
ALP SERPL-CCNC: 37 U/L (ref 34–104)
ALT SERPL W P-5'-P-CCNC: 9 U/L (ref 7–52)
ANION GAP SERPL CALCULATED.3IONS-SCNC: 3 MMOL/L
AST SERPL W P-5'-P-CCNC: 12 U/L (ref 13–39)
BILIRUB SERPL-MCNC: 0.43 MG/DL (ref 0.2–1)
BUN SERPL-MCNC: 20 MG/DL (ref 5–25)
CALCIUM SERPL-MCNC: 9.4 MG/DL (ref 8.4–10.2)
CHLORIDE SERPL-SCNC: 107 MMOL/L (ref 96–108)
CO2 SERPL-SCNC: 31 MMOL/L (ref 21–32)
CREAT SERPL-MCNC: 1.1 MG/DL (ref 0.6–1.3)
GFR SERPL CREATININE-BSD FRML MDRD: 89 ML/MIN/1.73SQ M
GLUCOSE P FAST SERPL-MCNC: 79 MG/DL (ref 65–99)
GLUCOSE SERPL-MCNC: 79 MG/DL (ref 65–140)
POTASSIUM SERPL-SCNC: 4 MMOL/L (ref 3.5–5.3)
PROT SERPL-MCNC: 5.9 G/DL (ref 6.4–8.4)
SODIUM SERPL-SCNC: 141 MMOL/L (ref 135–147)

## 2024-02-07 PROCEDURE — 80053 COMPREHEN METABOLIC PANEL: CPT | Performed by: NURSE PRACTITIONER

## 2024-02-07 PROCEDURE — 99232 SBSQ HOSP IP/OBS MODERATE 35: CPT | Performed by: STUDENT IN AN ORGANIZED HEALTH CARE EDUCATION/TRAINING PROGRAM

## 2024-02-07 RX ADMIN — HYDROXYZINE HYDROCHLORIDE 25 MG: 25 TABLET, FILM COATED ORAL at 17:08

## 2024-02-07 RX ADMIN — MELATONIN TAB 3 MG 3 MG: 3 TAB at 21:32

## 2024-02-07 RX ADMIN — ESCITALOPRAM OXALATE 10 MG: 10 TABLET ORAL at 08:07

## 2024-02-07 RX ADMIN — HYDROXYZINE HYDROCHLORIDE 25 MG: 25 TABLET, FILM COATED ORAL at 08:07

## 2024-02-07 RX ADMIN — TRAZODONE HYDROCHLORIDE 100 MG: 100 TABLET ORAL at 21:39

## 2024-02-07 NOTE — PROGRESS NOTES
02/07/24 0848   Team Meeting   Meeting Type Daily Rounds   Team Members Present   Team Members Present Physician;Nurse;   Physician Team Member Sabrina   Nursing Team Member [facility]   Care Management Team Member Kimberley   Patient/Family Present   Patient Present No   Patient's Family Present No     Pt isolative to self, denies symptoms. Med/meal compliant. 1800 reporting severe anxiety, appeared to be responding to internal stimuli. Received PRN Trazodone which was effective. Discharge TBD.    Quality 431: Preventive Care And Screening: Unhealthy Alcohol Use - Screening: Patient not identified as an unhealthy alcohol user when screened for unhealthy alcohol use using a systematic screening method Quality 110: Preventive Care And Screening: Influenza Immunization: Influenza Immunization Administered during Influenza season Quality 226: Preventive Care And Screening: Tobacco Use: Screening And Cessation Intervention: Patient screened for tobacco use and is an ex/non-smoker Detail Level: Detailed Quality 111:Pneumonia Vaccination Status For Older Adults: Pneumococcal vaccine administered on or after patient’s 60th birthday and before the end of the measurement period

## 2024-02-07 NOTE — PLAN OF CARE
Problem: Ineffective Coping  Goal: Identifies ineffective coping skills  Outcome: Progressing  Goal: Identifies healthy coping skills  Outcome: Progressing  Goal: Demonstrates healthy coping skills  Outcome: Progressing  Goal: Participates in unit activities  Description: Interventions:  - Provide therapeutic environment   - Provide required programming   - Redirect inappropriate behaviors   Outcome: Progressing  Goal: Patient/Family participate in treatment and DC plans  Description: Interventions:  - Provide therapeutic environment  Outcome: Progressing  Goal: Patient/Family verbalizes awareness of resources  Outcome: Progressing  Goal: Understands least restrictive measures  Description: Interventions:  - Utilize least restrictive behavior  Outcome: Progressing  Goal: Free from restraint events  Description: - Utilize least restrictive measures   - Provide behavioral interventions   - Redirect inappropriate behaviors   Outcome: Progressing     Problem: Depression  Goal: Treatment Goal: Demonstrate behavioral control of depressive symptoms, verbalize feelings of improved mood/affect, and adopt new coping skills prior to discharge  Outcome: Progressing  Goal: Verbalize thoughts and feelings  Description: Interventions:  - Assess and re-assess patient's level of risk   - Engage patient in 1:1 interactions, daily, for a minimum of 15 minutes   - Encourage patient to express feelings, fears, frustrations, hopes   Outcome: Progressing  Goal: Refrain from harming self  Description: Interventions:  - Monitor patient closely, per order   - Supervise medication ingestion, monitor effects and side effects   Outcome: Progressing  Goal: Refrain from isolation  Description: Interventions:  - Develop a trusting relationship   - Encourage socialization   Outcome: Progressing  Goal: Refrain from self-neglect  Outcome: Progressing  Goal: Attend and participate in unit activities, including therapeutic, recreational, and  educational groups  Description: Interventions:  - Provide therapeutic and educational activities daily, encourage attendance and participation, and document same in the medical record   Outcome: Progressing  Goal: Complete daily ADLs, including personal hygiene independently, as able  Description: Interventions:  - Observe, teach, and assist patient with ADLS  -  Monitor and promote a balance of rest/activity, with adequate nutrition and elimination   Outcome: Progressing     Problem: Anxiety  Goal: Anxiety is at manageable level  Description: Interventions:  - Assess and monitor patient's anxiety level.   - Monitor for signs and symptoms (heart palpitations, chest pain, shortness of breath, headaches, nausea, feeling jumpy, restlessness, irritable, apprehensive).   - Collaborate with interdisciplinary team and initiate plan and interventions as ordered.  - [place] patient to unit/surroundings  - Explain treatment plan  - Encourage participation in care  - Encourage verbalization of concerns/fears  - Identify coping mechanisms  - Assist in developing anxiety-reducing skills  - Administer/offer alternative therapies  - Limit or eliminate stimulants  Outcome: Progressing     Problem: Nutrition/Hydration-ADULT  Goal: Nutrient/Hydration intake appropriate for improving, restoring or maintaining nutritional needs  Description: Monitor and assess patient's nutrition/hydration status for malnutrition. Collaborate with interdisciplinary team and initiate plan and interventions as ordered.  Monitor patient's weight and dietary intake as ordered or per policy. Utilize nutrition screening tool and intervene as necessary. Determine patient's food preferences and provide high-protein, high-caloric foods as appropriate.     INTERVENTIONS:  - Monitor oral intake, urinary output, labs, and treatment plans  - Assess nutrition and hydration status and recommend course of action  - Evaluate amount of meals eaten  - Assist patient with  eating if necessary   - Allow adequate time for meals  - Recommend/ encourage appropriate diets, oral nutritional supplements, and vitamin/mineral supplements  - Order, calculate, and assess calorie counts as needed  - Recommend, monitor, and adjust tube feedings and TPN/PPN based on assessed needs  - Assess need for intravenous fluids  - Provide specific nutrition/hydration education as appropriate  - Include patient/family/caregiver in decisions related to nutrition  Outcome: Progressing     Problem: DISCHARGE PLANNING  Goal: Discharge to home or other facility with appropriate resources  Description: INTERVENTIONS:  - Identify barriers to discharge w/patient and caregiver  - Arrange for needed discharge resources and transportation as appropriate  - Identify discharge learning needs (meds, wound care, etc.)  - Arrange for interpretive services to assist at discharge as needed  - Refer to Case Management Department for coordinating discharge planning if the patient needs post-hospital services based on physician/advanced practitioner order or complex needs related to functional status, cognitive ability, or social support system  Outcome: Progressing

## 2024-02-07 NOTE — PROGRESS NOTES
"Progress Note - Behavioral Health   Shaquille Ackerman 28 y.o. male MRN: [id number]  Unit/Bed#: [facility] 346-02 Encounter: [id number]    Assessment/Plan   Principal Problem:    Major depressive disorder, recurrent episode (HCC)  Active Problems:    Medical clearance for psychiatric admission    Marijuana abuse    Serum total bilirubin elevated      Treatment Plan:   No psychopharmacological changes indicated at this time, continue with current regimen outlined below:    Escitalopram tablet 10 mg daily for anxiety and depression   Hydroxyzine 25 mg BID for anxiety   Melatonin tablet 3 mg HS for sleep     Continue with group therapy, milieu therapy and occupational therapy.    Obtain collateral information as indicated  Continue frequent safety checks and vitals per unit protocol.  Case discussed with treatment team.  Risks, benefits and possible side effects of Medications: Risks, benefits, and possible side effects of medications have been explained to the patient, who verbalizes understanding      Legal status: 201    Disposition: to be determined, discharge pending      ~~~~~~~~~~~~~~~~~~~~~~~~~~~~~~~~~~~~~~~~~~    Subjective: Per nursing report, Shaquille has been cooperative on the unit and compliant with scheduled medications. Over the past 24 hours, Shaquille did require hydroxyzine yesterday around 4:45 pm for anxiety and racing thoughts, and was noted by nursing to be quietly responding to internal stimuli. An hour later patient reported lessened anxiety. Shaquille requested trazodone 100 mg at 9:15 pm for sleep.     Shaquille was seen and evaluated for continuity of care. He reports no complaints.     Today, Shaquille denies active or passive suicidal or homicidal ideations. He denies any auditory or visual hallucinations at this time. He states that his mood is \"okay,\" that he had difficultly falling asleep, that his appetite is good, and that his energy is at is baseline.     Shaquille did mention that he would like to speak with a social " worker for his discharge plans. He is interested in applying for Medicaid, obtaining a part time job, and possibly staying with mother or father upon discharge.       Progress Toward Goals:  improvement    Psychiatric Review of Systems:  Behavior over the last 24 hours: unchanged  Sleep: frequent awakenings and difficulty falling asleep  Appetite: adequate  Medication side effects: none verbalized  ROS: Complete review of systems is negative except as noted above.    Vital signs in last 24 hours:  Temp:  [97.3 °F (36.3 °C)-97.5 °F (36.4 °C)] 97.3 °F (36.3 °C)  HR:  [60-70] 60  Resp:  [16] 16  BP: (133)/(60-68) 133/60    Mental Status Exam:  Appearance:  alert, disheveled , red, curly, messy hair , dressed in black T shirt with skeleton and blue scrub pants, intermittent eye contact, appears stated age, and thin    Behavior:  calm, cooperative, and guarded   Motor: no abnormal movements and normal gait and balance   Speech:  spontaneous, clear, and coherent   Mood:  anxious   Affect:  constricted   Thought Process:  organized, goal-directed, and linear and circumstantial   Thought Content: no overt delusions or paranoid   Perceptual disturbances: no reported hallucinations and does not appear to be responding to internal stimuli at this time   Cognition: oriented to self and situation, appears to be of average intelligence, and cognition not formally tested   Insight:  Improving   Judgment: Improving     Current Medications:  Current Facility-Administered Medications   Medication Dose Route Frequency Provider Last Rate    acetaminophen  650 mg Oral Q6H PRN Zakia Resendez, DO      acetaminophen  650 mg Oral Q4H PRN Zakia Resendez, DO      acetaminophen  975 mg Oral Q6H PRN Zakia Resendez DO      aluminum-magnesium hydroxide-simethicone  30 mL Oral Q4H PRN Zakia Resendez, DO      haloperidol lactate  2.5 mg Intramuscular Q4H PRN Max 4/day Zakia Resendez DO      And    LORazepam  1 mg Intramuscular Q4H PRN Max 4/day  Zakia Mal, DO      And    benztropine  0.5 mg Intramuscular Q4H PRN Max 4/day Zakia Mal, DO      haloperidol lactate  5 mg Intramuscular Q4H PRN Max 4/day Zakia Mal, DO      And    LORazepam  2 mg Intramuscular Q4H PRN Max 4/day Zakia Mal, DO      And    benztropine  1 mg Intramuscular Q4H PRN Max 4/day Zakia Mal, DO      benztropine  1 mg Intramuscular Q4H PRN Max 6/day Zakia Mal, DO      benztropine  1 mg Oral Q4H PRN Max 6/day Zakia Mal, DO      hydrOXYzine HCL  50 mg Oral Q6H PRN Max 4/day Zakia Mal, DO      Or    diphenhydrAMINE  50 mg Intramuscular Q6H PRN Zakia Mal, DO      escitalopram  10 mg Oral Daily Gregory Bennett MD      Artificial Tears  1 drop Both Eyes Q3H PRN Zakia Resendez, DO      haloperidol  1 mg Oral Q6H PRN Zakia Mal, DO      haloperidol  2.5 mg Oral Q4H PRN Max 4/day Zakia Mal, DO      haloperidol  5 mg Oral Q4H PRN Max 4/day Zakia Mal, DO      hydrOXYzine HCL  100 mg Oral Q6H PRN Max 4/day Zakia Mal, DO      Or    LORazepam  2 mg Intramuscular Q6H PRN Zakia Mal, DO      hydrOXYzine HCL  25 mg Oral Q6H PRN Max 4/day Zakia Mal, DO      hydrOXYzine HCL  25 mg Oral BID Gregory Bennett MD      melatonin  3 mg Oral HS Gregory Bennett MD      polyethylene glycol  17 g Oral Daily PRN Zakia Mal, DO      propranolol  10 mg Oral Q8H PRN Zakia Mal, DO      senna-docusate sodium  1 tablet Oral Daily PRN Zakia Resendez, DO      traZODone  100 mg Oral HS PRN Gregory Bennett MD         Behavioral Health Medications: all current active meds have been reviewed. Changes listed in treatment plan section above.    Laboratory results:  I have personally reviewed all pertinent laboratory/tests results.  Recent Results (from the past 48 hour(s))   Comprehensive metabolic panel    Collection Time: 02/07/24  5:57 AM   Result Value Ref Range    Sodium 141 135 - 147 mmol/L    Potassium 4.0 3.5 - 5.3 mmol/L     Chloride 107 96 - 108 mmol/L    CO2 31 21 - 32 mmol/L    ANION GAP 3 mmol/L    BUN 20 5 - 25 mg/dL    Creatinine 1.10 0.60 - 1.30 mg/dL    Glucose 79 65 - 140 mg/dL    Glucose, Fasting 79 65 - 99 mg/dL    Calcium 9.4 8.4 - 10.2 mg/dL    AST 12 (L) 13 - 39 U/L    ALT 9 7 - 52 U/L    Alkaline Phosphatase 37 34 - 104 U/L    Total Protein 5.9 (L) 6.4 - 8.4 g/dL    Albumin 4.0 3.5 - 5.0 g/dL    Total Bilirubin 0.43 0.20 - 1.00 mg/dL    eGFR 89 ml/min/1.73sq sammy Menendez

## 2024-02-07 NOTE — NURSING NOTE
Pt visible on unit walking halls and in dayroom. Quiet and to self, occasionally speaking with staff and peers. Denies symptoms on assessment but is scant and guarded in conversation. Appears preoccupied. Compliant with unit routines and care. Safety checks continue.

## 2024-02-07 NOTE — NURSING NOTE
Pt is visible on the unit but isolative to self. Pt is calm and cooperative upon approach. Pt is medication and meal compliant. Denies SI, HI, AH, and VH. Denies any needs at this time.

## 2024-02-08 PROCEDURE — 99232 SBSQ HOSP IP/OBS MODERATE 35: CPT | Performed by: STUDENT IN AN ORGANIZED HEALTH CARE EDUCATION/TRAINING PROGRAM

## 2024-02-08 RX ORDER — MIRTAZAPINE 7.5 MG/1
7.5 TABLET, FILM COATED ORAL
Status: DISCONTINUED | OUTPATIENT
Start: 2024-02-08 | End: 2024-03-11 | Stop reason: HOSPADM

## 2024-02-08 RX ADMIN — HYDROXYZINE HYDROCHLORIDE 25 MG: 25 TABLET, FILM COATED ORAL at 17:14

## 2024-02-08 RX ADMIN — HYDROXYZINE HYDROCHLORIDE 50 MG: 50 TABLET, FILM COATED ORAL at 18:33

## 2024-02-08 RX ADMIN — MELATONIN TAB 3 MG 3 MG: 3 TAB at 21:26

## 2024-02-08 RX ADMIN — ESCITALOPRAM OXALATE 10 MG: 10 TABLET ORAL at 08:14

## 2024-02-08 RX ADMIN — HYDROXYZINE HYDROCHLORIDE 25 MG: 25 TABLET, FILM COATED ORAL at 08:14

## 2024-02-08 RX ADMIN — MIRTAZAPINE 7.5 MG: 7.5 TABLET, FILM COATED ORAL at 21:31

## 2024-02-08 NOTE — PROGRESS NOTES
02/08/24 0845   Team Meeting   Meeting Type Daily Rounds   Team Members Present   Team Members Present Physician;Nurse;   Physician Team Member Sarbina   Nursing Team Member [facility]   Care Management Team Member Kimberley   Patient/Family Present   Patient Present No   Patient's Family Present No     Pt visible, isolative to self, social with one peer. Med/meal compliant. Discharge tbd.

## 2024-02-08 NOTE — NURSING NOTE
PT observed in the milieu interacting with a selected peer. PT denies SI/HI/VH/AH, compliant with HS and PRN Trazodone 100mg by request given at 2139 using A/S which was effective on re-assessment at 2239.

## 2024-02-08 NOTE — NURSING NOTE
"Pt c/o increased anxiety d/t \"thinking about what the future holds.\" Requested and received PRN Atarax 50mg at 1833.  "

## 2024-02-08 NOTE — PROGRESS NOTES
"Progress Note - Behavioral Health   Shaquille Ackerman 28 y.o. male MRN: [id number]  Unit/Bed#: -02 Encounter: [id number]    Assessment/Plan   Principal Problem:    Major depressive disorder, recurrent episode (HCC)  Active Problems:    Medical clearance for psychiatric admission    Marijuana abuse    Serum total bilirubin elevated      Treatment Plan:   No psychopharmacological changes indicated at this time, continue with current regimen outlined below:    Lexapro 10 mg daily for depression and anxiety   Hydroxyzine 25 mg BID for anxiety   Melatonin 3 mg HS for sleep    Remeron 7.5 mg HS PRN for sleep ordered    Continue with group therapy, milieu therapy and occupational therapy.    Obtain collateral information as indicated  Continue frequent safety checks and vitals per unit protocol.  Case discussed with treatment team.  Risks, benefits and possible side effects of Medications: Risks, benefits, and possible side effects of medications have been explained to the patient, who verbalizes understanding      Legal status: 201    Disposition: coordinating with case management, discharge to be determined      ~~~~~~~~~~~~~~~~~~~~~~~~~~~~~~~~~~~~~~~~~~    Subjective: Per nursing report, Shaquille has been cooperative on the unit and compliant with scheduled medications. Shaquille asked for 100 mg trazodone for sleep at 2139 which was found to be somewhat effective. This morning patient inquired about alternate sleeping medication, and Remeron 7.5 mg was suggested and ordered for as needed sleep medication.     Shaquille was seen and evaluated for continuity of care. He reports no complaints. He states that he called his parents to discuss staying with them after discharge, and states that father is thinking about it. He was informed that case management from the [facility] is going to come and discuss possible resources they can provide. Patient states mood is \"okay\" and that he still has trouble falling asleep, which is normal for " him. He states his appetite is adequate and his energy is good.     Today, Shaquille denies active or passive suicidal or homicidal ideations. He denied any auditory or visual hallucinations.     Progress Toward Goals:  improvement    Psychiatric Review of Systems:  Behavior over the last 24 hours: unchanged  Sleep: normal and difficulty falling asleep  Appetite: adequate  Medication side effects: none verbalized  ROS: Complete review of systems is negative except as noted above.    Vital signs in last 24 hours:  Temp:  [98.6 °F (37 °C)] 98.6 °F (37 °C)  HR:  [68] 68  Resp:  [16] 16  BP: (134)/(69) 134/69    Mental Status Exam:  Appearance:  alert, disheveled , red curly hair , dressed in black skeleton t shirt and blue scrub pants, intermittent eye contact, and thin    Behavior:  calm, cooperative, and guarded   Motor: no abnormal movements and normal gait and balance   Speech:  spontaneous, normal rate, normal volume, and coherent   Mood:  anxious   Affect:  constricted   Thought Process:  organized, goal-directed, and linear and circumstantial   Thought Content: no overt delusions or paranoid, no current active or passive suicidal ideations, and no current active or passive homicidal ideations    Perceptual disturbances: no reported hallucinations and does not appear to be responding to internal stimuli at this time   Cognition: oriented to self and situation, appears to be of average intelligence, and cognition not formally tested   Insight:  Improving   Judgment: Improving     Current Medications:  Current Facility-Administered Medications   Medication Dose Route Frequency Provider Last Rate    acetaminophen  650 mg Oral Q6H PRN Zakia Resendez, DO      acetaminophen  650 mg Oral Q4H PRN Zakia Resendez, DO      acetaminophen  975 mg Oral Q6H PRN Zakia Resendez, DO      aluminum-magnesium hydroxide-simethicone  30 mL Oral Q4H PRN Zakia Resendez, DO      haloperidol lactate  2.5 mg Intramuscular Q4H PRN Max 4/day  Zakia Mal, DO      And    LORazepam  1 mg Intramuscular Q4H PRN Max 4/day Zakia Mal, DO      And    benztropine  0.5 mg Intramuscular Q4H PRN Max 4/day Zakia Mal, DO      haloperidol lactate  5 mg Intramuscular Q4H PRN Max 4/day Zakia Mal, DO      And    LORazepam  2 mg Intramuscular Q4H PRN Max 4/day Zakia Mal, DO      And    benztropine  1 mg Intramuscular Q4H PRN Max 4/day Zakia Mal, DO      benztropine  1 mg Intramuscular Q4H PRN Max 6/day Zakia Mal, DO      benztropine  1 mg Oral Q4H PRN Max 6/day Zakia Mal, DO      hydrOXYzine HCL  50 mg Oral Q6H PRN Max 4/day Zakia Mal, DO      Or    diphenhydrAMINE  50 mg Intramuscular Q6H PRN Zakia Mal, DO      escitalopram  10 mg Oral Daily Gregory Bennett MD      Artificial Tears  1 drop Both Eyes Q3H PRN Zakia Mal, DO      haloperidol  1 mg Oral Q6H PRN Zakia Mal, DO      haloperidol  2.5 mg Oral Q4H PRN Max 4/day Zakia Mal, DO      haloperidol  5 mg Oral Q4H PRN Max 4/day Zakia Mal, DO      hydrOXYzine HCL  100 mg Oral Q6H PRN Max 4/day Zakia Mal, DO      Or    LORazepam  2 mg Intramuscular Q6H PRN Zakia Mal, DO      hydrOXYzine HCL  25 mg Oral Q6H PRN Max 4/day Zakia Mal, DO      hydrOXYzine HCL  25 mg Oral BID Gregory Bennett MD      melatonin  3 mg Oral HS Gregory Bennett MD      polyethylene glycol  17 g Oral Daily PRN Zakia Mal, DO      propranolol  10 mg Oral Q8H PRN Zakia Mal, DO      senna-docusate sodium  1 tablet Oral Daily PRN Zakia Mal, DO      traZODone  100 mg Oral HS PRN Gregory Bennett MD         Behavioral Health Medications: all current active meds have been reviewed. Changes listed in treatment plan section above.    Laboratory results:  I have personally reviewed all pertinent laboratory/tests results.  Recent Results (from the past 48 hour(s))   Comprehensive metabolic panel    Collection Time: 02/07/24  5:57 AM   Result Value  Ref Range    Sodium 141 135 - 147 mmol/L    Potassium 4.0 3.5 - 5.3 mmol/L    Chloride 107 96 - 108 mmol/L    CO2 31 21 - 32 mmol/L    ANION GAP 3 mmol/L    BUN 20 5 - 25 mg/dL    Creatinine 1.10 0.60 - 1.30 mg/dL    Glucose 79 65 - 140 mg/dL    Glucose, Fasting 79 65 - 99 mg/dL    Calcium 9.4 8.4 - 10.2 mg/dL    AST 12 (L) 13 - 39 U/L    ALT 9 7 - 52 U/L    Alkaline Phosphatase 37 34 - 104 U/L    Total Protein 5.9 (L) 6.4 - 8.4 g/dL    Albumin 4.0 3.5 - 5.0 g/dL    Total Bilirubin 0.43 0.20 - 1.00 mg/dL    eGFR 89 ml/min/1.73sq sammy Menendez

## 2024-02-08 NOTE — NURSING NOTE
Pt awake, calm and cooperative on approach. Compliant with scheduled medications and meals, appetite good. Taking medications in applesauce per pt request. Denies SI/HI/AVH at this time. Isolative to self. Scant in conversation. No behavioral concerns to be noted. Denies any unmet needs. Q7 minute safety checks maintained.

## 2024-02-09 PROCEDURE — 99232 SBSQ HOSP IP/OBS MODERATE 35: CPT | Performed by: STUDENT IN AN ORGANIZED HEALTH CARE EDUCATION/TRAINING PROGRAM

## 2024-02-09 RX ORDER — HYDROXYZINE 50 MG/1
50 TABLET, FILM COATED ORAL 2 TIMES DAILY
Status: DISCONTINUED | OUTPATIENT
Start: 2024-02-09 | End: 2024-02-13

## 2024-02-09 RX ADMIN — MELATONIN TAB 3 MG 3 MG: 3 TAB at 21:19

## 2024-02-09 RX ADMIN — MIRTAZAPINE 7.5 MG: 7.5 TABLET, FILM COATED ORAL at 21:19

## 2024-02-09 RX ADMIN — ESCITALOPRAM OXALATE 10 MG: 10 TABLET ORAL at 08:58

## 2024-02-09 RX ADMIN — HYDROXYZINE HYDROCHLORIDE 50 MG: 50 TABLET, FILM COATED ORAL at 18:04

## 2024-02-09 RX ADMIN — HYDROXYZINE HYDROCHLORIDE 25 MG: 25 TABLET, FILM COATED ORAL at 08:58

## 2024-02-09 NOTE — PLAN OF CARE
Problem: Ineffective Coping  Goal: Demonstrates healthy coping skills  Outcome: Progressing  Goal: Participates in unit activities  Description: Interventions:  - Provide therapeutic environment   - Provide required programming   - Redirect inappropriate behaviors   Outcome: Progressing   Patient is active in group therapy exploring his behaviors and how he needs to change.

## 2024-02-09 NOTE — NURSING NOTE
Patient has been visible on the unit, social, and attended group.  He is med/meal compliant and denies SI/HI and A/V hallucinations.

## 2024-02-09 NOTE — NURSING NOTE
PT observed visible in the dayroom with other peers. Cooperative and pleasure on approach. PT denies SI/HI/VH/AH, compliant with HS meds, PRN Remeron 7.5mg for insomnia given at 2131 and will continue to monitor at this time.       2231, PRN Remeron seem effective as PT observed in bed sleeping at this time.

## 2024-02-09 NOTE — PLAN OF CARE
Problem: Ineffective Coping  Goal: Identifies ineffective coping skills  Outcome: Progressing  Goal: Identifies healthy coping skills  Outcome: Progressing  Goal: Demonstrates healthy coping skills  Outcome: Progressing  Goal: Participates in unit activities  Description: Interventions:  - Provide therapeutic environment   - Provide required programming   - Redirect inappropriate behaviors   Outcome: Progressing  Goal: Patient/Family participate in treatment and DC plans  Description: Interventions:  - Provide therapeutic environment  Outcome: Progressing  Goal: Patient/Family verbalizes awareness of resources  Outcome: Progressing  Goal: Understands least restrictive measures  Description: Interventions:  - Utilize least restrictive behavior  Outcome: Progressing  Goal: Free from restraint events  Description: - Utilize least restrictive measures   - Provide behavioral interventions   - Redirect inappropriate behaviors   Outcome: Progressing     Problem: Depression  Goal: Treatment Goal: Demonstrate behavioral control of depressive symptoms, verbalize feelings of improved mood/affect, and adopt new coping skills prior to discharge  Outcome: Progressing  Goal: Verbalize thoughts and feelings  Description: Interventions:  - Assess and re-assess patient's level of risk   - Engage patient in 1:1 interactions, daily, for a minimum of 15 minutes   - Encourage patient to express feelings, fears, frustrations, hopes   Outcome: Progressing  Goal: Refrain from harming self  Description: Interventions:  - Monitor patient closely, per order   - Supervise medication ingestion, monitor effects and side effects   Outcome: Progressing  Goal: Refrain from isolation  Description: Interventions:  - Develop a trusting relationship   - Encourage socialization   Outcome: Progressing  Goal: Refrain from self-neglect  Outcome: Progressing  Goal: Attend and participate in unit activities, including therapeutic, recreational, and  educational groups  Description: Interventions:  - Provide therapeutic and educational activities daily, encourage attendance and participation, and document same in the medical record   Outcome: Progressing  Goal: Complete daily ADLs, including personal hygiene independently, as able  Description: Interventions:  - Observe, teach, and assist patient with ADLS  -  Monitor and promote a balance of rest/activity, with adequate nutrition and elimination   Outcome: Progressing     Problem: Anxiety  Goal: Anxiety is at manageable level  Description: Interventions:  - Assess and monitor patient's anxiety level.   - Monitor for signs and symptoms (heart palpitations, chest pain, shortness of breath, headaches, nausea, feeling jumpy, restlessness, irritable, apprehensive).   - Collaborate with interdisciplinary team and initiate plan and interventions as ordered.  - Sandy patient to unit/surroundings  - Explain treatment plan  - Encourage participation in care  - Encourage verbalization of concerns/fears  - Identify coping mechanisms  - Assist in developing anxiety-reducing skills  - Administer/offer alternative therapies  - Limit or eliminate stimulants  Outcome: Progressing     Problem: Nutrition/Hydration-ADULT  Goal: Nutrient/Hydration intake appropriate for improving, restoring or maintaining nutritional needs  Description: Monitor and assess patient's nutrition/hydration status for malnutrition. Collaborate with interdisciplinary team and initiate plan and interventions as ordered.  Monitor patient's weight and dietary intake as ordered or per policy. Utilize nutrition screening tool and intervene as necessary. Determine patient's food preferences and provide high-protein, high-caloric foods as appropriate.     INTERVENTIONS:  - Monitor oral intake, urinary output, labs, and treatment plans  - Assess nutrition and hydration status and recommend course of action  - Evaluate amount of meals eaten  - Assist patient with  eating if necessary   - Allow adequate time for meals  - Recommend/ encourage appropriate diets, oral nutritional supplements, and vitamin/mineral supplements  - Order, calculate, and assess calorie counts as needed  - Recommend, monitor, and adjust tube feedings and TPN/PPN based on assessed needs  - Assess need for intravenous fluids  - Provide specific nutrition/hydration education as appropriate  - Include patient/family/caregiver in decisions related to nutrition  Outcome: Progressing     Problem: DISCHARGE PLANNING  Goal: Discharge to home or other facility with appropriate resources  Description: INTERVENTIONS:  - Identify barriers to discharge w/patient and caregiver  - Arrange for needed discharge resources and transportation as appropriate  - Identify discharge learning needs (meds, wound care, etc.)  - Arrange for interpretive services to assist at discharge as needed  - Refer to Case Management Department for coordinating discharge planning if the patient needs post-hospital services based on physician/advanced practitioner order or complex needs related to functional status, cognitive ability, or social support system  Outcome: Progressing

## 2024-02-09 NOTE — PROGRESS NOTES
02/09/24 0922   Team Meeting   Meeting Type Daily Rounds   Team Members Present   Team Members Present Physician;Nurse;   Physician Team Member Sabrina   [facility] Kimberley   Patient/Family Present   Patient Present No   Patient's Family Present No     Pt med/meal compliant. Visible on the unit. Atarax to be increased today. Appears tense at times.  Discharge to be determined.

## 2024-02-09 NOTE — PROGRESS NOTES
"Progress Note - Behavioral Health   Shaquille Ackerman 28 y.o. male MRN: [id number]  Unit/Bed#: [facility] 346-02 Encounter: [id number]    Assessment/Plan   Principal Problem:    Major depressive disorder, recurrent episode (HCC)  Active Problems:    Medical clearance for psychiatric admission    Marijuana abuse    Serum total bilirubin elevated      Treatment Plan:     Continue psychopharmacological regimen with the following changes bolded below:    Lexapro 10 mg daily for anxiety and depression  Melatonin 3 mg HS for sleep     Increase Atarax to 50 mg BID for anxiety     Continue with group therapy, milieu therapy and occupational therapy.    Obtain collateral information as indicated  Continue frequent safety checks and vitals per unit protocol.  Case discussed with treatment team.  Risks, benefits and possible side effects of Medications: Risks, benefits, and possible side effects of medications have been explained to the patient, who verbalizes understanding      Legal status: 201    Disposition: discharge pending, coordinating with case management       ~~~~~~~~~~~~~~~~~~~~~~~~~~~~~~~~~~~~~~~~~~    Subjective: Per nursing report, Shaquille has been cooperative on the unit and compliant with scheduled medications. Over the past 24 hours, Shaquille requested Atarax 50 mg for anxiety and Remeron 7.5 mg for sleep, which was effective.     Shaquille was seen and evaluated for continuity of care. He reports no complaints. He describes his mood as \"fine.\" He states that he is unsure if the Remeron was more effective than the Trazodone, but that he slept decent. He states his appetite is adequate and energy is fine. [facility] to discuss resources for him. He states that he is interested in family therapy with his mother and father.     Today, Shaquille denies active or passive suicidal or homicidal ideations. He denies any visual or auditory hallucinations.       Progress " CC:  Emma Saji is here for office visit- abnormal pap.    Medications: Were verified and updated on EPIC  Refills? No    Latex allergy or sensitivity: No    Communication of results: Cell Phone:   Telephone Information:   Mobile [phone]   Okay to leave a message containing results? Yes     Last PAP: LSIL, cannot exclude ASC-H     Urine sample was not clinically indicated per MD     If your visit includes an exam today, would you like an assistant to be present in the room during that time? No        Toward Goals: improvement    Psychiatric Review of Systems:  Behavior over the last 24 hours: unchanged  Sleep: difficulty falling asleep  Appetite: adequate  Medication side effects: none verbalized  ROS: Complete review of systems is negative except as noted above.    Vital signs in last 24 hours:  Temp:  [97.6 °F (36.4 °C)-98.3 °F (36.8 °C)] 97.6 °F (36.4 °C)  HR:  [65-66] 66  Resp:  [14-16] 14  BP: (127-131)/(58-59) 131/58    Mental Status Exam:  Appearance:  alert, appropriate grooming and hygiene, red curly hair , intermittent eye contact, and thin    Behavior:  calm and cooperative   Motor: no abnormal movements and normal gait and balance   Speech:  spontaneous, normal rate, normal volume, and coherent   Mood:  anxious   Affect:  mood-congruent and anxious   Thought Process:  tangential    Thought Content: no overt delusions or paranoid, no current active or passive suicidal ideations, and no current active or passive homicidal ideations    Perceptual disturbances: no reported hallucinations and does not appear to be responding to internal stimuli at this time   Cognition: oriented to self and situation, appears to be of average intelligence, and cognition not formally tested   Insight:  Fair   Judgment: Fair     Current Medications:  Current Facility-Administered Medications   Medication Dose Route Frequency Provider Last Rate    acetaminophen  650 mg Oral Q6H PRN Zakia Resendez, DO      acetaminophen  650 mg Oral Q4H PRN Zakia Resendez, DO      acetaminophen  975 mg Oral Q6H PRN Zakia Resendez, DO      aluminum-magnesium hydroxide-simethicone  30 mL Oral Q4H PRN Zakia Resendez, DO      haloperidol lactate  2.5 mg Intramuscular Q4H PRN Max 4/day Zakia Resendez DO      And    LORazepam  1 mg Intramuscular Q4H PRN Max 4/day Zakia Resendez DO      And    benztropine  0.5 mg Intramuscular Q4H PRN Max 4/day Zakia Resendez, DO      haloperidol lactate  5 mg Intramuscular Q4H PRN Max 4/day Zakia Resendez, DO       And    LORazepam  2 mg Intramuscular Q4H PRN Max 4/day Zakia Mal, DO      And    benztropine  1 mg Intramuscular Q4H PRN Max 4/day Zakia Mal, DO      benztropine  1 mg Intramuscular Q4H PRN Max 6/day Zakia Mal, DO      benztropine  1 mg Oral Q4H PRN Max 6/day Zakia Mal, DO      hydrOXYzine HCL  50 mg Oral Q6H PRN Max 4/day Zakia Mal, DO      Or    diphenhydrAMINE  50 mg Intramuscular Q6H PRN Zakia Mal, DO      escitalopram  10 mg Oral Daily Gregory Bennett MD      Artificial Tears  1 drop Both Eyes Q3H PRN Zakia Mal, DO      haloperidol  1 mg Oral Q6H PRN Zakia Mal, DO      haloperidol  2.5 mg Oral Q4H PRN Max 4/day Zakia Mal, DO      haloperidol  5 mg Oral Q4H PRN Max 4/day Zakia Mal, DO      hydrOXYzine HCL  100 mg Oral Q6H PRN Max 4/day Zakia Mal, DO      Or    LORazepam  2 mg Intramuscular Q6H PRN Zakia Mal, DO      hydrOXYzine HCL  25 mg Oral Q6H PRN Max 4/day Zakia Mal, DO      hydrOXYzine HCL  25 mg Oral BID Gregory Bennett MD      melatonin  3 mg Oral HS Gregory Bennett MD      mirtazapine  7.5 mg Oral HS PRN Gregory Bennett MD      polyethylene glycol  17 g Oral Daily PRN Zakia Mal, DO      propranolol  10 mg Oral Q8H PRN Zakia Mal, DO      senna-docusate sodium  1 tablet Oral Daily PRN Zakia Resendez, DO         Behavioral Health Medications: all current active meds have been reviewed. Changes listed in treatment plan section above.    Laboratory results:  I have personally reviewed all pertinent laboratory/tests results.  No results found for this or any previous visit (from the past 48 hour(s)).     Emily Menendez

## 2024-02-10 PROCEDURE — 99232 SBSQ HOSP IP/OBS MODERATE 35: CPT | Performed by: PSYCHIATRY & NEUROLOGY

## 2024-02-10 RX ADMIN — MIRTAZAPINE 7.5 MG: 7.5 TABLET, FILM COATED ORAL at 21:09

## 2024-02-10 RX ADMIN — HYDROXYZINE HYDROCHLORIDE 50 MG: 50 TABLET, FILM COATED ORAL at 10:14

## 2024-02-10 RX ADMIN — MELATONIN TAB 3 MG 3 MG: 3 TAB at 21:09

## 2024-02-10 RX ADMIN — ESCITALOPRAM OXALATE 10 MG: 10 TABLET ORAL at 10:14

## 2024-02-10 RX ADMIN — HYDROXYZINE HYDROCHLORIDE 50 MG: 50 TABLET, FILM COATED ORAL at 17:42

## 2024-02-10 NOTE — PROGRESS NOTES
Progress Note - Behavioral Health     Shaquille Ackerman 28 y.o. male MRN: [id number]   Unit/Bed#: [facility] 346-02 Encounter: [id number]      Subjective: Patient chart reviewed and case discussed with the nurse.  The patient was seen in the milieu today.  He reports he is feeling very anxious.  Denies any specific stressors.  Reports depression also has not been bad and rates it at 4 on a scale of 0-10 with 10 being the worst.  Denies any suicidal thoughts or any urges to hurt others.  Reports sleep has not been good.  Reports appetite has been okay.  Denies any auditory or visual hallucination.  Does not endorse any paranoia or delusional ideation during the meeting.  The patient will presented slightly bizarre and kept staring intensely throughout the meeting.    As per the nurse the patient and cooperative in the milieu.  Had denied any mental health symptoms to the staff.  Compliant with medication.  Got as needed of Remeron 7.5 mg last night for sleep.  Has been doing well today.  No concerns reported.          ROS: no complaints, all other systems are negative    Mental Status Evaluation:    Appearance:  casually dressed   Behavior:  bizarre   Speech:  normal rate, normal volume, normal pitch   Mood:  anxious, mildly depressed   Affect:  blunted   Thought Process:  linear   Associations: intact associations   Thought Content:  no overt delusions   Perceptual Disturbances: no auditory hallucinations, no visual hallucinations   Risk Potential: Suicidal ideation - None  Homicidal ideation - None  Potential for aggression - No   Sensorium:  oriented to person, place, and time/date   Memory:  recent and remote memory grossly intact   Consciousness:  alert and awake   Attention: poor concentration   Insight:  poor   Judgment: poor   Gait/Station: normal gait/station, normal balance   Motor Activity: no abnormal movements     Vital signs in last 24 hours:    Temp:  [97.5 °F (36.4 °C)-98.1 °F (36.7 °C)] 97.5 °F (36.4 °C)  HR:   [75-81] 81  Resp:  [16] 16  BP: (118-136)/(58-67) 118/58    Laboratory results: I have personally reviewed all pertinent laboratory/tests results.  Most Recent Labs:   Lab Results   Component Value Date    WBC 4.42 02/01/2024    RBC 4.88 02/01/2024    HGB 15.3 02/01/2024    HCT 44.4 02/01/2024     02/01/2024    RDW 11.9 02/01/2024    NEUTROABS 2.87 02/01/2024    SODIUM 141 02/07/2024    K 4.0 02/07/2024     02/07/2024    CO2 31 02/07/2024    BUN 20 02/07/2024    CREATININE 1.10 02/07/2024    GLUC 79 02/07/2024    GLUF 79 02/07/2024    CALCIUM 9.4 02/07/2024    AST 12 (L) 02/07/2024    ALT 9 02/07/2024    ALKPHOS 37 02/07/2024    TP 5.9 (L) 02/07/2024    ALB 4.0 02/07/2024    TBILI 0.43 02/07/2024    [id number] 0.968 02/01/2024       Progress Toward Goals: improving slowly    Assessment/Plan the patient overall endorses mild anxiety and depression.  Some bizarre behavior.  Overall doing much better.  Does not endorse any hallucination or paranoia or delusion.  The plan is to continue with current medication with no changes.  We will continue to monitor the patient for his mood, behavior, safety, sleep and response to meds while he is here in the unit  Principal Problem:    Major depressive disorder, recurrent episode (HCC)  Active Problems:    Medical clearance for psychiatric admission    Marijuana abuse    Serum total bilirubin elevated    Recommended Treatment:     Planned medication and treatment changes:    All current active medications have been reviewed  Encourage group therapy, milieu therapy and occupational therapy  Behavioral Health checks every 7 minutes  Continue current medications:  Current Facility-Administered Medications   Medication Dose Route Frequency Provider Last Rate    acetaminophen  650 mg Oral Q6H PRN Zakia Mal, DO      acetaminophen  650 mg Oral Q4H PRN Zakia Mal, DO      acetaminophen  975 mg Oral Q6H PRN Zakia Mal, DO      aluminum-magnesium  hydroxide-simethicone  30 mL Oral Q4H PRN Zakia Resendez, DO      haloperidol lactate  2.5 mg Intramuscular Q4H PRN Max 4/day Zakia Resendez, DO      And    LORazepam  1 mg Intramuscular Q4H PRN Max 4/day Zakia Resendez, DO      And    benztropine  0.5 mg Intramuscular Q4H PRN Max 4/day Zakia Resendez, DO      haloperidol lactate  5 mg Intramuscular Q4H PRN Max 4/day Zakia Resendez, DO      And    LORazepam  2 mg Intramuscular Q4H PRN Max 4/day Zakia Resendez, DO      And    benztropine  1 mg Intramuscular Q4H PRN Max 4/day Zakia Resendez, DO      benztropine  1 mg Intramuscular Q4H PRN Max 6/day Zakia Resendez, DO      benztropine  1 mg Oral Q4H PRN Max 6/day Zakia Resendez, DO      hydrOXYzine HCL  50 mg Oral Q6H PRN Max 4/day Zakia Resendez, DO      Or    diphenhydrAMINE  50 mg Intramuscular Q6H PRN Zakia Resendez, DO      escitalopram  10 mg Oral Daily Gregory Bennett MD      Artificial Tears  1 drop Both Eyes Q3H PRN Zakia Resendez, DO      haloperidol  1 mg Oral Q6H PRN Zakia Resendez, DO      haloperidol  2.5 mg Oral Q4H PRN Max 4/day Zakia Resendez, DO      haloperidol  5 mg Oral Q4H PRN Max 4/day Zakia Resendez, DO      hydrOXYzine HCL  100 mg Oral Q6H PRN Max 4/day Zakia Resendez, DO      Or    LORazepam  2 mg Intramuscular Q6H PRN Zakia Resendez, DO      hydrOXYzine HCL  25 mg Oral Q6H PRN Max 4/day Zakia Resendez, DO      hydrOXYzine HCL  50 mg Oral BID Gregory Bennett MD      melatonin  3 mg Oral HS Gregory Bennett MD      mirtazapine  7.5 mg Oral HS PRN Gregory Bennett MD      polyethylene glycol  17 g Oral Daily PRN Zakia Resendez, DO      propranolol  10 mg Oral Q8H PRN Zakia Resendez, DO      senna-docusate sodium  1 tablet Oral Daily PRN Zakia Resendez, DO         Risks / Benefits of Treatment:    Risks, benefits, and possible side effects of medications explained to patient and patient verbalizes understanding and agreement for treatment.    Counseling / Coordination of  Care:    Patient's progress discussed with staff in treatment team meeting.  Medications, treatment progress and treatment plan reviewed with patient.    Belinda Avelar MD 02/10/24

## 2024-02-10 NOTE — NURSING NOTE
PT visible, calm and cooperative on approach, PT denies SI/HI/VH/AH with no depression nor anxiety reported. PT compliant with HS, requesting PRN Remeron 7.5mg for insomnia given at 2119.  Denies any other unmet needs at this time.     2219  re-assessment of Remeron being effective as PT is resting comfortably.

## 2024-02-10 NOTE — PLAN OF CARE
Problem: Ineffective Coping  Goal: Identifies ineffective coping skills  Outcome: Progressing  Goal: Identifies healthy coping skills  Outcome: Progressing  Goal: Demonstrates healthy coping skills  Outcome: Progressing  Goal: Participates in unit activities  Description: Interventions:  - Provide therapeutic environment   - Provide required programming   - Redirect inappropriate behaviors   Outcome: Progressing  Goal: Patient/Family participate in treatment and DC plans  Description: Interventions:  - Provide therapeutic environment  Outcome: Progressing  Goal: Patient/Family verbalizes awareness of resources  Outcome: Progressing  Goal: Understands least restrictive measures  Description: Interventions:  - Utilize least restrictive behavior  Outcome: Progressing  Goal: Free from restraint events  Description: - Utilize least restrictive measures   - Provide behavioral interventions   - Redirect inappropriate behaviors   Outcome: Progressing     Problem: Depression  Goal: Treatment Goal: Demonstrate behavioral control of depressive symptoms, verbalize feelings of improved mood/affect, and adopt new coping skills prior to discharge  Outcome: Progressing  Goal: Verbalize thoughts and feelings  Description: Interventions:  - Assess and re-assess patient's level of risk   - Engage patient in 1:1 interactions, daily, for a minimum of 15 minutes   - Encourage patient to express feelings, fears, frustrations, hopes   Outcome: Progressing  Goal: Refrain from harming self  Description: Interventions:  - Monitor patient closely, per order   - Supervise medication ingestion, monitor effects and side effects   Outcome: Progressing  Goal: Refrain from isolation  Description: Interventions:  - Develop a trusting relationship   - Encourage socialization   Outcome: Progressing  Goal: Refrain from self-neglect  Outcome: Progressing  Goal: Attend and participate in unit activities, including therapeutic, recreational, and  educational groups  Description: Interventions:  - Provide therapeutic and educational activities daily, encourage attendance and participation, and document same in the medical record   Outcome: Progressing  Goal: Complete daily ADLs, including personal hygiene independently, as able  Description: Interventions:  - Observe, teach, and assist patient with ADLS  -  Monitor and promote a balance of rest/activity, with adequate nutrition and elimination   Outcome: Progressing     Problem: Anxiety  Goal: Anxiety is at manageable level  Description: Interventions:  - Assess and monitor patient's anxiety level.   - Monitor for signs and symptoms (heart palpitations, chest pain, shortness of breath, headaches, nausea, feeling jumpy, restlessness, irritable, apprehensive).   - Collaborate with interdisciplinary team and initiate plan and interventions as ordered.  - [place] patient to unit/surroundings  - Explain treatment plan  - Encourage participation in care  - Encourage verbalization of concerns/fears  - Identify coping mechanisms  - Assist in developing anxiety-reducing skills  - Administer/offer alternative therapies  - Limit or eliminate stimulants  Outcome: Progressing     Problem: Nutrition/Hydration-ADULT  Goal: Nutrient/Hydration intake appropriate for improving, restoring or maintaining nutritional needs  Description: Monitor and assess patient's nutrition/hydration status for malnutrition. Collaborate with interdisciplinary team and initiate plan and interventions as ordered.  Monitor patient's weight and dietary intake as ordered or per policy. Utilize nutrition screening tool and intervene as necessary. Determine patient's food preferences and provide high-protein, high-caloric foods as appropriate.     INTERVENTIONS:  - Monitor oral intake, urinary output, labs, and treatment plans  - Assess nutrition and hydration status and recommend course of action  - Evaluate amount of meals eaten  - Assist patient with  eating if necessary   - Allow adequate time for meals  - Recommend/ encourage appropriate diets, oral nutritional supplements, and vitamin/mineral supplements  - Order, calculate, and assess calorie counts as needed  - Recommend, monitor, and adjust tube feedings and TPN/PPN based on assessed needs  - Assess need for intravenous fluids  - Provide specific nutrition/hydration education as appropriate  - Include patient/family/caregiver in decisions related to nutrition  Outcome: Progressing

## 2024-02-10 NOTE — NURSING NOTE
Patient has been visible on the unit and attended group.  He denies SI/HI and A/V hallucinations.  Patient med/meal compliant.  He reports no needs at this time.  Will continue with plan of care.

## 2024-02-11 PROCEDURE — 99232 SBSQ HOSP IP/OBS MODERATE 35: CPT | Performed by: PSYCHIATRY & NEUROLOGY

## 2024-02-11 RX ADMIN — MELATONIN TAB 3 MG 3 MG: 3 TAB at 21:02

## 2024-02-11 RX ADMIN — HYDROXYZINE HYDROCHLORIDE 50 MG: 50 TABLET, FILM COATED ORAL at 17:19

## 2024-02-11 RX ADMIN — MIRTAZAPINE 7.5 MG: 7.5 TABLET, FILM COATED ORAL at 21:02

## 2024-02-11 RX ADMIN — HYDROXYZINE HYDROCHLORIDE 100 MG: 50 TABLET, FILM COATED ORAL at 12:35

## 2024-02-11 RX ADMIN — HYDROXYZINE HYDROCHLORIDE 50 MG: 50 TABLET, FILM COATED ORAL at 10:09

## 2024-02-11 RX ADMIN — ESCITALOPRAM OXALATE 10 MG: 10 TABLET ORAL at 10:09

## 2024-02-11 NOTE — PROGRESS NOTES
Progress Note - Behavioral Health     Shaquille Ackerman 28 y.o. male MRN: [id number]   Unit/Bed#: [facility] 346-02 Encounter: [id number]      Subjective: Patient chart reviewed and case discussed with the nurse.  The patient was seen in the milieu today.  He reports he continues to feel anxious.  Denies any specific stressors.  Reports depression has been okay.  Denies any suicidal thoughts or any urges to hurt others.  Reports sleep has not been good and waking up throughout the night.  Reports appetite has been okay.  Denies any auditory or visual hallucination.  Does not endorse any paranoia or delusional ideation during the meeting.      As per the nurse the patient and cooperative in the milieu.  Had denied any mental health symptoms to the staff.  Compliant with medication.  Got as needed of Remeron 7.5 mg last night for sleep which was effective.  Has been doing well today.  No concerns reported.          ROS: no complaints, all other systems are negative    Mental Status Evaluation:    Appearance:  casually dressed   Behavior:  Calm and cooperative   Speech:  normal rate, normal volume, normal pitch   Mood:  anxious, mildly depressed   Affect:  blunted   Thought Process:  linear   Associations: intact associations   Thought Content:  no overt delusions   Perceptual Disturbances: no auditory hallucinations, no visual hallucinations   Risk Potential: Suicidal ideation - None  Homicidal ideation - None  Potential for aggression - No   Sensorium:  oriented to person, place, and time/date   Memory:  recent and remote memory grossly intact   Consciousness:  alert and awake   Attention: poor concentration   Insight:  poor   Judgment: poor   Gait/Station: normal gait/station, normal balance   Motor Activity: no abnormal movements     Vital signs in last 24 hours:    Temp:  [97.9 °F (36.6 °C)-98.6 °F (37 °C)] 97.9 °F (36.6 °C)  HR:  [71-78] 78  Resp:  [16] 16  BP: (129-138)/(63-64) 138/64    Laboratory results: I have personally  reviewed all pertinent laboratory/tests results.  Most Recent Labs:   Lab Results   Component Value Date    WBC 4.42 02/01/2024    RBC 4.88 02/01/2024    HGB 15.3 02/01/2024    HCT 44.4 02/01/2024     02/01/2024    RDW 11.9 02/01/2024    NEUTROABS 2.87 02/01/2024    SODIUM 141 02/07/2024    K 4.0 02/07/2024     02/07/2024    CO2 31 02/07/2024    BUN 20 02/07/2024    CREATININE 1.10 02/07/2024    GLUC 79 02/07/2024    GLUF 79 02/07/2024    CALCIUM 9.4 02/07/2024    AST 12 (L) 02/07/2024    ALT 9 02/07/2024    ALKPHOS 37 02/07/2024    TP 5.9 (L) 02/07/2024    ALB 4.0 02/07/2024    TBILI 0.43 02/07/2024    [id number] 0.968 02/01/2024       Progress Toward Goals: improving slowly    Assessment/Plan the patient overall endorses mild anxiety and depression.   Overall doing much better.  Struggles with sleep.  Mirtazapine as needed has been effective . does not endorse any hallucination or paranoia or delusion.  The plan is to continue with current medication with no changes.  We will continue to monitor the patient for his mood, behavior, safety, sleep and response to meds while he is here in the unit  Principal Problem:    Major depressive disorder, recurrent episode (HCC)  Active Problems:    Medical clearance for psychiatric admission    Marijuana abuse    Serum total bilirubin elevated    Recommended Treatment:     Planned medication and treatment changes:    All current active medications have been reviewed  Encourage group therapy, milieu therapy and occupational therapy  Behavioral Health checks every 7 minutes  Continue current medications:  Current Facility-Administered Medications   Medication Dose Route Frequency Provider Last Rate    acetaminophen  650 mg Oral Q6H PRN Zakia Resendez, DO      acetaminophen  650 mg Oral Q4H PRN Zakia Resendez, DO      acetaminophen  975 mg Oral Q6H PRN Zakia Resendez DO      aluminum-magnesium hydroxide-simethicone  30 mL Oral Q4H PRN Zakia Resendez DO       haloperidol lactate  2.5 mg Intramuscular Q4H PRN Max 4/day Zakia Resendez, DO      And    LORazepam  1 mg Intramuscular Q4H PRN Max 4/day Zakia Resendez, DO      And    benztropine  0.5 mg Intramuscular Q4H PRN Max 4/day Zakia Mal, DO      haloperidol lactate  5 mg Intramuscular Q4H PRN Max 4/day Zakia Resendez, DO      And    LORazepam  2 mg Intramuscular Q4H PRN Max 4/day Zakia Resendez, DO      And    benztropine  1 mg Intramuscular Q4H PRN Max 4/day Zakia Mal, DO      benztropine  1 mg Intramuscular Q4H PRN Max 6/day Zakia Resendez, DO      benztropine  1 mg Oral Q4H PRN Max 6/day Zakiaflores Resendez, DO      hydrOXYzine HCL  50 mg Oral Q6H PRN Max 4/day Zakia Resendez, DO      Or    diphenhydrAMINE  50 mg Intramuscular Q6H PRN Zakia Resendez, DO      escitalopram  10 mg Oral Daily Gregory Bennett MD      Artificial Tears  1 drop Both Eyes Q3H PRN Zakia Resendez, DO      haloperidol  1 mg Oral Q6H PRN Zakia Resendez, DO      haloperidol  2.5 mg Oral Q4H PRN Max 4/day Zakia Resendez, DO      haloperidol  5 mg Oral Q4H PRN Max 4/day Zakia Resendez, DO      hydrOXYzine HCL  100 mg Oral Q6H PRN Max 4/day Zakia Resendez, DO      Or    LORazepam  2 mg Intramuscular Q6H PRN Zakia Resendez, DO      hydrOXYzine HCL  25 mg Oral Q6H PRN Max 4/day Zakia Resendez, DO      hydrOXYzine HCL  50 mg Oral BID Gregory Bennett MD      melatonin  3 mg Oral HS Gregory Bennett MD      mirtazapine  7.5 mg Oral HS PRN Gregory Bennett MD      polyethylene glycol  17 g Oral Daily PRN Zakia Resendez, DO      propranolol  10 mg Oral Q8H PRN Zakia Resendez, DO      senna-docusate sodium  1 tablet Oral Daily PRN Zakia Resendez, DO         Risks / Benefits of Treatment:    Risks, benefits, and possible side effects of medications explained to patient and patient verbalizes understanding and agreement for treatment.    Counseling / Coordination of Care:    Patient's progress discussed with staff in treatment team  meeting.  Medications, treatment progress and treatment plan reviewed with patient.    Belinda Avelar MD 02/11/24

## 2024-02-11 NOTE — PLAN OF CARE
Problem: Ineffective Coping  Goal: Identifies ineffective coping skills  Outcome: Progressing  Goal: Identifies healthy coping skills  Outcome: Progressing  Goal: Demonstrates healthy coping skills  Outcome: Progressing  Goal: Participates in unit activities  Description: Interventions:  - Provide therapeutic environment   - Provide required programming   - Redirect inappropriate behaviors   Outcome: Progressing  Goal: Patient/Family participate in treatment and DC plans  Description: Interventions:  - Provide therapeutic environment  Outcome: Progressing  Goal: Patient/Family verbalizes awareness of resources  Outcome: Progressing  Goal: Understands least restrictive measures  Description: Interventions:  - Utilize least restrictive behavior  Outcome: Progressing  Goal: Free from restraint events  Description: - Utilize least restrictive measures   - Provide behavioral interventions   - Redirect inappropriate behaviors   Outcome: Progressing     Problem: Depression  Goal: Treatment Goal: Demonstrate behavioral control of depressive symptoms, verbalize feelings of improved mood/affect, and adopt new coping skills prior to discharge  Outcome: Progressing  Goal: Verbalize thoughts and feelings  Description: Interventions:  - Assess and re-assess patient's level of risk   - Engage patient in 1:1 interactions, daily, for a minimum of 15 minutes   - Encourage patient to express feelings, fears, frustrations, hopes   Outcome: Progressing  Goal: Refrain from harming self  Description: Interventions:  - Monitor patient closely, per order   - Supervise medication ingestion, monitor effects and side effects   Outcome: Progressing  Goal: Refrain from isolation  Description: Interventions:  - Develop a trusting relationship   - Encourage socialization   Outcome: Progressing  Goal: Refrain from self-neglect  Outcome: Progressing  Goal: Attend and participate in unit activities, including therapeutic, recreational, and  educational groups  Description: Interventions:  - Provide therapeutic and educational activities daily, encourage attendance and participation, and document same in the medical record   Outcome: Progressing  Goal: Complete daily ADLs, including personal hygiene independently, as able  Description: Interventions:  - Observe, teach, and assist patient with ADLS  -  Monitor and promote a balance of rest/activity, with adequate nutrition and elimination   Outcome: Progressing     Problem: Nutrition/Hydration-ADULT  Goal: Nutrient/Hydration intake appropriate for improving, restoring or maintaining nutritional needs  Description: Monitor and assess patient's nutrition/hydration status for malnutrition. Collaborate with interdisciplinary team and initiate plan and interventions as ordered.  Monitor patient's weight and dietary intake as ordered or per policy. Utilize nutrition screening tool and intervene as necessary. Determine patient's food preferences and provide high-protein, high-caloric foods as appropriate.     INTERVENTIONS:  - Monitor oral intake, urinary output, labs, and treatment plans  - Assess nutrition and hydration status and recommend course of action  - Evaluate amount of meals eaten  - Assist patient with eating if necessary   - Allow adequate time for meals  - Recommend/ encourage appropriate diets, oral nutritional supplements, and vitamin/mineral supplements  - Order, calculate, and assess calorie counts as needed  - Recommend, monitor, and adjust tube feedings and TPN/PPN based on assessed needs  - Assess need for intravenous fluids  - Provide specific nutrition/hydration education as appropriate  - Include patient/family/caregiver in decisions related to nutrition  Outcome: Progressing

## 2024-02-11 NOTE — NURSING NOTE
"Patient was given 100 mg PO PRN of atarx for anxiety.  Patient attributes his severe anxiety to \"talking to his mom on the phone.\" Otherwise he is visible on the unit, quiet and calm.    "

## 2024-02-11 NOTE — NURSING NOTE
Patient remained visible watching TV throughout the evening. Pleasant upon approach. Requested and received Remeron 7.5 mg po prn for sleep at 2109.    At 2210, patient observed resting in bed.

## 2024-02-12 PROCEDURE — 99232 SBSQ HOSP IP/OBS MODERATE 35: CPT | Performed by: STUDENT IN AN ORGANIZED HEALTH CARE EDUCATION/TRAINING PROGRAM

## 2024-02-12 RX ADMIN — HYDROXYZINE HYDROCHLORIDE 50 MG: 50 TABLET, FILM COATED ORAL at 08:24

## 2024-02-12 RX ADMIN — MELATONIN TAB 3 MG 3 MG: 3 TAB at 21:07

## 2024-02-12 RX ADMIN — MIRTAZAPINE 7.5 MG: 7.5 TABLET, FILM COATED ORAL at 21:07

## 2024-02-12 RX ADMIN — HYDROXYZINE HYDROCHLORIDE 50 MG: 50 TABLET, FILM COATED ORAL at 17:37

## 2024-02-12 RX ADMIN — HYDROXYZINE HYDROCHLORIDE 100 MG: 50 TABLET, FILM COATED ORAL at 12:36

## 2024-02-12 RX ADMIN — ESCITALOPRAM OXALATE 10 MG: 10 TABLET ORAL at 08:24

## 2024-02-12 NOTE — PLAN OF CARE
Problem: Ineffective Coping  Goal: Participates in unit activities  Description: Interventions:  - Provide therapeutic environment   - Provide required programming   - Redirect inappropriate behaviors   Outcome: Not Progressing   Patient has had good group attendance but recently he is on the decline.

## 2024-02-12 NOTE — PROGRESS NOTES
02/12/24 0902   Team Meeting   Meeting Type Daily Rounds   Team Members Present   Team Members Present Physician;Nurse;   Physician Team Member Sabrina   Nursing Team Member [facility]   Care Management Team Member Kimberley   Patient/Family Present   Patient Present No   Patient's Family Present No     Pt received PRN Atarax for anxiety. Visible on the unit. Med/meal compliant. Isolative to his room during the evening. PRN Remeron effective for sleep. Discharge possible end of week.

## 2024-02-12 NOTE — PLAN OF CARE
Problem: Ineffective Coping  Goal: Identifies ineffective coping skills  Outcome: Progressing  Goal: Identifies healthy coping skills  Outcome: Progressing  Goal: Demonstrates healthy coping skills  Outcome: Progressing  Goal: Participates in unit activities  Description: Interventions:  - Provide therapeutic environment   - Provide required programming   - Redirect inappropriate behaviors   Outcome: Progressing  Goal: Patient/Family participate in treatment and DC plans  Description: Interventions:  - Provide therapeutic environment  Outcome: Progressing  Goal: Patient/Family verbalizes awareness of resources  Outcome: Progressing  Goal: Understands least restrictive measures  Description: Interventions:  - Utilize least restrictive behavior  Outcome: Progressing  Goal: Free from restraint events  Description: - Utilize least restrictive measures   - Provide behavioral interventions   - Redirect inappropriate behaviors   Outcome: Progressing     Problem: Depression  Goal: Treatment Goal: Demonstrate behavioral control of depressive symptoms, verbalize feelings of improved mood/affect, and adopt new coping skills prior to discharge  Outcome: Progressing  Goal: Verbalize thoughts and feelings  Description: Interventions:  - Assess and re-assess patient's level of risk   - Engage patient in 1:1 interactions, daily, for a minimum of 15 minutes   - Encourage patient to express feelings, fears, frustrations, hopes   Outcome: Progressing  Goal: Refrain from harming self  Description: Interventions:  - Monitor patient closely, per order   - Supervise medication ingestion, monitor effects and side effects   Outcome: Progressing  Goal: Refrain from isolation  Description: Interventions:  - Develop a trusting relationship   - Encourage socialization   Outcome: Progressing  Goal: Refrain from self-neglect  Outcome: Progressing  Goal: Attend and participate in unit activities, including therapeutic, recreational, and  educational groups  Description: Interventions:  - Provide therapeutic and educational activities daily, encourage attendance and participation, and document same in the medical record   Outcome: Progressing  Goal: Complete daily ADLs, including personal hygiene independently, as able  Description: Interventions:  - Observe, teach, and assist patient with ADLS  -  Monitor and promote a balance of rest/activity, with adequate nutrition and elimination   Outcome: Progressing     Problem: Anxiety  Goal: Anxiety is at manageable level  Description: Interventions:  - Assess and monitor patient's anxiety level.   - Monitor for signs and symptoms (heart palpitations, chest pain, shortness of breath, headaches, nausea, feeling jumpy, restlessness, irritable, apprehensive).   - Collaborate with interdisciplinary team and initiate plan and interventions as ordered.  - [place] patient to unit/surroundings  - Explain treatment plan  - Encourage participation in care  - Encourage verbalization of concerns/fears  - Identify coping mechanisms  - Assist in developing anxiety-reducing skills  - Administer/offer alternative therapies  - Limit or eliminate stimulants  Outcome: Progressing

## 2024-02-12 NOTE — NURSING NOTE
"Pt states he has paper work to fill out but that his mother needs to fill out a portion of it as well and he is not sure how to get it to her. Informed Pt that  would help him with paper work and that his mother can come to the lobby and staff will bring it down for her to complete.Pt states \" that wont happen tonight but maybe tomorrow\".  "

## 2024-02-12 NOTE — PROGRESS NOTES
Progress Note - Behavioral Health   Shaquille Ackerman 28 y.o. male MRN: [id number]  Unit/Bed#: U 346-02 Encounter: [id number]    Assessment/Plan   Principal Problem:    Major depressive disorder, recurrent episode (HCC)  Active Problems:    Medical clearance for psychiatric admission    Marijuana abuse    Serum total bilirubin elevated      Treatment Plan:   No psychopharmacological changes indicated at this time, continue with current regimen outlined below:    Lexapro 10 mg daily for depression and anxiety   Hydroxyzine 50 mg BID for anxiety   Melatonin 3 mg HS for sleep    Continue with group therapy, milieu therapy and occupational therapy.    Obtain collateral information as indicated  Continue frequent safety checks and vitals per unit protocol.  Case discussed with treatment team.  Risks, benefits and possible side effects of Medications: Risks, benefits, and possible side effects of medications have been explained to the patient, who verbalizes understanding      Legal status: 201    Disposition: discharge pending, coordinating with case management      ~~~~~~~~~~~~~~~~~~~~~~~~~~~~~~~~~~~~~~~~~~    Subjective: Per nursing report, Shaquille has been cooperative on the unit and compliant with scheduled medications. Over the past 24 hours, Shaquille required Remeron 7.5 mg for sleep at 2102 which was effective. When seen, Shaquille requested PRN Atarax for his anxiety. He was noted to be more isolative in his room since the weekend.    He reports worsened mood and anxiety after speaking with his mother over the weekend because she told him he could not stay with her upon discharge. He was preoccupied with the concern of utilizing services in [facility] if he goes to stay with his father. He states that he has slept more than normal over the past 24 hours and that the Remeron is somewhat helpful for sleep. He states he has lessened appetite since speaking to his mother. He denies any changes in energy.     Today, Shaquille  denies active or passive suicidal or homicidal ideations. He denies any visual or auditory hallucinations.     Progress Toward Goals: unchanged    Psychiatric Review of Systems:  Behavior over the last 24 hours:  anxiety worsened  Sleep:  increased  Appetite: decreased from baseline  Medication side effects: none verbalized  ROS: Complete review of systems is negative except as noted above.    Vital signs in last 24 hours:  Temp:  [97.6 °F (36.4 °C)-98.1 °F (36.7 °C)] 97.6 °F (36.4 °C)  HR:  [87] 87  Resp:  [16-18] 18  BP: (109-130)/(61-62) 109/61    Mental Status Exam:  Appearance:  alert, disheveled , red curly hair , and intermittent eye contact   Behavior:  limited cooperativity and preoccupation with discharge planning    Motor: no abnormal movements, restless and fidgety, normal gait and balance, and pacing around room   Speech:  spontaneous, normal volume, and coherent   Mood:  anxious and irritable   Affect:  mood-congruent   Thought Process:  circumstantial and preoccupation   Thought Content: no overt delusions or paranoid, no current active or passive suicidal ideations, and no current active or passive homicidal ideations    Perceptual disturbances: no reported hallucinations and does not appear to be responding to internal stimuli at this time   Cognition: oriented to self and situation, appears to be of average intelligence, and cognition not formally tested   Insight:  Improving   Judgment: Improving     Current Medications:  Current Facility-Administered Medications   Medication Dose Route Frequency Provider Last Rate    acetaminophen  650 mg Oral Q6H PRN Zakia Resendez DO      acetaminophen  650 mg Oral Q4H PRN Zakia Resendez DO      acetaminophen  975 mg Oral Q6H PRN Zakia Resendez DO      aluminum-magnesium hydroxide-simethicone  30 mL Oral Q4H PRN Zakia Resendez DO      haloperidol lactate  2.5 mg Intramuscular Q4H PRN Max 4/day Zakia Resendez DO      And    LORazepam  1 mg Intramuscular Q4H  PRN Max 4/day Zakia Mal, DO      And    benztropine  0.5 mg Intramuscular Q4H PRN Max 4/day Zakia Mal, DO      haloperidol lactate  5 mg Intramuscular Q4H PRN Max 4/day Zakia Mal, DO      And    LORazepam  2 mg Intramuscular Q4H PRN Max 4/day Zakia Mal, DO      And    benztropine  1 mg Intramuscular Q4H PRN Max 4/day Zakia Mal, DO      benztropine  1 mg Intramuscular Q4H PRN Max 6/day Zakia Mal, DO      benztropine  1 mg Oral Q4H PRN Max 6/day Zakia Mal, DO      hydrOXYzine HCL  50 mg Oral Q6H PRN Max 4/day Zakia Mal, DO      Or    diphenhydrAMINE  50 mg Intramuscular Q6H PRN Zakia Mal, DO      escitalopram  10 mg Oral Daily Gregory Bennett MD      Artificial Tears  1 drop Both Eyes Q3H PRN Zakia Mal, DO      haloperidol  1 mg Oral Q6H PRN Zakia Mal, DO      haloperidol  2.5 mg Oral Q4H PRN Max 4/day Zakia Mal, DO      haloperidol  5 mg Oral Q4H PRN Max 4/day Zakia Mal, DO      hydrOXYzine HCL  100 mg Oral Q6H PRN Max 4/day Zakia Mal, DO      Or    LORazepam  2 mg Intramuscular Q6H PRN Zakia Mal, DO      hydrOXYzine HCL  25 mg Oral Q6H PRN Max 4/day Zakia Mal, DO      hydrOXYzine HCL  50 mg Oral BID Gregory Bennett MD      melatonin  3 mg Oral HS Gregory Bennett MD      mirtazapine  7.5 mg Oral HS PRN Gregory Bennett MD      polyethylene glycol  17 g Oral Daily PRN Zakia Mal, DO      propranolol  10 mg Oral Q8H PRN Zakia Mal, DO      senna-docusate sodium  1 tablet Oral Daily PRN Zakia Mal, DO         Behavioral Health Medications: all current active meds have been reviewed. Changes listed in treatment plan section above.    Laboratory results:  I have personally reviewed all pertinent laboratory/tests results.  No results found for this or any previous visit (from the past 48 hour(s)).     Emily Menendez

## 2024-02-12 NOTE — NURSING NOTE
Pt c/o severe anxiety. 1236 PRN Hydroxyzine 100 mg PO given. Pt continues to refuse to eat meals. Pt states that he is onlg going to drink ensure. 1336 Pt observed sitting in group in the dayroom. Pt appears more relaxed.

## 2024-02-12 NOTE — NURSING NOTE
Patient remained isolative to room throughout the evening. Cooperative with routine. Denied any unmet needs. HS medication compliant. Remeron prn requested and received at 2102.    At 2202, patient observed resting in bed with his eyes closed.

## 2024-02-12 NOTE — NURSING NOTE
Pt denies SI/HI/AH/VH. Pt isolative to room and self. Medication compliant. Pt refused Breakfast. Pt reports anxiety and depression but does not want a PRN at this time. Pt reports not sleeping well last night.  Advised Pt to discus with Dr and informed Pt I would leave a note for psychiatrist. No further concerns as of present. Plan of care ongoing.

## 2024-02-13 PROCEDURE — 99232 SBSQ HOSP IP/OBS MODERATE 35: CPT | Performed by: STUDENT IN AN ORGANIZED HEALTH CARE EDUCATION/TRAINING PROGRAM

## 2024-02-13 RX ADMIN — HYDROXYZINE HYDROCHLORIDE 50 MG: 50 TABLET, FILM COATED ORAL at 08:40

## 2024-02-13 RX ADMIN — ESCITALOPRAM OXALATE 10 MG: 10 TABLET ORAL at 08:40

## 2024-02-13 RX ADMIN — HYDROXYZINE HYDROCHLORIDE 75 MG: 25 TABLET ORAL at 16:05

## 2024-02-13 RX ADMIN — HYDROXYZINE HYDROCHLORIDE 75 MG: 25 TABLET ORAL at 21:45

## 2024-02-13 RX ADMIN — MIRTAZAPINE 7.5 MG: 7.5 TABLET, FILM COATED ORAL at 21:48

## 2024-02-13 RX ADMIN — MELATONIN TAB 3 MG 3 MG: 3 TAB at 21:45

## 2024-02-13 NOTE — NURSING NOTE
Copy of [place] state license and social security card sent to [phone] as requested by pt for insurance purposes.

## 2024-02-13 NOTE — PROGRESS NOTES
"Progress Note - Behavioral Health   Shaquille Ackerman 28 y.o. male MRN: [id number]  Unit/Bed#: [facility] 346-02 Encounter: [id number]      REQUIRED DOCUMENTATION:     1. This service was provided via Telemedicine.  2. Provider located at home office.  3. TeleMed provider: Gregory Bennett MD.  4. Identify all parties in room with patient during tele consult:  None  5.Patient was then informed that this was a Telemedicine visit and that the exam was being conducted confidentially over secure lines. My office door was closed. No one else was in the room.  Patient acknowledged consent and understanding of privacy and security of the Telemedicine visit, and gave us permission to have the assistant stay in the room in order to assist with the history and to conduct the exam.  I informed the patient that I have reviewed their record in Epic and presented the opportunity for them to ask any questions regarding the visit today.  The patient agreed to participate.       Subjective:   Per nursing report, patient has been anxious about dispo. He required PRN atarax for anxiety. He has been medication compliant. No acute behavioral issues overnight. Patient is doing better today. He reports that his mood is improved. He also found sleep to be improved after he had a more positive conversation with his mother. He is still anxious about his living situation and family support. He denies any SI, HI, or AVH. He is agreeable to titration of Atarax.    Behavior over the last 24 hours:  improved  Sleep: improved  Appetite: normal  Medication side effects: No  ROS: no complaints and all other systems are negative    Mental Status Evaluation:  Appearance:  disheveled and marginal hygiene   Behavior:  calm, pleasant, and cooperative   Speech:  normal rate and volume   Mood:  \"better\"   Affect:  constricted   Thought Process:  linear and goal directed   Associations: concrete associations   Thought Content:  no overt delusions   Perceptual " Disturbances: denies auditory or visual hallucinations when asked, does not appear responding to internal stimuli   Risk Potential: Suicidal ideation - None  Homicidal ideation - None  Potential for aggression - Not at present   Sensorium:  oriented to person, place, and time/date   Memory:  recent and remote memory grossly intact   Consciousness:  alert and awake   Attention/Concentration: attention span and concentration are age appropriate   Insight:  limited   Judgment: limited   Gait/Station: normal gait/station, normal balance   Motor Activity: no abnormal movements     Medications: all current active meds have been reviewed.  Current Facility-Administered Medications   Medication Dose Route Frequency Provider Last Rate    acetaminophen  650 mg Oral Q6H PRN Zakia Mal, DO      acetaminophen  650 mg Oral Q4H PRN Zakia Mal, DO      acetaminophen  975 mg Oral Q6H PRN Zakia Mal, DO      aluminum-magnesium hydroxide-simethicone  30 mL Oral Q4H PRN Zakia Resendez, DO      haloperidol lactate  2.5 mg Intramuscular Q4H PRN Max 4/day Zakia Resendez, DO      And    LORazepam  1 mg Intramuscular Q4H PRN Max 4/day Zakia Resendez, DO      And    benztropine  0.5 mg Intramuscular Q4H PRN Max 4/day Zakia Mal, DO      haloperidol lactate  5 mg Intramuscular Q4H PRN Max 4/day Zakia Resendez, DO      And    LORazepam  2 mg Intramuscular Q4H PRN Max 4/day Zakia Resendez, DO      And    benztropine  1 mg Intramuscular Q4H PRN Max 4/day Zakia Mal, DO      benztropine  1 mg Intramuscular Q4H PRN Max 6/day Zakia Resendez, DO      benztropine  1 mg Oral Q4H PRN Max 6/day Zakia Mal, DO      hydrOXYzine HCL  50 mg Oral Q6H PRN Max 4/day Zakia Resendez, DO      Or    diphenhydrAMINE  50 mg Intramuscular Q6H PRN Zakia Resendez, DO      escitalopram  10 mg Oral Daily Gregory Bennett MD      Artificial Tears  1 drop Both Eyes Q3H PRN Zakia Resendez, DO      haloperidol  1 mg Oral Q6H PRN Zakia Resendez,  DO      haloperidol  2.5 mg Oral Q4H PRN Max 4/day Zakia Resendez, DO      haloperidol  5 mg Oral Q4H PRN Max 4/day Zakia Resendez, DO      hydrOXYzine HCL  100 mg Oral Q6H PRN Max 4/day Zakia Resendez,       Or    LORazepam  2 mg Intramuscular Q6H PRN Zakia Resendez, DO      hydrOXYzine HCL  25 mg Oral Q6H PRN Max 4/day Zakia Resendez, DO      hydrOXYzine HCL  50 mg Oral BID Gregory Bennett MD      melatonin  3 mg Oral HS Gregory Bennett MD      mirtazapine  7.5 mg Oral HS PRN Gregory Bennett MD      polyethylene glycol  17 g Oral Daily PRN Zakia Resendez, DO      propranolol  10 mg Oral Q8H PRN Zakia Resendez, DO      senna-docusate sodium  1 tablet Oral Daily PRN Zakia Resendez, DO         Labs: I have personally reviewed all pertinent laboratory/tests results  Most Recent Labs:   Lab Results   Component Value Date    WBC 4.42 02/01/2024    RBC 4.88 02/01/2024    HGB 15.3 02/01/2024    HCT 44.4 02/01/2024     02/01/2024    RDW 11.9 02/01/2024    NEUTROABS 2.87 02/01/2024    SODIUM 141 02/07/2024    K 4.0 02/07/2024     02/07/2024    CO2 31 02/07/2024    BUN 20 02/07/2024    CREATININE 1.10 02/07/2024    GLUC 79 02/07/2024    CALCIUM 9.4 02/07/2024    AST 12 (L) 02/07/2024    ALT 9 02/07/2024    ALKPHOS 37 02/07/2024    TP 5.9 (L) 02/07/2024    ALB 4.0 02/07/2024    TBILI 0.43 02/07/2024    NIY6SPKDXMRC 0.968 02/01/2024         Assessment/Plan   Principal Problem:    Major depressive disorder, recurrent episode (HCC)  Active Problems:    Medical clearance for psychiatric admission    Marijuana abuse    Serum total bilirubin elevated    Recommended Treatment:   Continue Lexapro 10 mg daily.  Increase Atarax to 75 mg TID.  Continue melatonin 3 mg QHS.  Continue all other medications at current doses as above.  Encourage group therapy, milieu therapy and occupational therapy  All current active medications have been reviewed  Encourage group therapy, milieu therapy and occupational  therapy  Behavioral Health checks every 7 minutes    ----------------------------------------    Progress Toward Goals: progressing    Risks / Benefits of Treatment:    Risks, benefits, and possible side effects of medications explained to patient and patient verbalizes understanding and agreement for treatment.    Counseling / Coordination of Care:    Patient's progress discussed with staff in treatment team meeting.  Medications, treatment progress and treatment plan reviewed with patient.    Gregory Bennett MD 02/13/24

## 2024-02-13 NOTE — NURSING NOTE
Patient visible on the unit throughout the evening. Cooperative with routine. Denied any unmet needs. HS medication compliant. Requested and received Remeron 7.5 mg po prn at 2107.    At 2207, patient observed resting in bed.

## 2024-02-13 NOTE — NURSING NOTE
Pt remained visible this evening. Social with select peers. Pt appears paranoid/anxious at times. Medication and meal compliant. No further concerns as of present. Plan of care ongoing.

## 2024-02-13 NOTE — PROGRESS NOTES
02/13/24 0936   Team Meeting   Meeting Type Daily Rounds   Team Members Present   Team Members Present Physician;Nurse;   Physician Team Member Sabrina   [facility]   Care Management Team Member Kimberley   Patient/Family Present   Patient Present No   Patient's Family Present No     Pt med/meal compliant. Visible on the unit. Anxious r/t discharge dispo.

## 2024-02-13 NOTE — PLAN OF CARE
Problem: Ineffective Coping  Goal: Identifies ineffective coping skills  Outcome: Progressing  Goal: Identifies healthy coping skills  Outcome: Progressing  Goal: Demonstrates healthy coping skills  Outcome: Progressing  Goal: Participates in unit activities  Description: Interventions:  - Provide therapeutic environment   - Provide required programming   - Redirect inappropriate behaviors   Outcome: Progressing  Goal: Patient/Family participate in treatment and DC plans  Description: Interventions:  - Provide therapeutic environment  Outcome: Progressing  Goal: Patient/Family verbalizes awareness of resources  Outcome: Progressing  Goal: Understands least restrictive measures  Description: Interventions:  - Utilize least restrictive behavior  Outcome: Progressing  Goal: Free from restraint events  Description: - Utilize least restrictive measures   - Provide behavioral interventions   - Redirect inappropriate behaviors   Outcome: Progressing     Problem: Depression  Goal: Treatment Goal: Demonstrate behavioral control of depressive symptoms, verbalize feelings of improved mood/affect, and adopt new coping skills prior to discharge  Outcome: Progressing  Goal: Verbalize thoughts and feelings  Description: Interventions:  - Assess and re-assess patient's level of risk   - Engage patient in 1:1 interactions, daily, for a minimum of 15 minutes   - Encourage patient to express feelings, fears, frustrations, hopes   Outcome: Progressing  Goal: Refrain from harming self  Description: Interventions:  - Monitor patient closely, per order   - Supervise medication ingestion, monitor effects and side effects   Outcome: Progressing  Goal: Refrain from isolation  Description: Interventions:  - Develop a trusting relationship   - Encourage socialization   Outcome: Progressing  Goal: Refrain from self-neglect  Outcome: Progressing  Goal: Attend and participate in unit activities, including therapeutic, recreational, and  educational groups  Description: Interventions:  - Provide therapeutic and educational activities daily, encourage attendance and participation, and document same in the medical record   Outcome: Progressing  Goal: Complete daily ADLs, including personal hygiene independently, as able  Description: Interventions:  - Observe, teach, and assist patient with ADLS  -  Monitor and promote a balance of rest/activity, with adequate nutrition and elimination   Outcome: Progressing     Problem: Anxiety  Goal: Anxiety is at manageable level  Description: Interventions:  - Assess and monitor patient's anxiety level.   - Monitor for signs and symptoms (heart palpitations, chest pain, shortness of breath, headaches, nausea, feeling jumpy, restlessness, irritable, apprehensive).   - Collaborate with interdisciplinary team and initiate plan and interventions as ordered.  - [place] patient to unit/surroundings  - Explain treatment plan  - Encourage participation in care  - Encourage verbalization of concerns/fears  - Identify coping mechanisms  - Assist in developing anxiety-reducing skills  - Administer/offer alternative therapies  - Limit or eliminate stimulants  Outcome: Progressing     Problem: DISCHARGE PLANNING  Goal: Discharge to home or other facility with appropriate resources  Description: INTERVENTIONS:  - Identify barriers to discharge w/patient and caregiver  - Arrange for needed discharge resources and transportation as appropriate  - Identify discharge learning needs (meds, wound care, etc.)  - Arrange for interpretive services to assist at discharge as needed  - Refer to Case Management Department for coordinating discharge planning if the patient needs post-hospital services based on physician/advanced practitioner order or complex needs related to functional status, cognitive ability, or social support system  Outcome: Progressing     Problem: Nutrition/Hydration-ADULT  Goal: Nutrient/Hydration intake appropriate  for improving, restoring or maintaining nutritional needs  Description: Monitor and assess patient's nutrition/hydration status for malnutrition. Collaborate with interdisciplinary team and initiate plan and interventions as ordered.  Monitor patient's weight and dietary intake as ordered or per policy. Utilize nutrition screening tool and intervene as necessary. Determine patient's food preferences and provide high-protein, high-caloric foods as appropriate.     INTERVENTIONS:  - Monitor oral intake, urinary output, labs, and treatment plans  - Assess nutrition and hydration status and recommend course of action  - Evaluate amount of meals eaten  - Assist patient with eating if necessary   - Allow adequate time for meals  - Recommend/ encourage appropriate diets, oral nutritional supplements, and vitamin/mineral supplements  - Order, calculate, and assess calorie counts as needed  - Recommend, monitor, and adjust tube feedings and TPN/PPN based on assessed needs  - Assess need for intravenous fluids  - Provide specific nutrition/hydration education as appropriate  - Include patient/family/caregiver in decisions related to nutrition  Outcome: Progressing

## 2024-02-13 NOTE — NURSING NOTE
Patient is calm and cooperative upon approach. Patient is visible at times on unit. Patient denies SI/HI/AH/VH. Patient is compliant with meds and meals

## 2024-02-14 PROCEDURE — 99232 SBSQ HOSP IP/OBS MODERATE 35: CPT | Performed by: STUDENT IN AN ORGANIZED HEALTH CARE EDUCATION/TRAINING PROGRAM

## 2024-02-14 RX ADMIN — ESCITALOPRAM OXALATE 10 MG: 10 TABLET ORAL at 08:07

## 2024-02-14 RX ADMIN — MELATONIN TAB 3 MG 3 MG: 3 TAB at 21:06

## 2024-02-14 RX ADMIN — MIRTAZAPINE 7.5 MG: 7.5 TABLET, FILM COATED ORAL at 21:06

## 2024-02-14 RX ADMIN — HYDROXYZINE HYDROCHLORIDE 75 MG: 25 TABLET ORAL at 16:25

## 2024-02-14 RX ADMIN — HYDROXYZINE HYDROCHLORIDE 75 MG: 25 TABLET ORAL at 08:07

## 2024-02-14 RX ADMIN — HYDROXYZINE HYDROCHLORIDE 75 MG: 25 TABLET ORAL at 21:06

## 2024-02-14 NOTE — PLAN OF CARE
Problem: Ineffective Coping  Goal: Demonstrates healthy coping skills  Outcome: Progressing  Goal: Participates in unit activities  Description: Interventions:  - Provide therapeutic environment   - Provide required programming   - Redirect inappropriate behaviors   Outcome: Progressing   Patient is active in group therapy and addressing his issues.

## 2024-02-14 NOTE — NURSING NOTE
During med pass pt requested Remeron for insomnia at 2148, Pt given PRN Remeron 7.5mg for Insomnia, Will reassess and monitor    Pt reassessed at 2248  and pt was sleeping in his room, PRN Remeron was effective

## 2024-02-14 NOTE — NURSING NOTE
Pt calm and cooperative, Pt pleasant upon approach, Pt visible on the unit seen in the dayroom and pacing the unit at times, Pt took meds in applesauce,  Pt denied all pain anxiety and depression, Pt denies HI and SI , Pt denied AH and VH, Pt took all scheduled HS meds, Q7 min safety checks maintained

## 2024-02-14 NOTE — PROGRESS NOTES
Progress Note - Behavioral Health   Shaquille Ackerman 28 y.o. male MRN: [id number]  Unit/Bed#: [facility] 346-02 Encounter: [id number]    Assessment/Plan   Principal Problem:    Major depressive disorder, recurrent episode (HCC)  Active Problems:    Medical clearance for psychiatric admission    Marijuana abuse    Serum total bilirubin elevated      Treatment Plan:   No psychopharmacological changes indicated at this time, continue with current regimen outlined below:    Lexapro 10 mg daily for depression and anxiety   Hydroxyzine 75 mg three times daily for anxiety   Melatonin 3 mg for sleep     Continue with group therapy, milieu therapy and occupational therapy.    Obtain collateral information as indicated  Continue frequent safety checks and vitals per unit protocol.  Case discussed with treatment team.  Risks, benefits and possible side effects of Medications: Risks, benefits, and possible side effects of medications have been explained to the patient, who verbalizes understanding      Legal status: 201    Disposition: discharge pending, coordinating with case management       ~~~~~~~~~~~~~~~~~~~~~~~~~~~~~~~~~~~~~~~~~~    Subjective: Per nursing report, Shaquille has been cooperative on the unit and compliant with scheduled medications. Over the past 24 hours, Shaquille required use of Remeron 7.5 mg for sleep which was somewhat effective.     Shaquille was seen and evaluated for continuity of care. He states that his sleep has not been good and that he has been waking up frequently despite the Remeron. He states that his mood is frustrated. He states that his appetite and energy have been adequate. He is still concerned about where he will live after discharge since his mother is not picking up his phone calls.     Today, Shaquille denies active or passive suicidal or homicidal ideations. He denies any visual or auditory hallucinations.     Progress Toward Goals:  improvement    Psychiatric Review of Systems:  Behavior over the last 24  hours: improved  Sleep: frequent awakenings  Appetite: adequate  Medication side effects: none verbalized  ROS: Complete review of systems is negative except as noted above.    Vital signs in last 24 hours:  Temp:  [96.4 °F (35.8 °C)-98.4 °F (36.9 °C)] 98.4 °F (36.9 °C)  HR:  [75-81] 75  Resp:  [16] 16  BP: (117-137)/(63-72) 137/63    Mental Status Exam:  Appearance:  alert, disheveled , red curly hair , intermittent eye contact, and thin    Behavior:  calm and cooperative   Motor: no abnormal movements and normal gait and balance   Speech:  spontaneous, normal rate, normal volume, and coherent   Mood:  anxious and irritable   Affect:  mood-congruent   Thought Process:  organized, goal-directed, and linear   Thought Content: no overt delusions or paranoid, no current active or passive suicidal ideations, and no current active or passive homicidal ideations    Perceptual disturbances: no reported hallucinations and does not appear to be responding to internal stimuli at this time   Cognition: oriented to self and situation, appears to be of average intelligence, and cognition not formally tested   Insight:  Improving   Judgment: Improving     Current Medications:  Current Facility-Administered Medications   Medication Dose Route Frequency Provider Last Rate    acetaminophen  650 mg Oral Q6H PRN Zakia Mal, DO      acetaminophen  650 mg Oral Q4H PRN Zakia Mal, DO      acetaminophen  975 mg Oral Q6H PRN Zakia Mal, DO      aluminum-magnesium hydroxide-simethicone  30 mL Oral Q4H PRN Zakia Resendez, DO      haloperidol lactate  2.5 mg Intramuscular Q4H PRN Max 4/day Zakia Resendez DO      And    LORazepam  1 mg Intramuscular Q4H PRN Max 4/day Zakia Resendez DO      And    benztropine  0.5 mg Intramuscular Q4H PRN Max 4/day Zakia Resendez, DO      haloperidol lactate  5 mg Intramuscular Q4H PRN Max 4/day Zakia Resendez DO      And    LORazepam  2 mg Intramuscular Q4H PRN Max 4/day Zakia Mal, DO       And    benztropine  1 mg Intramuscular Q4H PRN Max 4/day Zakia Mal, DO      benztropine  1 mg Intramuscular Q4H PRN Max 6/day Zakia Mal, DO      benztropine  1 mg Oral Q4H PRN Max 6/day Zakia Mal, DO      hydrOXYzine HCL  50 mg Oral Q6H PRN Max 4/day Zakia Mal, DO      Or    diphenhydrAMINE  50 mg Intramuscular Q6H PRN Zakia Mal, DO      escitalopram  10 mg Oral Daily Gregory Bennett MD      Artificial Tears  1 drop Both Eyes Q3H PRN Zakia Mal, DO      haloperidol  1 mg Oral Q6H PRN Zakia Mal, DO      haloperidol  2.5 mg Oral Q4H PRN Max 4/day Zakia Mal, DO      haloperidol  5 mg Oral Q4H PRN Max 4/day Zakia Mal, DO      hydrOXYzine HCL  100 mg Oral Q6H PRN Max 4/day Zakia Mal, DO      Or    LORazepam  2 mg Intramuscular Q6H PRN Zakia Mal, DO      hydrOXYzine HCL  25 mg Oral Q6H PRN Max 4/day Zkaia Mal, DO      hydrOXYzine HCL  75 mg Oral TID Gregory Bennett MD      melatonin  3 mg Oral HS Gregory Bennett MD      mirtazapine  7.5 mg Oral HS PRN Gregory Bennett MD      polyethylene glycol  17 g Oral Daily PRN Zakia Resendez, DO      propranolol  10 mg Oral Q8H PRN Zakia Mal, DO      senna-docusate sodium  1 tablet Oral Daily PRN Zakia Resendez, DO         Behavioral Health Medications: all current active meds have been reviewed. Changes listed in treatment plan section above.    Laboratory results:  I have personally reviewed all pertinent laboratory/tests results.  No results found for this or any previous visit (from the past 48 hour(s)).     Emily Menendez

## 2024-02-14 NOTE — NURSING NOTE
Pt denies SI/HI/AH/VH.  Present in dayroom and milieu. Social with select peers. Medication and meal compliant. Pt preoccupied with paper work and discharge.  Pt has irritable edge and states he is feeling frustrated with how things are going.  Pt preoccupied with his Mother calling regarding the paper he needs to fax. Pt appeared less irritable this afternoon. Pt still appears anxious/paranoid while in the milieu. No further concerns as of present.  Plan of care ongoing.

## 2024-02-14 NOTE — PROGRESS NOTES
02/14/24 1009   Team Meeting   Meeting Type Daily Rounds   Team Members Present   Team Members Present Physician;Nurse;   Physician Team Member Sabrina   [facility]   Care Management Team Member Dawood   Patient/Family Present   Patient Present No   Patient's Family Present No     Pt is visible on unit and social with select peers. Received PRN Remeron for insomnia; it was effective. Pt is med/meal compliant. Discharge TBD

## 2024-02-14 NOTE — PLAN OF CARE
Problem: Ineffective Coping  Goal: Identifies ineffective coping skills  Outcome: Progressing  Goal: Identifies healthy coping skills  Outcome: Progressing  Goal: Demonstrates healthy coping skills  Outcome: Progressing  Goal: Participates in unit activities  Description: Interventions:  - Provide therapeutic environment   - Provide required programming   - Redirect inappropriate behaviors   Outcome: Progressing  Goal: Patient/Family participate in treatment and DC plans  Description: Interventions:  - Provide therapeutic environment  Outcome: Progressing  Goal: Patient/Family verbalizes awareness of resources  Outcome: Progressing  Goal: Understands least restrictive measures  Description: Interventions:  - Utilize least restrictive behavior  Outcome: Progressing  Goal: Free from restraint events  Description: - Utilize least restrictive measures   - Provide behavioral interventions   - Redirect inappropriate behaviors   Outcome: Progressing     Problem: Depression  Goal: Treatment Goal: Demonstrate behavioral control of depressive symptoms, verbalize feelings of improved mood/affect, and adopt new coping skills prior to discharge  Outcome: Progressing  Goal: Verbalize thoughts and feelings  Description: Interventions:  - Assess and re-assess patient's level of risk   - Engage patient in 1:1 interactions, daily, for a minimum of 15 minutes   - Encourage patient to express feelings, fears, frustrations, hopes   Outcome: Progressing  Goal: Refrain from harming self  Description: Interventions:  - Monitor patient closely, per order   - Supervise medication ingestion, monitor effects and side effects   Outcome: Progressing  Goal: Refrain from isolation  Description: Interventions:  - Develop a trusting relationship   - Encourage socialization   Outcome: Progressing  Goal: Refrain from self-neglect  Outcome: Progressing  Goal: Attend and participate in unit activities, including therapeutic, recreational, and  educational groups  Description: Interventions:  - Provide therapeutic and educational activities daily, encourage attendance and participation, and document same in the medical record   Outcome: Progressing  Goal: Complete daily ADLs, including personal hygiene independently, as able  Description: Interventions:  - Observe, teach, and assist patient with ADLS  -  Monitor and promote a balance of rest/activity, with adequate nutrition and elimination   Outcome: Progressing     Problem: Anxiety  Goal: Anxiety is at manageable level  Description: Interventions:  - Assess and monitor patient's anxiety level.   - Monitor for signs and symptoms (heart palpitations, chest pain, shortness of breath, headaches, nausea, feeling jumpy, restlessness, irritable, apprehensive).   - Collaborate with interdisciplinary team and initiate plan and interventions as ordered.  - [place] patient to unit/surroundings  - Explain treatment plan  - Encourage participation in care  - Encourage verbalization of concerns/fears  - Identify coping mechanisms  - Assist in developing anxiety-reducing skills  - Administer/offer alternative therapies  - Limit or eliminate stimulants  Outcome: Progressing     Problem: Nutrition/Hydration-ADULT  Goal: Nutrient/Hydration intake appropriate for improving, restoring or maintaining nutritional needs  Description: Monitor and assess patient's nutrition/hydration status for malnutrition. Collaborate with interdisciplinary team and initiate plan and interventions as ordered.  Monitor patient's weight and dietary intake as ordered or per policy. Utilize nutrition screening tool and intervene as necessary. Determine patient's food preferences and provide high-protein, high-caloric foods as appropriate.     INTERVENTIONS:  - Monitor oral intake, urinary output, labs, and treatment plans  - Assess nutrition and hydration status and recommend course of action  - Evaluate amount of meals eaten  - Assist patient with  eating if necessary   - Allow adequate time for meals  - Recommend/ encourage appropriate diets, oral nutritional supplements, and vitamin/mineral supplements  - Order, calculate, and assess calorie counts as needed  - Recommend, monitor, and adjust tube feedings and TPN/PPN based on assessed needs  - Assess need for intravenous fluids  - Provide specific nutrition/hydration education as appropriate  - Include patient/family/caregiver in decisions related to nutrition  Outcome: Progressing     Problem: DISCHARGE PLANNING  Goal: Discharge to home or other facility with appropriate resources  Description: INTERVENTIONS:  - Identify barriers to discharge w/patient and caregiver  - Arrange for needed discharge resources and transportation as appropriate  - Identify discharge learning needs (meds, wound care, etc.)  - Arrange for interpretive services to assist at discharge as needed  - Refer to Case Management Department for coordinating discharge planning if the patient needs post-hospital services based on physician/advanced practitioner order or complex needs related to functional status, cognitive ability, or social support system  Outcome: Progressing

## 2024-02-15 PROCEDURE — 99232 SBSQ HOSP IP/OBS MODERATE 35: CPT | Performed by: STUDENT IN AN ORGANIZED HEALTH CARE EDUCATION/TRAINING PROGRAM

## 2024-02-15 RX ADMIN — ESCITALOPRAM OXALATE 10 MG: 10 TABLET ORAL at 08:10

## 2024-02-15 RX ADMIN — MELATONIN TAB 3 MG 3 MG: 3 TAB at 21:07

## 2024-02-15 RX ADMIN — MIRTAZAPINE 7.5 MG: 7.5 TABLET, FILM COATED ORAL at 21:07

## 2024-02-15 RX ADMIN — HYDROXYZINE HYDROCHLORIDE 75 MG: 25 TABLET ORAL at 15:38

## 2024-02-15 RX ADMIN — HYDROXYZINE HYDROCHLORIDE 100 MG: 50 TABLET, FILM COATED ORAL at 11:54

## 2024-02-15 RX ADMIN — HYDROXYZINE HYDROCHLORIDE 75 MG: 25 TABLET ORAL at 21:07

## 2024-02-15 RX ADMIN — HYDROXYZINE HYDROCHLORIDE 75 MG: 25 TABLET ORAL at 08:10

## 2024-02-15 NOTE — PROGRESS NOTES
02/15/24 1000   Activity/Group Checklist   Group Other (Comment)  (Group Art Therapy/Psychodynamic, Creatively Exploring Relationship with Quotes with Discussion)   Attendance Attended   Attendance Duration (min) Greater than 60  (90 min group)   Interactions Interacted appropriately   Affect/Mood Appropriate   Goals Achieved Discussed coping strategies;Able to listen to others;Able to engage in interactions;Able to self-disclose;Able to recieve feedback

## 2024-02-15 NOTE — PROGRESS NOTES
"Progress Note - Behavioral Health   Shaquille Ackerman 28 y.o. male MRN: [id number]  Unit/Bed#: -02 Encounter: [id number]    Assessment/Plan   Principal Problem:    Major depressive disorder, recurrent episode (HCC)  Active Problems:    Medical clearance for psychiatric admission    Marijuana abuse    Serum total bilirubin elevated      Treatment Plan:   No psychopharmacological changes indicated at this time, continue with current regimen outlined below:    Continue Lexapro 10 mg daily for depression and anxiety   Continue Atarax 75 mg TID for anxiety   Continue Melatonin 3 mg HS for sleep    Continue with group therapy, milieu therapy and occupational therapy.    Obtain collateral information as indicated  Continue frequent safety checks and vitals per unit protocol.  Case discussed with treatment team.  Risks, benefits and possible side effects of Medications: Risks, benefits, and possible side effects of medications have been explained to the patient, who verbalizes understanding      Legal status: 201    Disposition: discharge pending, coordinating with case management      ~~~~~~~~~~~~~~~~~~~~~~~~~~~~~~~~~~~~~~~~~~    Subjective: Per nursing report, Shaquille has been cooperative on the unit and compliant with scheduled medications. Over the past 24 hours, Shaquille required Remeron 7.5 mg for sleep. Towards the end of the interview today, Shaquille requested something for his anxiety due to the discussion surrounding his mother not returning his calls, and Atarax was given.     Shaquille was seen and evaluated for continuity of care. He reports his mood as \"fine, except the fact that my mom doesn't want talk to me.\" He reports poor sleep, adequate appetite, and baseline energy. Patient met with [facility]  yesterday to discuss resources. Patient states that this went well and was helpful. Patient believes he is going to need services in [facility] since that is where his dad lives.      Today, Shaquille " denies active or passive suicidal or homicidal ideations. He denies any visual or auditory hallucinations.       Progress Toward Goals:  improvement    Psychiatric Review of Systems:  Behavior over the last 24 hours: unchanged  Sleep: insomnia  Appetite: adequate  Medication side effects: none verbalized  ROS: Complete review of systems is negative except as noted above.    Vital signs in last 24 hours:  Temp:  [97.3 °F (36.3 °C)-97.5 °F (36.4 °C)] 97.5 °F (36.4 °C)  HR:  [67-70] 70  Resp:  [16] 16  BP: (115-130)/(60-67) 130/60    Mental Status Exam:  Appearance:  alert, red curly hair , improved eye contact , and thin    Behavior:  calm and cooperative   Motor: no abnormal movements and normal gait and balance   Speech:  spontaneous, normal rate, normal volume, and coherent   Mood:  anxious   Affect:  constricted and anxious   Thought Process:  organized, goal-directed, and linear   Thought Content: no overt delusions or paranoid, no current active or passive suicidal ideations, and no current active or passive homicidal ideations    Perceptual disturbances: no reported hallucinations and does not appear to be responding to internal stimuli at this time   Cognition: oriented to self and situation, appears to be of average intelligence, and cognition not formally tested   Insight:  Improving   Judgment: Improving     Current Medications:  Current Facility-Administered Medications   Medication Dose Route Frequency Provider Last Rate    acetaminophen  650 mg Oral Q6H PRN Zakia Resendez DO      acetaminophen  650 mg Oral Q4H PRN Zakia Resendez DO      acetaminophen  975 mg Oral Q6H PRN Zakia Resendez DO      aluminum-magnesium hydroxide-simethicone  30 mL Oral Q4H PRN Zakia Resendez DO      haloperidol lactate  2.5 mg Intramuscular Q4H PRN Max 4/day Zakia Resendez DO      And    LORazepam  1 mg Intramuscular Q4H PRN Max 4/day Zakia Resendez DO      And    benztropine  0.5 mg Intramuscular Q4H PRN Max 4/day  Zakia Mal, DO      haloperidol lactate  5 mg Intramuscular Q4H PRN Max 4/day Zakia Mal, DO      And    LORazepam  2 mg Intramuscular Q4H PRN Max 4/day Zakia Mal, DO      And    benztropine  1 mg Intramuscular Q4H PRN Max 4/day Zakia Mal, DO      benztropine  1 mg Intramuscular Q4H PRN Max 6/day Zakia Mal, DO      benztropine  1 mg Oral Q4H PRN Max 6/day Zakia Mal, DO      hydrOXYzine HCL  50 mg Oral Q6H PRN Max 4/day Zakia Mal, DO      Or    diphenhydrAMINE  50 mg Intramuscular Q6H PRN Zakia Mal, DO      escitalopram  10 mg Oral Daily Gregory Bennett MD      Artificial Tears  1 drop Both Eyes Q3H PRN Zakia Mal, DO      haloperidol  1 mg Oral Q6H PRN Zakia Mal, DO      haloperidol  2.5 mg Oral Q4H PRN Max 4/day Zakia Mal, DO      haloperidol  5 mg Oral Q4H PRN Max 4/day Zakia Mal, DO      hydrOXYzine HCL  100 mg Oral Q6H PRN Max 4/day Zakia Mal, DO      Or    LORazepam  2 mg Intramuscular Q6H PRN Zakia Mal, DO      hydrOXYzine HCL  25 mg Oral Q6H PRN Max 4/day Zakia Mal, DO      hydrOXYzine HCL  75 mg Oral TID Gregory Bennett MD      melatonin  3 mg Oral HS Gregory Bennett MD      mirtazapine  7.5 mg Oral HS PRN Gregory Bennett MD      polyethylene glycol  17 g Oral Daily PRN Zakia Mal, DO      propranolol  10 mg Oral Q8H PRN Zakia Mal, DO      senna-docusate sodium  1 tablet Oral Daily PRN Zakia Resendez, DO         Behavioral Health Medications: all current active meds have been reviewed. Changes listed in treatment plan section above.    Laboratory results:  I have personally reviewed all pertinent laboratory/tests results.  No results found for this or any previous visit (from the past 48 hour(s)).     Emily Menendez

## 2024-02-15 NOTE — PLAN OF CARE
Problem: Ineffective Coping  Goal: Identifies ineffective coping skills  Outcome: Progressing  Goal: Identifies healthy coping skills  Outcome: Progressing  Goal: Demonstrates healthy coping skills  Outcome: Progressing  Goal: Participates in unit activities  Description: Interventions:  - Provide therapeutic environment   - Provide required programming   - Redirect inappropriate behaviors   Outcome: Progressing  Goal: Patient/Family participate in treatment and DC plans  Description: Interventions:  - Provide therapeutic environment  Outcome: Progressing  Goal: Patient/Family verbalizes awareness of resources  Outcome: Progressing  Goal: Understands least restrictive measures  Description: Interventions:  - Utilize least restrictive behavior  Outcome: Progressing  Goal: Free from restraint events  Description: - Utilize least restrictive measures   - Provide behavioral interventions   - Redirect inappropriate behaviors   Outcome: Progressing     Problem: Depression  Goal: Treatment Goal: Demonstrate behavioral control of depressive symptoms, verbalize feelings of improved mood/affect, and adopt new coping skills prior to discharge  Outcome: Progressing  Goal: Verbalize thoughts and feelings  Description: Interventions:  - Assess and re-assess patient's level of risk   - Engage patient in 1:1 interactions, daily, for a minimum of 15 minutes   - Encourage patient to express feelings, fears, frustrations, hopes   Outcome: Progressing  Goal: Refrain from harming self  Description: Interventions:  - Monitor patient closely, per order   - Supervise medication ingestion, monitor effects and side effects   Outcome: Progressing  Goal: Refrain from isolation  Description: Interventions:  - Develop a trusting relationship   - Encourage socialization   Outcome: Progressing  Goal: Refrain from self-neglect  Outcome: Progressing  Goal: Attend and participate in unit activities, including therapeutic, recreational, and  educational groups  Description: Interventions:  - Provide therapeutic and educational activities daily, encourage attendance and participation, and document same in the medical record   Outcome: Progressing  Goal: Complete daily ADLs, including personal hygiene independently, as able  Description: Interventions:  - Observe, teach, and assist patient with ADLS  -  Monitor and promote a balance of rest/activity, with adequate nutrition and elimination   Outcome: Progressing     Problem: Anxiety  Goal: Anxiety is at manageable level  Description: Interventions:  - Assess and monitor patient's anxiety level.   - Monitor for signs and symptoms (heart palpitations, chest pain, shortness of breath, headaches, nausea, feeling jumpy, restlessness, irritable, apprehensive).   - Collaborate with interdisciplinary team and initiate plan and interventions as ordered.  - [place] patient to unit/surroundings  - Explain treatment plan  - Encourage participation in care  - Encourage verbalization of concerns/fears  - Identify coping mechanisms  - Assist in developing anxiety-reducing skills  - Administer/offer alternative therapies  - Limit or eliminate stimulants  Outcome: Progressing     Problem: Nutrition/Hydration-ADULT  Goal: Nutrient/Hydration intake appropriate for improving, restoring or maintaining nutritional needs  Description: Monitor and assess patient's nutrition/hydration status for malnutrition. Collaborate with interdisciplinary team and initiate plan and interventions as ordered.  Monitor patient's weight and dietary intake as ordered or per policy. Utilize nutrition screening tool and intervene as necessary. Determine patient's food preferences and provide high-protein, high-caloric foods as appropriate.     INTERVENTIONS:  - Monitor oral intake, urinary output, labs, and treatment plans  - Assess nutrition and hydration status and recommend course of action  - Evaluate amount of meals eaten  - Assist patient with  eating if necessary   - Allow adequate time for meals  - Recommend/ encourage appropriate diets, oral nutritional supplements, and vitamin/mineral supplements  - Order, calculate, and assess calorie counts as needed  - Recommend, monitor, and adjust tube feedings and TPN/PPN based on assessed needs  - Assess need for intravenous fluids  - Provide specific nutrition/hydration education as appropriate  - Include patient/family/caregiver in decisions related to nutrition  Outcome: Progressing

## 2024-02-15 NOTE — NURSING NOTE
Pt visible on unit walking in halls. Denies symptoms, needs and concerns. Compliant with unit routines and care. Safety checks continue.

## 2024-02-15 NOTE — NURSING NOTE
Pt awake, visible in the milieu, appears anxious and paranoid. Pt denies SI/HI/AVH at this time. Reports poor sleep last night. Pt compliant with scheduled medications, taking in applesauce. Meal compliant. Social with select peers. Remains in behavioral control. Denies any unmet needs. Q7 minute safety checks maintained.

## 2024-02-15 NOTE — PROGRESS NOTES
02/15/24 1606   Team Meeting   Meeting Type Daily Rounds   Team Members Present   Team Members Present Physician;Nurse;   Physician Team Member Sabrina   Nursing Team Member [place]   Care Management Team Member Javad   Patient/Family Present   Patient Present No   Patient's Family Present No     Patient currently holds 201 status. Patient denies all symptoms. Patient is medication and meal compliant. Patient to continue on all current scheduled medications. Patient discharge date and time is to be determined.

## 2024-02-16 PROCEDURE — 99232 SBSQ HOSP IP/OBS MODERATE 35: CPT | Performed by: STUDENT IN AN ORGANIZED HEALTH CARE EDUCATION/TRAINING PROGRAM

## 2024-02-16 RX ADMIN — MELATONIN TAB 3 MG 3 MG: 3 TAB at 21:27

## 2024-02-16 RX ADMIN — MIRTAZAPINE 7.5 MG: 7.5 TABLET, FILM COATED ORAL at 21:27

## 2024-02-16 RX ADMIN — HYDROXYZINE HYDROCHLORIDE 75 MG: 25 TABLET ORAL at 08:13

## 2024-02-16 RX ADMIN — HYDROXYZINE HYDROCHLORIDE 75 MG: 25 TABLET ORAL at 21:27

## 2024-02-16 RX ADMIN — ESCITALOPRAM OXALATE 10 MG: 10 TABLET ORAL at 08:13

## 2024-02-16 RX ADMIN — HYDROXYZINE HYDROCHLORIDE 75 MG: 25 TABLET ORAL at 15:48

## 2024-02-16 NOTE — PROGRESS NOTES
"Progress Note - Behavioral Health   Shaquille Ackerman 28 y.o. male MRN: [id number]  Unit/Bed#: U 346-02 Encounter: [id number]    Behavior over the last 24 hours:  unchanged.   Sleep: insomnia (given remeron prn)  Appetite: normal  Medication side effects: No  ROS: no complaints    Mental Status Evaluation:  Appearance:  disheveled and in hospital gown   Behavior:  guarded   Speech:  normal pitch and normal volume   Mood:  \"Ok\"   Affect:  constricted   Thought Process:  goal directed and logical   Thought Content:  No overt delusions   Perceptual Disturbances: None   Risk Potential: Suicidal Ideations none and Homicidal Ideations none   Sensorium:  person, place, and situation   Cognition:  recent and remote memory grossly intact   Consciousness:  alert and awake    Attention: attention span and concentration were age appropriate   Insight:  limited   Judgment: limited   Gait/Station: In bed   Motor Activity: no abnormal movements       Progress Toward Goals: Per RN report he is homeless, reporting anxiety (atarax has been effective) and received PRN remeron last night.     He is seen in his room reading a science fiction book, he reports the lexapro is not helping but he finds the atarax is effective. He unsure about where he may be living at the time of dc.  T/C increase lexapro to 15mg 2/18    Assessment/Plan   Principal Problem:    Major depressive disorder, recurrent episode (HCC)  Active Problems:    Medical clearance for psychiatric admission    Marijuana abuse    Serum total bilirubin elevated      Recommended Treatment: Continue with group therapy, milieu therapy and occupational therapy.      Risks, benefits and possible side effects of Medications:   Risks, benefits, and possible side effects of medications explained to patient and patient verbalizes understanding.      Medications:       all current active meds have been reviewed and current meds:   Current Facility-Administered Medications   Medication Dose " Route Frequency    acetaminophen (TYLENOL) tablet 650 mg  650 mg Oral Q6H PRN    acetaminophen (TYLENOL) tablet 650 mg  650 mg Oral Q4H PRN    acetaminophen (TYLENOL) tablet 975 mg  975 mg Oral Q6H PRN    aluminum-magnesium hydroxide-simethicone (MAALOX) oral suspension 30 mL  30 mL Oral Q4H PRN    haloperidol lactate (HALDOL) injection 2.5 mg  2.5 mg Intramuscular Q4H PRN Max 4/day    And    LORazepam (ATIVAN) injection 1 mg  1 mg Intramuscular Q4H PRN Max 4/day    And    benztropine (COGENTIN) injection 0.5 mg  0.5 mg Intramuscular Q4H PRN Max 4/day    haloperidol lactate (HALDOL) injection 5 mg  5 mg Intramuscular Q4H PRN Max 4/day    And    LORazepam (ATIVAN) injection 2 mg  2 mg Intramuscular Q4H PRN Max 4/day    And    benztropine (COGENTIN) injection 1 mg  1 mg Intramuscular Q4H PRN Max 4/day    benztropine (COGENTIN) injection 1 mg  1 mg Intramuscular Q4H PRN Max 6/day    benztropine (COGENTIN) tablet 1 mg  1 mg Oral Q4H PRN Max 6/day    hydrOXYzine HCL (ATARAX) tablet 50 mg  50 mg Oral Q6H PRN Max 4/day    Or    diphenhydrAMINE (BENADRYL) injection 50 mg  50 mg Intramuscular Q6H PRN    escitalopram (LEXAPRO) tablet 10 mg  10 mg Oral Daily    glycerin-hypromellose- (ARTIFICIAL TEARS) ophthalmic solution 1 drop  1 drop Both Eyes Q3H PRN    haloperidol (HALDOL) tablet 1 mg  1 mg Oral Q6H PRN    haloperidol (HALDOL) tablet 2.5 mg  2.5 mg Oral Q4H PRN Max 4/day    haloperidol (HALDOL) tablet 5 mg  5 mg Oral Q4H PRN Max 4/day    hydrOXYzine HCL (ATARAX) tablet 100 mg  100 mg Oral Q6H PRN Max 4/day    Or    LORazepam (ATIVAN) injection 2 mg  2 mg Intramuscular Q6H PRN    hydrOXYzine HCL (ATARAX) tablet 25 mg  25 mg Oral Q6H PRN Max 4/day    hydrOXYzine HCL (ATARAX) tablet 75 mg  75 mg Oral TID    melatonin tablet 3 mg  3 mg Oral HS    mirtazapine (REMERON) tablet 7.5 mg  7.5 mg Oral HS PRN    polyethylene glycol (MIRALAX) packet 17 g  17 g Oral Daily PRN    propranolol (INDERAL) tablet 10 mg  10 mg Oral Q8H  PRN    senna-docusate sodium (SENOKOT S) 8.6-50 mg per tablet 1 tablet  1 tablet Oral Daily PRN   .    Labs:  Reviewed  Admission on 01/31/2024   Component Date Value    Sodium 02/01/2024 139     Potassium 02/01/2024 3.9     Chloride 02/01/2024 105     CO2 02/01/2024 25     ANION GAP 02/01/2024 9     BUN 02/01/2024 16     Creatinine 02/01/2024 1.12     Glucose 02/01/2024 77     Glucose, Fasting 02/01/2024 77     Calcium 02/01/2024 9.2     AST 02/01/2024 26     ALT 02/01/2024 12     Alkaline Phosphatase 02/01/2024 43     Total Protein 02/01/2024 6.6     Albumin 02/01/2024 4.5     Total Bilirubin 02/01/2024 1.47 (H)     eGFR 02/01/2024 87     WBC 02/01/2024 4.42     RBC 02/01/2024 4.88     Hemoglobin 02/01/2024 15.3     Hematocrit 02/01/2024 44.4     MCV 02/01/2024 91     MCH 02/01/2024 31.4     MCHC 02/01/2024 34.5     RDW 02/01/2024 11.9     MPV 02/01/2024 9.7     Platelets 02/01/2024 177     nRBC 02/01/2024 0     Neutrophils Relative 02/01/2024 65     Immat GRANS % 02/01/2024 0     Lymphocytes Relative 02/01/2024 26     Monocytes Relative 02/01/2024 8     Eosinophils Relative 02/01/2024 0     Basophils Relative 02/01/2024 1     Neutrophils Absolute 02/01/2024 2.87     Immature Grans Absolute 02/01/2024 0.01     Lymphocytes Absolute 02/01/2024 1.14     Monocytes Absolute 02/01/2024 0.37     Eosinophils Absolute 02/01/2024 0.01     Basophils Absolute 02/01/2024 0.02     Vitamin B-12 02/01/2024 720     Vit D, 1,25-Dihydroxy 02/01/2024 45.9     TSH 3RD GENERATON 02/01/2024 0.968     Ventricular Rate 02/01/2024 64     Atrial Rate 02/01/2024 64     NM Interval 02/01/2024 148     QRSD Interval 02/01/2024 94     QT Interval 02/01/2024 402     QTC Interval 02/01/2024 414     P Axis 02/01/2024 57     QRS Artie 02/01/2024 19     T Wave Axis 02/01/2024 19     Sodium 02/03/2024 141     Potassium 02/03/2024 4.0     Chloride 02/03/2024 107     CO2 02/03/2024 27     ANION GAP 02/03/2024 7     BUN 02/03/2024 15     Creatinine  02/03/2024 1.17     Glucose 02/03/2024 83     Glucose, Fasting 02/03/2024 83     Calcium 02/03/2024 9.2     AST 02/03/2024 17     ALT 02/03/2024 10     Alkaline Phosphatase 02/03/2024 42     Total Protein 02/03/2024 6.4     Albumin 02/03/2024 4.6     Total Bilirubin 02/03/2024 1.31 (H)     eGFR 02/03/2024 83     Sodium 02/07/2024 141     Potassium 02/07/2024 4.0     Chloride 02/07/2024 107     CO2 02/07/2024 31     ANION GAP 02/07/2024 3     BUN 02/07/2024 20     Creatinine 02/07/2024 1.10     Glucose 02/07/2024 79     Glucose, Fasting 02/07/2024 79     Calcium 02/07/2024 9.4     AST 02/07/2024 12 (L)     ALT 02/07/2024 9     Alkaline Phosphatase 02/07/2024 37     Total Protein 02/07/2024 5.9 (L)     Albumin 02/07/2024 4.0     Total Bilirubin 02/07/2024 0.43     eGFR 02/07/2024 89        Counseling / Coordination of Care  Total floor / unit time spent today 20 minutes. Greater than 50% of total time was spent with the patient and / or family counseling and / or coordination of care. A description of the counseling / coordination of care: medications, treatment progress and treatment plan reviewed with patient.

## 2024-02-16 NOTE — NURSING NOTE
Pt visible on unit and social in dayroom/walking in halls. Appears anxious at times but denies all symptoms on assessment. Compliant with unit routines and care. No pain reported. Safety checks continue.

## 2024-02-16 NOTE — PROGRESS NOTES
"Progress Note - Behavioral Health   Shaquille Ackerman 28 y.o. male MRN: [id number]  Unit/Bed#: [facility] 346-02 Encounter: [id number]    Subjective:   Per nursing report, patient has been calm and cooperative on the unit.  He remains medication compliant.  He did require as needed Atarax for anxiety yesterday.  No acute behavioral episodes overnight. Patient is constricted but generally pleasant and appropriate during interview.  He reports that he has been struggling with calling his father about possibly staying with him.  He reports that he does generally like going to groups, though does not care to attend art groups.  He denies any SI, HI, or AVH.  Denies problems tolerating medications.    Behavior over the last 24 hours:  unchanged  Sleep: normal  Appetite: normal  Medication side effects: No  ROS: no complaints and all other systems are negative    Mental Status Evaluation:  Appearance:  disheveled and marginal hygiene   Behavior:  calm, pleasant, cooperative, and guarded   Speech:  normal rate and volume   Mood:  \"alright\"   Affect:  constricted   Thought Process:  linear and goal directed   Associations: intact associations   Thought Content:  no overt delusions   Perceptual Disturbances: denies auditory or visual hallucinations when asked, does not appear responding to internal stimuli   Risk Potential: Suicidal ideation - None  Homicidal ideation - None  Potential for aggression - Not at present   Sensorium:  oriented to person, place, and time/date   Memory:  recent and remote memory grossly intact   Consciousness:  alert and awake   Attention/Concentration: attention span and concentration are age appropriate   Insight:  limited   Judgment: limited   Gait/Station: normal gait/station, normal balance   Motor Activity: no abnormal movements     Medications: all current active meds have been reviewed.  Current Facility-Administered Medications   Medication Dose Route Frequency Provider Last Rate    acetaminophen  650 " mg Oral Q6H PRN Zakia Mal, DO      acetaminophen  650 mg Oral Q4H PRN Zakia Resendez, DO      acetaminophen  975 mg Oral Q6H PRN Zakia Mal, DO      aluminum-magnesium hydroxide-simethicone  30 mL Oral Q4H PRN Zakia Mal, DO      haloperidol lactate  2.5 mg Intramuscular Q4H PRN Max 4/day Zakiaflores Resendez, DO      And    LORazepam  1 mg Intramuscular Q4H PRN Max 4/day Zakia Mal, DO      And    benztropine  0.5 mg Intramuscular Q4H PRN Max 4/day Zakia Mal, DO      haloperidol lactate  5 mg Intramuscular Q4H PRN Max 4/day Zakiaflores Resendez, DO      And    LORazepam  2 mg Intramuscular Q4H PRN Max 4/day Zakiaflores Resendez, DO      And    benztropine  1 mg Intramuscular Q4H PRN Max 4/day Zakiaflores Resendez, DO      benztropine  1 mg Intramuscular Q4H PRN Max 6/day Zakia Resendez, DO      benztropine  1 mg Oral Q4H PRN Max 6/day Zakiaflores Resendez, DO      hydrOXYzine HCL  50 mg Oral Q6H PRN Max 4/day Zakia Resendez, DO      Or    diphenhydrAMINE  50 mg Intramuscular Q6H PRN Zakia Resendez, DO      escitalopram  10 mg Oral Daily Gregory Bennett MD      Artificial Tears  1 drop Both Eyes Q3H PRN Zakia Resendez, DO      haloperidol  1 mg Oral Q6H PRN Zakia Resendez, DO      haloperidol  2.5 mg Oral Q4H PRN Max 4/day Zakia Resendez, DO      haloperidol  5 mg Oral Q4H PRN Max 4/day Zakiaflores Resendez, DO      hydrOXYzine HCL  100 mg Oral Q6H PRN Max 4/day Zakia Resendez, DO      Or    LORazepam  2 mg Intramuscular Q6H PRN Zakia Resendez, DO      hydrOXYzine HCL  25 mg Oral Q6H PRN Max 4/day Zakiaflores Resendez, DO      hydrOXYzine HCL  75 mg Oral TID Gregory Bennett MD      melatonin  3 mg Oral HS Gregory Bennett MD      mirtazapine  7.5 mg Oral HS PRN Gregory Bennett MD      polyethylene glycol  17 g Oral Daily PRN Zakia Resendez,       propranolol  10 mg Oral Q8H PRN Zakia Resendez,       senna-docusate sodium  1 tablet Oral Daily PRN Zakia Resendez,          Labs: I have personally reviewed all  pertinent laboratory/tests results  Most Recent Labs:   Lab Results   Component Value Date    WBC 4.42 02/01/2024    RBC 4.88 02/01/2024    HGB 15.3 02/01/2024    HCT 44.4 02/01/2024     02/01/2024    RDW 11.9 02/01/2024    NEUTROABS 2.87 02/01/2024    SODIUM 141 02/07/2024    K 4.0 02/07/2024     02/07/2024    CO2 31 02/07/2024    BUN 20 02/07/2024    CREATININE 1.10 02/07/2024    GLUC 79 02/07/2024    CALCIUM 9.4 02/07/2024    AST 12 (L) 02/07/2024    ALT 9 02/07/2024    ALKPHOS 37 02/07/2024    TP 5.9 (L) 02/07/2024    ALB 4.0 02/07/2024    TBILI 0.43 02/07/2024    [id number] 0.968 02/01/2024         Assessment/Plan   Principal Problem:    Major depressive disorder, recurrent episode (HCC)  Active Problems:    Medical clearance for psychiatric admission    Marijuana abuse    Serum total bilirubin elevated    Recommended Treatment:   Continue Lexapro 10 mg daily.    Continue Atarax 25 mg 3 times daily.  Continue melatonin 3 mg nightly.  Continue all other medications at current doses as above.  Encourage group therapy, milieu therapy and occupational therapy  All current active medications have been reviewed  Encourage group therapy, milieu therapy and occupational therapy  Behavioral Health checks every 7 minutes    ----------------------------------------    Progress Toward Goals: progressing    Risks / Benefits of Treatment:    Risks, benefits, and possible side effects of medications explained to patient and patient verbalizes understanding and agreement for treatment.    Counseling / Coordination of Care:    Patient's progress discussed with staff in treatment team meeting.  Medications, treatment progress and treatment plan reviewed with patient.    Gregory Bennett MD 02/16/24

## 2024-02-16 NOTE — PLAN OF CARE
Problem: Ineffective Coping  Goal: Identifies ineffective coping skills  Outcome: Progressing  Goal: Identifies healthy coping skills  Outcome: Progressing  Goal: Demonstrates healthy coping skills  Outcome: Progressing  Goal: Participates in unit activities  Description: Interventions:  - Provide therapeutic environment   - Provide required programming   - Redirect inappropriate behaviors   Outcome: Progressing  Goal: Patient/Family participate in treatment and DC plans  Description: Interventions:  - Provide therapeutic environment  Outcome: Progressing  Goal: Patient/Family verbalizes awareness of resources  Outcome: Progressing  Goal: Understands least restrictive measures  Description: Interventions:  - Utilize least restrictive behavior  Outcome: Progressing  Goal: Free from restraint events  Description: - Utilize least restrictive measures   - Provide behavioral interventions   - Redirect inappropriate behaviors   Outcome: Progressing     Problem: Depression  Goal: Treatment Goal: Demonstrate behavioral control of depressive symptoms, verbalize feelings of improved mood/affect, and adopt new coping skills prior to discharge  Outcome: Progressing  Goal: Verbalize thoughts and feelings  Description: Interventions:  - Assess and re-assess patient's level of risk   - Engage patient in 1:1 interactions, daily, for a minimum of 15 minutes   - Encourage patient to express feelings, fears, frustrations, hopes   Outcome: Progressing  Goal: Refrain from harming self  Description: Interventions:  - Monitor patient closely, per order   - Supervise medication ingestion, monitor effects and side effects   Outcome: Progressing  Goal: Refrain from isolation  Description: Interventions:  - Develop a trusting relationship   - Encourage socialization   Outcome: Progressing  Goal: Refrain from self-neglect  Outcome: Progressing  Goal: Attend and participate in unit activities, including therapeutic, recreational, and  educational groups  Description: Interventions:  - Provide therapeutic and educational activities daily, encourage attendance and participation, and document same in the medical record   Outcome: Progressing  Goal: Complete daily ADLs, including personal hygiene independently, as able  Description: Interventions:  - Observe, teach, and assist patient with ADLS  -  Monitor and promote a balance of rest/activity, with adequate nutrition and elimination   Outcome: Progressing     Problem: Anxiety  Goal: Anxiety is at manageable level  Description: Interventions:  - Assess and monitor patient's anxiety level.   - Monitor for signs and symptoms (heart palpitations, chest pain, shortness of breath, headaches, nausea, feeling jumpy, restlessness, irritable, apprehensive).   - Collaborate with interdisciplinary team and initiate plan and interventions as ordered.  - [place] patient to unit/surroundings  - Explain treatment plan  - Encourage participation in care  - Encourage verbalization of concerns/fears  - Identify coping mechanisms  - Assist in developing anxiety-reducing skills  - Administer/offer alternative therapies  - Limit or eliminate stimulants  Outcome: Progressing     Problem: Nutrition/Hydration-ADULT  Goal: Nutrient/Hydration intake appropriate for improving, restoring or maintaining nutritional needs  Description: Monitor and assess patient's nutrition/hydration status for malnutrition. Collaborate with interdisciplinary team and initiate plan and interventions as ordered.  Monitor patient's weight and dietary intake as ordered or per policy. Utilize nutrition screening tool and intervene as necessary. Determine patient's food preferences and provide high-protein, high-caloric foods as appropriate.     INTERVENTIONS:  - Monitor oral intake, urinary output, labs, and treatment plans  - Assess nutrition and hydration status and recommend course of action  - Evaluate amount of meals eaten  - Assist patient with  eating if necessary   - Allow adequate time for meals  - Recommend/ encourage appropriate diets, oral nutritional supplements, and vitamin/mineral supplements  - Order, calculate, and assess calorie counts as needed  - Recommend, monitor, and adjust tube feedings and TPN/PPN based on assessed needs  - Assess need for intravenous fluids  - Provide specific nutrition/hydration education as appropriate  - Include patient/family/caregiver in decisions related to nutrition  Outcome: Progressing

## 2024-02-16 NOTE — NURSING NOTE
Pt awake and alert, cooperative, appears anxious. Denies SI/HI/AVH at this time, denies anxiety or depression. Pt reports he slept better last night. Visible in the milieu and appropriate. Encouraged to attend groups. Pt denies any unmet needs. Remains in behavioral control. Q7 minute safety checks maintained.

## 2024-02-17 PROCEDURE — 99232 SBSQ HOSP IP/OBS MODERATE 35: CPT | Performed by: PSYCHIATRY & NEUROLOGY

## 2024-02-17 RX ADMIN — HYDROXYZINE HYDROCHLORIDE 75 MG: 25 TABLET ORAL at 08:29

## 2024-02-17 RX ADMIN — ESCITALOPRAM OXALATE 10 MG: 10 TABLET ORAL at 08:29

## 2024-02-17 RX ADMIN — MELATONIN TAB 3 MG 3 MG: 3 TAB at 22:30

## 2024-02-17 RX ADMIN — HYDROXYZINE HYDROCHLORIDE 75 MG: 25 TABLET ORAL at 22:30

## 2024-02-17 RX ADMIN — HYDROXYZINE HYDROCHLORIDE 75 MG: 25 TABLET ORAL at 15:24

## 2024-02-17 RX ADMIN — MIRTAZAPINE 7.5 MG: 7.5 TABLET, FILM COATED ORAL at 22:33

## 2024-02-17 NOTE — NURSING NOTE
PT visible in the unit mostly in the Dayroom; calm and cooperative. PT denies SI/HI/VH/AH, no depression and anxiety reported. PRN Remeron 7.5mg given with HS meds at 2127; re-assessed at 2227 with PRN not met effective as PT is still awake at this time.

## 2024-02-17 NOTE — PLAN OF CARE
Problem: Ineffective Coping  Goal: Identifies ineffective coping skills  Outcome: Progressing  Goal: Identifies healthy coping skills  Outcome: Progressing  Goal: Demonstrates healthy coping skills  Outcome: Progressing  Goal: Participates in unit activities  Description: Interventions:  - Provide therapeutic environment   - Provide required programming   - Redirect inappropriate behaviors   Outcome: Progressing  Goal: Patient/Family participate in treatment and DC plans  Description: Interventions:  - Provide therapeutic environment  Outcome: Progressing  Goal: Patient/Family verbalizes awareness of resources  Outcome: Progressing  Goal: Understands least restrictive measures  Description: Interventions:  - Utilize least restrictive behavior  Outcome: Progressing  Goal: Free from restraint events  Description: - Utilize least restrictive measures   - Provide behavioral interventions   - Redirect inappropriate behaviors   Outcome: Progressing     Problem: Depression  Goal: Treatment Goal: Demonstrate behavioral control of depressive symptoms, verbalize feelings of improved mood/affect, and adopt new coping skills prior to discharge  Outcome: Progressing  Goal: Verbalize thoughts and feelings  Description: Interventions:  - Assess and re-assess patient's level of risk   - Engage patient in 1:1 interactions, daily, for a minimum of 15 minutes   - Encourage patient to express feelings, fears, frustrations, hopes   Outcome: Progressing  Goal: Refrain from harming self  Description: Interventions:  - Monitor patient closely, per order   - Supervise medication ingestion, monitor effects and side effects   Outcome: Progressing  Goal: Refrain from isolation  Description: Interventions:  - Develop a trusting relationship   - Encourage socialization   Outcome: Progressing  Goal: Refrain from self-neglect  Outcome: Progressing  Goal: Attend and participate in unit activities, including therapeutic, recreational, and  educational groups  Description: Interventions:  - Provide therapeutic and educational activities daily, encourage attendance and participation, and document same in the medical record   Outcome: Progressing  Goal: Complete daily ADLs, including personal hygiene independently, as able  Description: Interventions:  - Observe, teach, and assist patient with ADLS  -  Monitor and promote a balance of rest/activity, with adequate nutrition and elimination   Outcome: Progressing     Problem: Anxiety  Goal: Anxiety is at manageable level  Description: Interventions:  - Assess and monitor patient's anxiety level.   - Monitor for signs and symptoms (heart palpitations, chest pain, shortness of breath, headaches, nausea, feeling jumpy, restlessness, irritable, apprehensive).   - Collaborate with interdisciplinary team and initiate plan and interventions as ordered.  - [place] patient to unit/surroundings  - Explain treatment plan  - Encourage participation in care  - Encourage verbalization of concerns/fears  - Identify coping mechanisms  - Assist in developing anxiety-reducing skills  - Administer/offer alternative therapies  - Limit or eliminate stimulants  Outcome: Progressing     Problem: Nutrition/Hydration-ADULT  Goal: Nutrient/Hydration intake appropriate for improving, restoring or maintaining nutritional needs  Description: Monitor and assess patient's nutrition/hydration status for malnutrition. Collaborate with interdisciplinary team and initiate plan and interventions as ordered.  Monitor patient's weight and dietary intake as ordered or per policy. Utilize nutrition screening tool and intervene as necessary. Determine patient's food preferences and provide high-protein, high-caloric foods as appropriate.     INTERVENTIONS:  - Monitor oral intake, urinary output, labs, and treatment plans  - Assess nutrition and hydration status and recommend course of action  - Evaluate amount of meals eaten  - Assist patient with  eating if necessary   - Allow adequate time for meals  - Recommend/ encourage appropriate diets, oral nutritional supplements, and vitamin/mineral supplements  - Order, calculate, and assess calorie counts as needed  - Recommend, monitor, and adjust tube feedings and TPN/PPN based on assessed needs  - Assess need for intravenous fluids  - Provide specific nutrition/hydration education as appropriate  - Include patient/family/caregiver in decisions related to nutrition  Outcome: Progressing

## 2024-02-17 NOTE — NURSING NOTE
Pt observed in milieu and dayroom, but prefers resting in bed. Pt is somewhat social with peers, cooperative and pleasant with staff. Pt is wearing clothing of his own. Medication and meal compliant. Currently denies all psych symptoms- no SI/HI/AH/VH, or pain, although pt appears depressed. No PRNs requested. No unmet needs expressed.

## 2024-02-17 NOTE — PROGRESS NOTES
"Progress Note - Behavioral Health   Shaquille Ackerman 28 y.o. male MRN: [id number]  Unit/Bed#: -02 Encounter: [id number]    Behavior over the last 24 hours:  unchanged.   Sleep: normal with help of remeron  Appetite: normal  Medication side effects: No  ROS: no complaints    Mental Status Evaluation:  Appearance:  In hospital gown   Behavior:  guarded   Speech:  normal pitch and normal volume   Mood:  anxious   Affect:  constricted   Thought Process:  goal directed   Thought Content:  normal   Perceptual Disturbances: None   Risk Potential: Suicidal Ideations none and Homicidal Ideations none   Sensorium:  person, place, and situation   Cognition:  recent and remote memory grossly intact   Consciousness:  alert and awake    Attention: attention span and concentration were age appropriate   Insight:  limited   Judgment: fair   Gait/Station: normal gait/station   Motor Activity: no abnormal movements       Progress Toward Goals: Per Rn report he has been receiving remeron for insomnia and he reports being anxious and able to connect the anxiety to having to call his father. He asks what are hallucinations \"it would be interesting to have that, not that I want to have schizophrenia\" but denies all psychotic sx and thoughts of self harm.   Still feels lexapro is not helping but the atarax is.  Reading a new book today that peer lent him.   Wt Readings from Last 3 Encounters:   02/18/24 55.1 kg (121 lb 6.4 oz)   01/30/24 56.4 kg (124 lb 6.4 oz)     Temp Readings from Last 3 Encounters:   02/18/24 (!) 97.1 °F (36.2 °C) (Temporal)   01/30/24 98 °F (36.7 °C) (Oral)     BP Readings from Last 3 Encounters:   02/18/24 132/59   01/31/24 130/72     Pulse Readings from Last 3 Encounters:   02/18/24 62   01/31/24 68         Assessment/Plan   Principal Problem:    Major depressive disorder, recurrent episode (HCC)  Active Problems:    Medical clearance for psychiatric admission    Marijuana abuse    Serum total bilirubin " elevated      Recommended Treatment: Continue with group therapy, milieu therapy and occupational therapy.      Risks, benefits and possible side effects of Medications:   Risks, benefits, and possible side effects of medications explained to patient and patient verbalizes understanding.      Medications:     T/C Increase lexapro to 15mg on 2/19    all current active meds have been reviewed, continue current psychiatric medications, and current meds:   Current Facility-Administered Medications   Medication Dose Route Frequency    acetaminophen (TYLENOL) tablet 650 mg  650 mg Oral Q6H PRN    acetaminophen (TYLENOL) tablet 650 mg  650 mg Oral Q4H PRN    acetaminophen (TYLENOL) tablet 975 mg  975 mg Oral Q6H PRN    aluminum-magnesium hydroxide-simethicone (MAALOX) oral suspension 30 mL  30 mL Oral Q4H PRN    haloperidol lactate (HALDOL) injection 2.5 mg  2.5 mg Intramuscular Q4H PRN Max 4/day    And    LORazepam (ATIVAN) injection 1 mg  1 mg Intramuscular Q4H PRN Max 4/day    And    benztropine (COGENTIN) injection 0.5 mg  0.5 mg Intramuscular Q4H PRN Max 4/day    haloperidol lactate (HALDOL) injection 5 mg  5 mg Intramuscular Q4H PRN Max 4/day    And    LORazepam (ATIVAN) injection 2 mg  2 mg Intramuscular Q4H PRN Max 4/day    And    benztropine (COGENTIN) injection 1 mg  1 mg Intramuscular Q4H PRN Max 4/day    benztropine (COGENTIN) injection 1 mg  1 mg Intramuscular Q4H PRN Max 6/day    benztropine (COGENTIN) tablet 1 mg  1 mg Oral Q4H PRN Max 6/day    hydrOXYzine HCL (ATARAX) tablet 50 mg  50 mg Oral Q6H PRN Max 4/day    Or    diphenhydrAMINE (BENADRYL) injection 50 mg  50 mg Intramuscular Q6H PRN    escitalopram (LEXAPRO) tablet 10 mg  10 mg Oral Daily    glycerin-hypromellose- (ARTIFICIAL TEARS) ophthalmic solution 1 drop  1 drop Both Eyes Q3H PRN    haloperidol (HALDOL) tablet 1 mg  1 mg Oral Q6H PRN    haloperidol (HALDOL) tablet 2.5 mg  2.5 mg Oral Q4H PRN Max 4/day    haloperidol (HALDOL) tablet 5 mg   5 mg Oral Q4H PRN Max 4/day    hydrOXYzine HCL (ATARAX) tablet 100 mg  100 mg Oral Q6H PRN Max 4/day    Or    LORazepam (ATIVAN) injection 2 mg  2 mg Intramuscular Q6H PRN    hydrOXYzine HCL (ATARAX) tablet 25 mg  25 mg Oral Q6H PRN Max 4/day    hydrOXYzine HCL (ATARAX) tablet 75 mg  75 mg Oral TID    melatonin tablet 3 mg  3 mg Oral HS    mirtazapine (REMERON) tablet 7.5 mg  7.5 mg Oral HS PRN    polyethylene glycol (MIRALAX) packet 17 g  17 g Oral Daily PRN    propranolol (INDERAL) tablet 10 mg  10 mg Oral Q8H PRN    senna-docusate sodium (SENOKOT S) 8.6-50 mg per tablet 1 tablet  1 tablet Oral Daily PRN   .    Labs:  Reviewed  Admission on 01/31/2024   Component Date Value    Sodium 02/01/2024 139     Potassium 02/01/2024 3.9     Chloride 02/01/2024 105     CO2 02/01/2024 25     ANION GAP 02/01/2024 9     BUN 02/01/2024 16     Creatinine 02/01/2024 1.12     Glucose 02/01/2024 77     Glucose, Fasting 02/01/2024 77     Calcium 02/01/2024 9.2     AST 02/01/2024 26     ALT 02/01/2024 12     Alkaline Phosphatase 02/01/2024 43     Total Protein 02/01/2024 6.6     Albumin 02/01/2024 4.5     Total Bilirubin 02/01/2024 1.47 (H)     eGFR 02/01/2024 87     WBC 02/01/2024 4.42     RBC 02/01/2024 4.88     Hemoglobin 02/01/2024 15.3     Hematocrit 02/01/2024 44.4     MCV 02/01/2024 91     MCH 02/01/2024 31.4     MCHC 02/01/2024 34.5     RDW 02/01/2024 11.9     MPV 02/01/2024 9.7     Platelets 02/01/2024 177     nRBC 02/01/2024 0     Neutrophils Relative 02/01/2024 65     Immat GRANS % 02/01/2024 0     Lymphocytes Relative 02/01/2024 26     Monocytes Relative 02/01/2024 8     Eosinophils Relative 02/01/2024 0     Basophils Relative 02/01/2024 1     Neutrophils Absolute 02/01/2024 2.87     Immature Grans Absolute 02/01/2024 0.01     Lymphocytes Absolute 02/01/2024 1.14     Monocytes Absolute 02/01/2024 0.37     Eosinophils Absolute 02/01/2024 0.01     Basophils Absolute 02/01/2024 0.02     Vitamin B-12 02/01/2024 720     Vit D,  1,25-Dihydroxy 02/01/2024 45.9     TSH 3RD GENERATON 02/01/2024 0.968     Ventricular Rate 02/01/2024 64     Atrial Rate 02/01/2024 64     TN Interval 02/01/2024 148     QRSD Interval 02/01/2024 94     QT Interval 02/01/2024 402     QTC Interval 02/01/2024 414     P Axis 02/01/2024 57     QRS [place] 02/01/2024 19     T Wave Axis 02/01/2024 19     Sodium 02/03/2024 141     Potassium 02/03/2024 4.0     Chloride 02/03/2024 107     CO2 02/03/2024 27     ANION GAP 02/03/2024 7     BUN 02/03/2024 15     Creatinine 02/03/2024 1.17     Glucose 02/03/2024 83     Glucose, Fasting 02/03/2024 83     Calcium 02/03/2024 9.2     AST 02/03/2024 17     ALT 02/03/2024 10     Alkaline Phosphatase 02/03/2024 42     Total Protein 02/03/2024 6.4     Albumin 02/03/2024 4.6     Total Bilirubin 02/03/2024 1.31 (H)     eGFR 02/03/2024 83     Sodium 02/07/2024 141     Potassium 02/07/2024 4.0     Chloride 02/07/2024 107     CO2 02/07/2024 31     ANION GAP 02/07/2024 3     BUN 02/07/2024 20     Creatinine 02/07/2024 1.10     Glucose 02/07/2024 79     Glucose, Fasting 02/07/2024 79     Calcium 02/07/2024 9.4     AST 02/07/2024 12 (L)     ALT 02/07/2024 9     Alkaline Phosphatase 02/07/2024 37     Total Protein 02/07/2024 5.9 (L)     Albumin 02/07/2024 4.0     Total Bilirubin 02/07/2024 0.43     eGFR 02/07/2024 89        Counseling / Coordination of Care  Total floor / unit time spent today 20 minutes. Greater than 50% of total time was spent with the patient and / or family counseling and / or coordination of care. A description of the counseling / coordination of care: medications, treatment progress and treatment plan reviewed with patient.

## 2024-02-18 PROCEDURE — 99232 SBSQ HOSP IP/OBS MODERATE 35: CPT | Performed by: PSYCHIATRY & NEUROLOGY

## 2024-02-18 RX ADMIN — MELATONIN TAB 3 MG 3 MG: 3 TAB at 22:32

## 2024-02-18 RX ADMIN — ESCITALOPRAM OXALATE 10 MG: 10 TABLET ORAL at 08:46

## 2024-02-18 RX ADMIN — HYDROXYZINE HYDROCHLORIDE 75 MG: 25 TABLET ORAL at 22:31

## 2024-02-18 RX ADMIN — MIRTAZAPINE 7.5 MG: 7.5 TABLET, FILM COATED ORAL at 22:48

## 2024-02-18 RX ADMIN — HYDROXYZINE HYDROCHLORIDE 75 MG: 25 TABLET ORAL at 08:46

## 2024-02-18 RX ADMIN — HYDROXYZINE HYDROCHLORIDE 75 MG: 25 TABLET ORAL at 16:27

## 2024-02-18 NOTE — NURSING NOTE
Patient is in the community. Patient observed watching TV in dayroom with peers. Patient denies all psych symptoms at this time. Patient is compliant with scheduled medications. Staff maintained continuous rounding for safety and support.

## 2024-02-18 NOTE — PLAN OF CARE
Problem: Ineffective Coping  Goal: Participates in unit activities  Description: Interventions:  - Provide therapeutic environment   - Provide required programming   - Redirect inappropriate behaviors   Outcome: Progressing     Problem: Ineffective Coping  Goal: Identifies ineffective coping skills  Outcome: Not Progressing  Goal: Identifies healthy coping skills  Outcome: Not Progressing  Goal: Demonstrates healthy coping skills  Outcome: Not Progressing  Goal: Patient/Family verbalizes awareness of resources  Outcome: Not Progressing

## 2024-02-18 NOTE — NURSING NOTE
Patient c/o difficulty sleeping. PRN remeron 7.5mg was administered at 2233. Will monitor for effectiveness.

## 2024-02-18 NOTE — NURSING NOTE
Patient's visible in the dayroom social with peers. Patient observed watching tv, playing cards with peers. Denies all but appears depressed. Wearing hospital attire. Compliant with scheduled medications. Q 7 min safety checks maintained. Nursing encouragement offered, given. Skin assessment complete. No c/o pain, discomfort, anxiety, sob reported.

## 2024-02-18 NOTE — NURSING NOTE
has been awake, alert, and visible intermittently out in the milieu.Denies any depression, anxiety, a/v hallucinations.patient denies any unmeet needs at this time.

## 2024-02-19 PROCEDURE — 99232 SBSQ HOSP IP/OBS MODERATE 35: CPT | Performed by: STUDENT IN AN ORGANIZED HEALTH CARE EDUCATION/TRAINING PROGRAM

## 2024-02-19 RX ADMIN — HYDROXYZINE HYDROCHLORIDE 75 MG: 25 TABLET ORAL at 22:36

## 2024-02-19 RX ADMIN — MIRTAZAPINE 7.5 MG: 7.5 TABLET, FILM COATED ORAL at 22:36

## 2024-02-19 RX ADMIN — HYDROXYZINE HYDROCHLORIDE 75 MG: 25 TABLET ORAL at 16:05

## 2024-02-19 RX ADMIN — MELATONIN TAB 3 MG 3 MG: 3 TAB at 22:36

## 2024-02-19 RX ADMIN — HYDROXYZINE HYDROCHLORIDE 75 MG: 25 TABLET ORAL at 08:10

## 2024-02-19 RX ADMIN — ESCITALOPRAM OXALATE 10 MG: 10 TABLET ORAL at 08:10

## 2024-02-19 NOTE — NURSING NOTE
Pt denies SI/HI/AH/VH.  Pt appears paranoid/anxious at times.Present in dayroom and milieu. Medication and meal compliant. Social with select peers. Pt reports mild depression/anxiety. Pt appears disheveled. Pt attends  groups and appears less anxious that he has in the previous week. Pt reports better appetite.  No further concerns as of present. Plan of care ongoing.

## 2024-02-19 NOTE — NURSING NOTE
Patient c/o insomnia , and received Remeron.   At 23:48 Remeron was effective . Pt lying in bed with eye close.

## 2024-02-19 NOTE — PLAN OF CARE
Problem: Ineffective Coping  Goal: Identifies ineffective coping skills  Outcome: Progressing  Goal: Identifies healthy coping skills  Outcome: Progressing  Goal: Demonstrates healthy coping skills  Outcome: Progressing  Goal: Participates in unit activities  Description: Interventions:  - Provide therapeutic environment   - Provide required programming   - Redirect inappropriate behaviors   Outcome: Progressing  Goal: Free from restraint events  Description: - Utilize least restrictive measures   - Provide behavioral interventions   - Redirect inappropriate behaviors   Outcome: Progressing   Patient is active in group and recognizes his poor behaviors/ addictive behavioral patterns and is attempting to be more proactive with his future including where he will live and the level of care he may need.

## 2024-02-19 NOTE — PROGRESS NOTES
"Progress Note - Behavioral Health   Shaquille Ackerman 28 y.o. male MRN: [id number]  Unit/Bed#: [facility] 346-02 Encounter: [id number]    Subjective:   Per nursing report, patient has been calm and cooperative.  He remains medication compliant.  He participates appropriately on the unit. Patient is pleasant and cooperative with interview today.  He reports that his mood is overall good.  He remains anxious about the uncertainty of where he will be going.  He is unsure if seeing with his father would be a superior option to living in a group home.  He currently denies any problems tolerating medications.  He denies any SI, HI, or AVH.    Behavior over the last 24 hours:  unchanged  Sleep: normal  Appetite: normal  Medication side effects: No  ROS: no complaints and all other systems are negative    Mental Status Evaluation:  Appearance:  disheveled and marginal hygiene   Behavior:  calm, pleasant, cooperative, and guarded   Speech:  normal rate and volume   Mood:  \"Good\"   Affect:  constricted   Thought Process:  linear and goal directed   Associations: concrete associations   Thought Content:  no overt delusions   Perceptual Disturbances: denies auditory or visual hallucinations when asked, does not appear responding to internal stimuli   Risk Potential: Suicidal ideation - None  Homicidal ideation - None  Potential for aggression - Not at present   Sensorium:  oriented to person, place, and time/date   Memory:  recent and remote memory grossly intact   Consciousness:  alert and awake   Attention/Concentration: attention span and concentration are age appropriate   Insight:  limited   Judgment: limited   Gait/Station: normal gait/station, normal balance   Motor Activity: no abnormal movements     Medications: all current active meds have been reviewed.  Current Facility-Administered Medications   Medication Dose Route Frequency Provider Last Rate    acetaminophen  650 mg Oral Q6H PRN Zakia Resendez,       acetaminophen  650 mg " Oral Q4H PRN Zakia Resendez, DO      acetaminophen  975 mg Oral Q6H PRN Zakia Resendez, DO      aluminum-magnesium hydroxide-simethicone  30 mL Oral Q4H PRN Zakia Resendez, DO      haloperidol lactate  2.5 mg Intramuscular Q4H PRN Max 4/day Zakia Resendez, DO      And    LORazepam  1 mg Intramuscular Q4H PRN Max 4/day Zakia Resendez, DO      And    benztropine  0.5 mg Intramuscular Q4H PRN Max 4/day Zakia Resendez, DO      haloperidol lactate  5 mg Intramuscular Q4H PRN Max 4/day Zakia Resendez, DO      And    LORazepam  2 mg Intramuscular Q4H PRN Max 4/day Zakia Resendez, DO      And    benztropine  1 mg Intramuscular Q4H PRN Max 4/day Zakia Resendez, DO      benztropine  1 mg Intramuscular Q4H PRN Max 6/day Zakia Resendez, DO      benztropine  1 mg Oral Q4H PRN Max 6/day Zakia Resendez, DO      hydrOXYzine HCL  50 mg Oral Q6H PRN Max 4/day Zakia Resendez, DO      Or    diphenhydrAMINE  50 mg Intramuscular Q6H PRN Zakia Resendez, DO      escitalopram  10 mg Oral Daily Gregory Bennett MD      Artificial Tears  1 drop Both Eyes Q3H PRN Zakia Resendez, DO      haloperidol  1 mg Oral Q6H PRN Zakia Resendez, DO      haloperidol  2.5 mg Oral Q4H PRN Max 4/day Zakia Resendez, DO      haloperidol  5 mg Oral Q4H PRN Max 4/day Zakia Resendez, DO      hydrOXYzine HCL  100 mg Oral Q6H PRN Max 4/day Zakia Resendez, DO      Or    LORazepam  2 mg Intramuscular Q6H PRN Zakia Resendez, DO      hydrOXYzine HCL  25 mg Oral Q6H PRN Max 4/day Zakia Resendez, DO      hydrOXYzine HCL  75 mg Oral TID Gregory Bennett MD      melatonin  3 mg Oral HS Gregory Bennett MD      mirtazapine  7.5 mg Oral HS PRN Gregory Bennett MD      polyethylene glycol  17 g Oral Daily PRN Zakia Resendez, DO      propranolol  10 mg Oral Q8H PRN Zakia Resendez DO      senna-docusate sodium  1 tablet Oral Daily PRN Zakia Resendez DO         Labs: I have personally reviewed all pertinent laboratory/tests results  Most Recent Labs:   Lab  Results   Component Value Date    WBC 4.42 02/01/2024    RBC 4.88 02/01/2024    HGB 15.3 02/01/2024    HCT 44.4 02/01/2024     02/01/2024    RDW 11.9 02/01/2024    NEUTROABS 2.87 02/01/2024    SODIUM 141 02/07/2024    K 4.0 02/07/2024     02/07/2024    CO2 31 02/07/2024    BUN 20 02/07/2024    CREATININE 1.10 02/07/2024    GLUC 79 02/07/2024    CALCIUM 9.4 02/07/2024    AST 12 (L) 02/07/2024    ALT 9 02/07/2024    ALKPHOS 37 02/07/2024    TP 5.9 (L) 02/07/2024    ALB 4.0 02/07/2024    TBILI 0.43 02/07/2024    [id number] 0.968 02/01/2024         Assessment/Plan   Principal Problem:    Major depressive disorder, recurrent episode (HCC)  Active Problems:    Medical clearance for psychiatric admission    Marijuana abuse    Serum total bilirubin elevated    Recommended Treatment:   Continue Lexapro 10 mg daily.  Continue Atarax 75 mg 3 times daily.    Continue all other medications at current doses as above.  Encourage group therapy, milieu therapy and occupational therapy  All current active medications have been reviewed  Encourage group therapy, milieu therapy and occupational therapy  Behavioral Health checks every 7 minutes    ----------------------------------------    Progress Toward Goals: progressing    Risks / Benefits of Treatment:    Risks, benefits, and possible side effects of medications explained to patient and patient verbalizes understanding and agreement for treatment.    Counseling / Coordination of Care:    Patient's progress discussed with staff in treatment team meeting.  Medications, treatment progress and treatment plan reviewed with patient.    Gregory Bennett MD 02/19/24

## 2024-02-19 NOTE — PLAN OF CARE
Problem: Ineffective Coping  Goal: Identifies ineffective coping skills  Outcome: Progressing  Goal: Identifies healthy coping skills  Outcome: Progressing  Goal: Demonstrates healthy coping skills  Outcome: Progressing  Goal: Participates in unit activities  Description: Interventions:  - Provide therapeutic environment   - Provide required programming   - Redirect inappropriate behaviors   Outcome: Progressing  Goal: Understands least restrictive measures  Description: Interventions:  - Utilize least restrictive behavior  Outcome: Progressing  Goal: Free from restraint events  Description: - Utilize least restrictive measures   - Provide behavioral interventions   - Redirect inappropriate behaviors   Outcome: Progressing     Problem: Depression  Goal: Treatment Goal: Demonstrate behavioral control of depressive symptoms, verbalize feelings of improved mood/affect, and adopt new coping skills prior to discharge  Outcome: Progressing  Goal: Verbalize thoughts and feelings  Description: Interventions:  - Assess and re-assess patient's level of risk   - Engage patient in 1:1 interactions, daily, for a minimum of 15 minutes   - Encourage patient to express feelings, fears, frustrations, hopes   Outcome: Progressing  Goal: Refrain from harming self  Description: Interventions:  - Monitor patient closely, per order   - Supervise medication ingestion, monitor effects and side effects   Outcome: Progressing  Goal: Refrain from isolation  Description: Interventions:  - Develop a trusting relationship   - Encourage socialization   Outcome: Progressing  Goal: Refrain from self-neglect  Outcome: Progressing  Goal: Attend and participate in unit activities, including therapeutic, recreational, and educational groups  Description: Interventions:  - Provide therapeutic and educational activities daily, encourage attendance and participation, and document same in the medical record   Outcome: Progressing  Goal: Complete daily  ADLs, including personal hygiene independently, as able  Description: Interventions:  - Observe, teach, and assist patient with ADLS  -  Monitor and promote a balance of rest/activity, with adequate nutrition and elimination   Outcome: Progressing     Problem: Anxiety  Goal: Anxiety is at manageable level  Description: Interventions:  - Assess and monitor patient's anxiety level.   - Monitor for signs and symptoms (heart palpitations, chest pain, shortness of breath, headaches, nausea, feeling jumpy, restlessness, irritable, apprehensive).   - Collaborate with interdisciplinary team and initiate plan and interventions as ordered.  - [place] patient to unit/surroundings  - Explain treatment plan  - Encourage participation in care  - Encourage verbalization of concerns/fears  - Identify coping mechanisms  - Assist in developing anxiety-reducing skills  - Administer/offer alternative therapies  - Limit or eliminate stimulants  Outcome: Progressing     Problem: Nutrition/Hydration-ADULT  Goal: Nutrient/Hydration intake appropriate for improving, restoring or maintaining nutritional needs  Description: Monitor and assess patient's nutrition/hydration status for malnutrition. Collaborate with interdisciplinary team and initiate plan and interventions as ordered.  Monitor patient's weight and dietary intake as ordered or per policy. Utilize nutrition screening tool and intervene as necessary. Determine patient's food preferences and provide high-protein, high-caloric foods as appropriate.     INTERVENTIONS:  - Monitor oral intake, urinary output, labs, and treatment plans  - Assess nutrition and hydration status and recommend course of action  - Evaluate amount of meals eaten  - Assist patient with eating if necessary   - Allow adequate time for meals  - Recommend/ encourage appropriate diets, oral nutritional supplements, and vitamin/mineral supplements  - Order, calculate, and assess calorie counts as needed  - Recommend,  monitor, and adjust tube feedings and TPN/PPN based on assessed needs  - Assess need for intravenous fluids  - Provide specific nutrition/hydration education as appropriate  - Include patient/family/caregiver in decisions related to nutrition  Outcome: Progressing

## 2024-02-19 NOTE — PROGRESS NOTES
02/19/24 1000   Team Meeting   Meeting Type Daily Rounds   Team Members Present   Team Members Present Physician;Nurse;;Other (Discipline and Name)   Physician Team Member Sabrina   Nursing Team Member Alta   Care Management Team Member Sallie   Other (Discipline and Name) Desi Gibbons   Patient/Family Present   Patient Present No   Patient's Family Present No   OTHER   Team Meeting - Additional Comments Plesant,copperative, complaint with medications. PRN remeron for insomnia Sat and Sun night, effective.   Social with peers, intermittently visable on unit.  Attended 3/5 groups since Friday,  Requires an NICOLE for family.  Possible referral to PHP. D/C date and time TBD.

## 2024-02-20 PROCEDURE — 99232 SBSQ HOSP IP/OBS MODERATE 35: CPT | Performed by: STUDENT IN AN ORGANIZED HEALTH CARE EDUCATION/TRAINING PROGRAM

## 2024-02-20 RX ADMIN — ESCITALOPRAM OXALATE 10 MG: 10 TABLET ORAL at 08:06

## 2024-02-20 RX ADMIN — MIRTAZAPINE 7.5 MG: 7.5 TABLET, FILM COATED ORAL at 23:37

## 2024-02-20 RX ADMIN — HYDROXYZINE HYDROCHLORIDE 75 MG: 25 TABLET ORAL at 23:37

## 2024-02-20 RX ADMIN — MELATONIN TAB 3 MG 3 MG: 3 TAB at 23:37

## 2024-02-20 RX ADMIN — HYDROXYZINE HYDROCHLORIDE 75 MG: 25 TABLET ORAL at 08:06

## 2024-02-20 RX ADMIN — HYDROXYZINE HYDROCHLORIDE 75 MG: 25 TABLET ORAL at 16:39

## 2024-02-20 NOTE — NURSING NOTE
Patient is calm and cooperative upon approach. Patient isolative to room this morning, visible for meals. Patient denies SI/HI/AH/VH, anxiety, and depression. Patient is compliant with meds and meals.

## 2024-02-20 NOTE — NURSING NOTE
Patient remained visible and social the uni throughout the evening. Cooperative with routine. Denied any unmet needs. HS medication compliant. Remeron prn requested and received at 2236.    At 2320, patient observed resting in bed.

## 2024-02-20 NOTE — PROGRESS NOTES
02/20/24 1005   Team Meeting   Meeting Type Daily Rounds   Team Members Present   Team Members Present Physician;Nurse;   Physician Team Member Sabrina   Nursing Team Member [facility]   Care Management Team Member Dawood   Patient/Family Present   Patient Present No   Patient's Family Present No     Pt is visible on unit; appears disheveled, tense and anxious. Pt reports feeling depressied and anxious. Received PRN Remeron for sleep; it was effective. Discharge TBD.

## 2024-02-20 NOTE — PROGRESS NOTES
Progress Note - Behavioral Health   Shaquille Ackerman 28 y.o. male MRN: [id number]  Unit/Bed#: [facility] 346-02 Encounter: [id number]    Subjective:   Per nursing report, patient has been appearing more anxious.  He did require as needed Remeron last night for sleep.  He has been overall cooperative and appropriate on the unit.  He remains medication compliant. Patient is frustrated and anxious today.  His anxiety is primarily focused on uncertainty with where he will go from here.  He has not yet spoken to his father.  He denies any problems tolerating medications.  He denies any current SI, HI, or AVH.    Behavior over the last 24 hours:  unchanged  Sleep: normal  Appetite: normal  Medication side effects: No  ROS: no complaints and all other systems are negative    Mental Status Evaluation:  Appearance:  disheveled and marginal hygiene   Behavior:  calm, cooperative, and guarded   Speech:  soft and scant   Mood:  anxious   Affect:  constricted   Thought Process:  linear and goal directed   Associations: concrete associations   Thought Content:  no overt delusions   Perceptual Disturbances: denies auditory or visual hallucinations when asked, does not appear responding to internal stimuli   Risk Potential: Suicidal ideation - None  Homicidal ideation - None  Potential for aggression - Not at present   Sensorium:  oriented to person, place, and time/date   Memory:  recent and remote memory grossly intact   Consciousness:  alert and awake   Attention/Concentration: attention span and concentration are age appropriate   Insight:  limited   Judgment: limited   Gait/Station: normal gait/station, normal balance   Motor Activity: no abnormal movements     Medications: all current active meds have been reviewed.  Current Facility-Administered Medications   Medication Dose Route Frequency Provider Last Rate    acetaminophen  650 mg Oral Q6H PRN Zakia Resendez DO      acetaminophen  650 mg Oral Q4H PRN Zakia Resendez DO       acetaminophen  975 mg Oral Q6H PRN Zakia Resendez, DO      aluminum-magnesium hydroxide-simethicone  30 mL Oral Q4H PRN aZkia Resendez, DO      haloperidol lactate  2.5 mg Intramuscular Q4H PRN Max 4/day Zakia Resendez, DO      And    LORazepam  1 mg Intramuscular Q4H PRN Max 4/day Zakia Resendez, DO      And    benztropine  0.5 mg Intramuscular Q4H PRN Max 4/day Zakia Resendez, DO      haloperidol lactate  5 mg Intramuscular Q4H PRN Max 4/day Zakia Resendez, DO      And    LORazepam  2 mg Intramuscular Q4H PRN Max 4/day Zakia Resendez, DO      And    benztropine  1 mg Intramuscular Q4H PRN Max 4/day Zakia Resendez, DO      benztropine  1 mg Intramuscular Q4H PRN Max 6/day Zakia Resendez, DO      benztropine  1 mg Oral Q4H PRN Max 6/day Zakia Resendez, DO      hydrOXYzine HCL  50 mg Oral Q6H PRN Max 4/day Zakia Resendez, DO      Or    diphenhydrAMINE  50 mg Intramuscular Q6H PRN Zakia Resendez, DO      escitalopram  10 mg Oral Daily Gregory Bennett MD      Artificial Tears  1 drop Both Eyes Q3H PRN Zakia Resendez, DO      haloperidol  1 mg Oral Q6H PRN Zakia Resendez, DO      haloperidol  2.5 mg Oral Q4H PRN Max 4/day Zakia Resendez, DO      haloperidol  5 mg Oral Q4H PRN Max 4/day Zakia Resendez, DO      hydrOXYzine HCL  100 mg Oral Q6H PRN Max 4/day Zakia Resendez, DO      Or    LORazepam  2 mg Intramuscular Q6H PRN Zakia Resendez, DO      hydrOXYzine HCL  25 mg Oral Q6H PRN Max 4/day Zakia Resendez, DO      hydrOXYzine HCL  75 mg Oral TID Gregory Bennett MD      melatonin  3 mg Oral HS Gregory Bennett MD      mirtazapine  7.5 mg Oral HS PRN Gregory Bennett MD      polyethylene glycol  17 g Oral Daily PRN Zakia Resendez, DO      propranolol  10 mg Oral Q8H PRN Zakia Resendez, DO      senna-docusate sodium  1 tablet Oral Daily PRN Zakia Resendez DO         Labs: I have personally reviewed all pertinent laboratory/tests results  Most Recent Labs:   Lab Results   Component Value Date    WBC  4.42 02/01/2024    RBC 4.88 02/01/2024    HGB 15.3 02/01/2024    HCT 44.4 02/01/2024     02/01/2024    RDW 11.9 02/01/2024    NEUTROABS 2.87 02/01/2024    SODIUM 141 02/07/2024    K 4.0 02/07/2024     02/07/2024    CO2 31 02/07/2024    BUN 20 02/07/2024    CREATININE 1.10 02/07/2024    GLUC 79 02/07/2024    CALCIUM 9.4 02/07/2024    AST 12 (L) 02/07/2024    ALT 9 02/07/2024    ALKPHOS 37 02/07/2024    TP 5.9 (L) 02/07/2024    ALB 4.0 02/07/2024    TBILI 0.43 02/07/2024    [id number] 0.968 02/01/2024         Assessment/Plan   Principal Problem:    Major depressive disorder, recurrent episode (HCC)  Active Problems:    Medical clearance for psychiatric admission    Marijuana abuse    Serum total bilirubin elevated    Recommended Treatment:   Continue Lexapro 10 mg daily.  Continue Atarax 75 mg 3 times daily.  Continue melatonin 3 mg nightly.  Continue all other medications at current doses as above.  Encourage group therapy, milieu therapy and occupational therapy  All current active medications have been reviewed  Encourage group therapy, milieu therapy and occupational therapy  Behavioral Health checks every 7 minutes    ----------------------------------------    Progress Toward Goals: progressing    Risks / Benefits of Treatment:    Risks, benefits, and possible side effects of medications explained to patient and patient verbalizes understanding and agreement for treatment.    Counseling / Coordination of Care:    Patient's progress discussed with staff in treatment team meeting.  Medications, treatment progress and treatment plan reviewed with patient.    Gregory Bennett MD 02/20/24

## 2024-02-20 NOTE — PLAN OF CARE
Problem: Ineffective Coping  Goal: Identifies ineffective coping skills  Outcome: Progressing  Goal: Identifies healthy coping skills  Outcome: Progressing  Goal: Demonstrates healthy coping skills  Outcome: Progressing  Goal: Participates in unit activities  Description: Interventions:  - Provide therapeutic environment   - Provide required programming   - Redirect inappropriate behaviors   Outcome: Progressing  Goal: Patient/Family participate in treatment and DC plans  Description: Interventions:  - Provide therapeutic environment  Outcome: Progressing  Goal: Patient/Family verbalizes awareness of resources  Outcome: Progressing  Goal: Understands least restrictive measures  Description: Interventions:  - Utilize least restrictive behavior  Outcome: Progressing  Goal: Free from restraint events  Description: - Utilize least restrictive measures   - Provide behavioral interventions   - Redirect inappropriate behaviors   Outcome: Progressing     Problem: Depression  Goal: Treatment Goal: Demonstrate behavioral control of depressive symptoms, verbalize feelings of improved mood/affect, and adopt new coping skills prior to discharge  Outcome: Progressing  Goal: Verbalize thoughts and feelings  Description: Interventions:  - Assess and re-assess patient's level of risk   - Engage patient in 1:1 interactions, daily, for a minimum of 15 minutes   - Encourage patient to express feelings, fears, frustrations, hopes   Outcome: Progressing  Goal: Refrain from harming self  Description: Interventions:  - Monitor patient closely, per order   - Supervise medication ingestion, monitor effects and side effects   Outcome: Progressing  Goal: Refrain from isolation  Description: Interventions:  - Develop a trusting relationship   - Encourage socialization   Outcome: Progressing  Goal: Refrain from self-neglect  Outcome: Progressing  Goal: Attend and participate in unit activities, including therapeutic, recreational, and  educational groups  Description: Interventions:  - Provide therapeutic and educational activities daily, encourage attendance and participation, and document same in the medical record   Outcome: Progressing  Goal: Complete daily ADLs, including personal hygiene independently, as able  Description: Interventions:  - Observe, teach, and assist patient with ADLS  -  Monitor and promote a balance of rest/activity, with adequate nutrition and elimination   Outcome: Progressing     Problem: Anxiety  Goal: Anxiety is at manageable level  Description: Interventions:  - Assess and monitor patient's anxiety level.   - Monitor for signs and symptoms (heart palpitations, chest pain, shortness of breath, headaches, nausea, feeling jumpy, restlessness, irritable, apprehensive).   - Collaborate with interdisciplinary team and initiate plan and interventions as ordered.  - [place] patient to unit/surroundings  - Explain treatment plan  - Encourage participation in care  - Encourage verbalization of concerns/fears  - Identify coping mechanisms  - Assist in developing anxiety-reducing skills  - Administer/offer alternative therapies  - Limit or eliminate stimulants  Outcome: Progressing     Problem: Nutrition/Hydration-ADULT  Goal: Nutrient/Hydration intake appropriate for improving, restoring or maintaining nutritional needs  Description: Monitor and assess patient's nutrition/hydration status for malnutrition. Collaborate with interdisciplinary team and initiate plan and interventions as ordered.  Monitor patient's weight and dietary intake as ordered or per policy. Utilize nutrition screening tool and intervene as necessary. Determine patient's food preferences and provide high-protein, high-caloric foods as appropriate.     INTERVENTIONS:  - Monitor oral intake, urinary output, labs, and treatment plans  - Assess nutrition and hydration status and recommend course of action  - Evaluate amount of meals eaten  - Assist patient with  eating if necessary   - Allow adequate time for meals  - Recommend/ encourage appropriate diets, oral nutritional supplements, and vitamin/mineral supplements  - Order, calculate, and assess calorie counts as needed  - Recommend, monitor, and adjust tube feedings and TPN/PPN based on assessed needs  - Assess need for intravenous fluids  - Provide specific nutrition/hydration education as appropriate  - Include patient/family/caregiver in decisions related to nutrition  Outcome: Progressing     Problem: DISCHARGE PLANNING  Goal: Discharge to home or other facility with appropriate resources  Description: INTERVENTIONS:  - Identify barriers to discharge w/patient and caregiver  - Arrange for needed discharge resources and transportation as appropriate  - Identify discharge learning needs (meds, wound care, etc.)  - Arrange for interpretive services to assist at discharge as needed  - Refer to Case Management Department for coordinating discharge planning if the patient needs post-hospital services based on physician/advanced practitioner order or complex needs related to functional status, cognitive ability, or social support system  Outcome: Progressing

## 2024-02-21 PROCEDURE — 99232 SBSQ HOSP IP/OBS MODERATE 35: CPT | Performed by: PSYCHIATRY & NEUROLOGY

## 2024-02-21 RX ADMIN — MIRTAZAPINE 7.5 MG: 7.5 TABLET, FILM COATED ORAL at 22:44

## 2024-02-21 RX ADMIN — HYDROXYZINE HYDROCHLORIDE 75 MG: 25 TABLET ORAL at 08:32

## 2024-02-21 RX ADMIN — HYDROXYZINE HYDROCHLORIDE 75 MG: 25 TABLET ORAL at 21:53

## 2024-02-21 RX ADMIN — ACETAMINOPHEN 650 MG: 325 TABLET ORAL at 22:44

## 2024-02-21 RX ADMIN — MELATONIN TAB 3 MG 3 MG: 3 TAB at 22:45

## 2024-02-21 RX ADMIN — HYDROXYZINE HYDROCHLORIDE 75 MG: 25 TABLET ORAL at 15:46

## 2024-02-21 RX ADMIN — ESCITALOPRAM OXALATE 10 MG: 10 TABLET ORAL at 08:32

## 2024-02-21 NOTE — PROGRESS NOTES
02/21/24 0958   Team Meeting   Meeting Type Daily Rounds   Team Members Present   Team Members Present Physician;Nurse;   Physician Team Member Alesha   Nursing Team Member [facility] [place]   Care Management Team Member Dawood   Patient/Family Present   Patient Present No   Patient's Family Present No     Pt has pressured speech and was seen pacing the hallway, med/meal compliant. Pt reported that he is frustrated with waiting for placement. Pt denies symptoms. Pt received PRN Remeron for sleep; it was effective. Discharge is pending placement.

## 2024-02-21 NOTE — PROGRESS NOTES
"Progress Note - Behavioral Health   Shaquille Ackerman 28 y.o. male MRN: [id number]  Unit/Bed#: [facility] 346-02 Encounter: [id number]    Assessment/Plan   Principal Problem:    Major depressive disorder, recurrent episode (HCC)  Active Problems:    Medical clearance for psychiatric admission    Marijuana abuse    Serum total bilirubin elevated      Continue Lexapro 10 mg daily for depression and anxiety  Continue Atarax 75 mg 3 times daily for anxiety  Continue melatonin 3 mg for insomnia  Continue other medications/PRNs as noted below.  Continue to promote patient participation in therapeutic milieu.  Continue medical management by primary team.  Discharge disposition:  TBD.  Patient continues to require inpatient behavioral health admission at this time.  Legal status: 201 voluntary commitment    Subjective:  The patient was evaluated this morning for continuity of care and no acute distress noted throughout the evaluation.  Over the past 24 hours staff noted the patient was seen in the hallway, pacing, meal and medication compliant.  Patient reported frustration with waiting for placement to staff.  Patient received as needed Remeron for sleep at 2337, which was effective.      Today on exam, the patient was anxious appearing, bizarre, but overall cooperative and pleasant and reports that he is doing \"all right.\"  The patient reports that he plans on calling his dad, but does not know when because it is \"this whole thing.\"  Discussed with patient that if you would like to go over what he wants to say to his dad, writer would be willing to help.  Patient declined.  Patient states that he is enjoying reading his book that he got from another patient on the unit.  He states that he likes going to group with Viki and Monica.  Patient denies active or passive suicidal or homicidal ideation.  Denies auditory or visual hallucinations.  No other concerns or complaints by patient.    Current Medications:  Current " Facility-Administered Medications   Medication Dose Route Frequency Provider Last Rate    acetaminophen  650 mg Oral Q6H PRN Zakia Mal, DO      acetaminophen  650 mg Oral Q4H PRN Zakia Mal, DO      acetaminophen  975 mg Oral Q6H PRN Zakia Mal, DO      aluminum-magnesium hydroxide-simethicone  30 mL Oral Q4H PRN Zakia Mal, DO      haloperidol lactate  2.5 mg Intramuscular Q4H PRN Max 4/day Zakia Resendez, DO      And    LORazepam  1 mg Intramuscular Q4H PRN Max 4/day Zakia Mal, DO      And    benztropine  0.5 mg Intramuscular Q4H PRN Max 4/day Zakia Mal, DO      haloperidol lactate  5 mg Intramuscular Q4H PRN Max 4/day Zakia Mal, DO      And    LORazepam  2 mg Intramuscular Q4H PRN Max 4/day Zakiaflores Resendez, DO      And    benztropine  1 mg Intramuscular Q4H PRN Max 4/day Zakiaflores eRsendez, DO      benztropine  1 mg Intramuscular Q4H PRN Max 6/day Zakia Resendez, DO      benztropine  1 mg Oral Q4H PRN Max 6/day Zakiaflores Resendez, DO      hydrOXYzine HCL  50 mg Oral Q6H PRN Max 4/day Zakia Resednez, DO      Or    diphenhydrAMINE  50 mg Intramuscular Q6H PRN Zakia Resendez, DO      escitalopram  10 mg Oral Daily Gregory Bennett MD      Artificial Tears  1 drop Both Eyes Q3H PRN Zakia Resendez, DO      haloperidol  1 mg Oral Q6H PRN Zakia Resendez, DO      haloperidol  2.5 mg Oral Q4H PRN Max 4/day Zakiaflores Resendez, DO      haloperidol  5 mg Oral Q4H PRN Max 4/day Zakiaflores Resendez, DO      hydrOXYzine HCL  100 mg Oral Q6H PRN Max 4/day Zakia Resendez, DO      Or    LORazepam  2 mg Intramuscular Q6H PRN Zakia Resendez, DO      hydrOXYzine HCL  25 mg Oral Q6H PRN Max 4/day Zakiaflores Resendez, DO      hydrOXYzine HCL  75 mg Oral TID Gregory Bennett MD      melatonin  3 mg Oral HS Gregory Bennett MD      mirtazapine  7.5 mg Oral HS PRN Gregory Bennett MD      polyethylene glycol  17 g Oral Daily PRN Zakia Resendez DO      propranolol  10 mg Oral Q8H PRN Zakia Resendez DO       "senna-docusate sodium  1 tablet Oral Daily PRN Zakia Resendez DO         Behavioral Health Medications: all current active meds have been reviewed and continue current psychiatric medications.    Vital signs in last 24 hours:  Temp:  [97.6 °F (36.4 °C)-98.7 °F (37.1 °C)] 97.6 °F (36.4 °C)  HR:  [62-79] 62  Resp:  [16-18] 16  BP: (120-124)/(61-62) 120/62    Laboratory results:  I have personally reviewed all pertinent laboratory/tests results.  Most Recent Labs:   Lab Results   Component Value Date    WBC 4.42 02/01/2024    RBC 4.88 02/01/2024    HGB 15.3 02/01/2024    HCT 44.4 02/01/2024     02/01/2024    RDW 11.9 02/01/2024    NEUTROABS 2.87 02/01/2024    SODIUM 141 02/07/2024    K 4.0 02/07/2024     02/07/2024    CO2 31 02/07/2024    BUN 20 02/07/2024    CREATININE 1.10 02/07/2024    GLUC 79 02/07/2024    GLUF 79 02/07/2024    CALCIUM 9.4 02/07/2024    AST 12 (L) 02/07/2024    ALT 9 02/07/2024    ALKPHOS 37 02/07/2024    TP 5.9 (L) 02/07/2024    ALB 4.0 02/07/2024    TBILI 0.43 02/07/2024    [id number] 0.968 02/01/2024       Psychiatric Review of Systems:  Behavior over the last 24 hours:  unchanged  Sleep: difficulty falling asleep, improved after Remeron  Appetite: fair  Medication side effects: No   ROS: all other systems are negative    Mental Status Evaluation:    Appearance:  Overtly appearing  male, dressed casually, disheveled, marginal hygiene, looks stated age, wearing glasses, laying in bed with book   Behavior:  Pleasant, bizarre, guarded, intense eye contact   Speech:  scant, delayed, soft   Mood:  \"All right\"   Affect:  Anxious   Thought Process:  goal directed, linear, decreased rate of thoughts   Associations: concrete associations   Thought Content:  no overt delusions   Perceptual Disturbances: denies auditory or visual hallucinations when asked, does not appear responding to internal stimuli   Risk Potential: Suicidal ideation - None at present  Homicidal ideation - None " at present  Potential for aggression - Not at present   Sensorium:  oriented to person, place, and time/date   Memory:  recent and remote memory grossly intact   Consciousness:  alert and awake   Attention/Concentration: attention span and concentration are age appropriate   Insight:  limited   Judgment: limited   Gait/Station: normal gait/station   Motor Activity: no abnormal movements         Progress Toward Goals: progressing slowly    Recommended Treatment:   See above for assessment and plan.     Risks, benefits and possible side effects of Medications:   Risks, benefits, and possible side effects of medications explained to patient and patient verbalizes understanding.      This note has been constructed using a voice recognition system.  There may be translation, syntax, or grammatical errors. If you have any questions, please contact the dictating provider.    Kylah Cason D.O.  Psychiatry Resident, PGY-1  This note was not shared with the patient due to reasonable likelihood of causing patient harm

## 2024-02-21 NOTE — NURSING NOTE
Patient has been in the milieu. Anxious appearing and at times pacing the hallway at speed. Speech also pressured.  Remeron 7.5mgs po prn for insomnia at 2337

## 2024-02-21 NOTE — PLAN OF CARE
Problem: Ineffective Coping  Goal: Identifies ineffective coping skills  Outcome: Progressing  Goal: Identifies healthy coping skills  Outcome: Progressing  Goal: Demonstrates healthy coping skills  Outcome: Progressing  Goal: Participates in unit activities  Description: Interventions:  - Provide therapeutic environment   - Provide required programming   - Redirect inappropriate behaviors   Outcome: Progressing  Goal: Patient/Family participate in treatment and DC plans  Description: Interventions:  - Provide therapeutic environment  Outcome: Progressing  Goal: Patient/Family verbalizes awareness of resources  Outcome: Progressing  Goal: Understands least restrictive measures  Description: Interventions:  - Utilize least restrictive behavior  Outcome: Progressing  Goal: Free from restraint events  Description: - Utilize least restrictive measures   - Provide behavioral interventions   - Redirect inappropriate behaviors   Outcome: Progressing     Problem: Depression  Goal: Treatment Goal: Demonstrate behavioral control of depressive symptoms, verbalize feelings of improved mood/affect, and adopt new coping skills prior to discharge  Outcome: Progressing  Goal: Verbalize thoughts and feelings  Description: Interventions:  - Assess and re-assess patient's level of risk   - Engage patient in 1:1 interactions, daily, for a minimum of 15 minutes   - Encourage patient to express feelings, fears, frustrations, hopes   Outcome: Progressing  Goal: Refrain from harming self  Description: Interventions:  - Monitor patient closely, per order   - Supervise medication ingestion, monitor effects and side effects   Outcome: Progressing  Goal: Refrain from isolation  Description: Interventions:  - Develop a trusting relationship   - Encourage socialization   Outcome: Progressing  Goal: Refrain from self-neglect  Outcome: Progressing  Goal: Attend and participate in unit activities, including therapeutic, recreational, and  educational groups  Description: Interventions:  - Provide therapeutic and educational activities daily, encourage attendance and participation, and document same in the medical record   Outcome: Progressing  Goal: Complete daily ADLs, including personal hygiene independently, as able  Description: Interventions:  - Observe, teach, and assist patient with ADLS  -  Monitor and promote a balance of rest/activity, with adequate nutrition and elimination   Outcome: Progressing     Problem: Anxiety  Goal: Anxiety is at manageable level  Description: Interventions:  - Assess and monitor patient's anxiety level.   - Monitor for signs and symptoms (heart palpitations, chest pain, shortness of breath, headaches, nausea, feeling jumpy, restlessness, irritable, apprehensive).   - Collaborate with interdisciplinary team and initiate plan and interventions as ordered.  - [place] patient to unit/surroundings  - Explain treatment plan  - Encourage participation in care  - Encourage verbalization of concerns/fears  - Identify coping mechanisms  - Assist in developing anxiety-reducing skills  - Administer/offer alternative therapies  - Limit or eliminate stimulants  Outcome: Progressing     Problem: Nutrition/Hydration-ADULT  Goal: Nutrient/Hydration intake appropriate for improving, restoring or maintaining nutritional needs  Description: Monitor and assess patient's nutrition/hydration status for malnutrition. Collaborate with interdisciplinary team and initiate plan and interventions as ordered.  Monitor patient's weight and dietary intake as ordered or per policy. Utilize nutrition screening tool and intervene as necessary. Determine patient's food preferences and provide high-protein, high-caloric foods as appropriate.     INTERVENTIONS:  - Monitor oral intake, urinary output, labs, and treatment plans  - Assess nutrition and hydration status and recommend course of action  - Evaluate amount of meals eaten  - Assist patient with  eating if necessary   - Allow adequate time for meals  - Recommend/ encourage appropriate diets, oral nutritional supplements, and vitamin/mineral supplements  - Order, calculate, and assess calorie counts as needed  - Recommend, monitor, and adjust tube feedings and TPN/PPN based on assessed needs  - Assess need for intravenous fluids  - Provide specific nutrition/hydration education as appropriate  - Include patient/family/caregiver in decisions related to nutrition  Outcome: Progressing     Problem: DISCHARGE PLANNING  Goal: Discharge to home or other facility with appropriate resources  Description: INTERVENTIONS:  - Identify barriers to discharge w/patient and caregiver  - Arrange for needed discharge resources and transportation as appropriate  - Identify discharge learning needs (meds, wound care, etc.)  - Arrange for interpretive services to assist at discharge as needed  - Refer to Case Management Department for coordinating discharge planning if the patient needs post-hospital services based on physician/advanced practitioner order or complex needs related to functional status, cognitive ability, or social support system  Outcome: Progressing

## 2024-02-21 NOTE — PLAN OF CARE
Problem: Ineffective Coping  Goal: Identifies healthy coping skills  Outcome: Progressing  Goal: Demonstrates healthy coping skills  Outcome: Progressing  Goal: Participates in unit activities  Description: Interventions:  - Provide therapeutic environment   - Provide required programming   - Redirect inappropriate behaviors   Outcome: Progressing     Problem: Depression  Goal: Attend and participate in unit activities, including therapeutic, recreational, and educational groups  Description: Interventions:  - Provide therapeutic and educational activities daily, encourage attendance and participation, and document same in the medical record   Outcome: Progressing   Patient uses groups and peers sharing his struggles and open to new coping strategies.

## 2024-02-21 NOTE — NURSING NOTE
Pt observed in dayroom and pt room. Pt is social with his peers and cooperative with staff. He is wearing hospital attire (pt preference). Medication and meal compliant. Pt denies SI/HI/AH/VH, or pain. He has attended and participated in group today. No unmet needs.

## 2024-02-22 PROCEDURE — 99232 SBSQ HOSP IP/OBS MODERATE 35: CPT | Performed by: PSYCHIATRY & NEUROLOGY

## 2024-02-22 RX ORDER — LANOLIN ALCOHOL/MO/W.PET/CERES
6 CREAM (GRAM) TOPICAL
Status: DISCONTINUED | OUTPATIENT
Start: 2024-02-22 | End: 2024-03-11 | Stop reason: HOSPADM

## 2024-02-22 RX ORDER — ESCITALOPRAM OXALATE 5 MG/1
5 TABLET ORAL DAILY
Status: DISCONTINUED | OUTPATIENT
Start: 2024-02-22 | End: 2024-02-22

## 2024-02-22 RX ADMIN — HYDROXYZINE HYDROCHLORIDE 75 MG: 25 TABLET ORAL at 08:30

## 2024-02-22 RX ADMIN — ESCITALOPRAM OXALATE 10 MG: 10 TABLET ORAL at 08:30

## 2024-02-22 RX ADMIN — HYDROXYZINE HYDROCHLORIDE 75 MG: 25 TABLET ORAL at 22:10

## 2024-02-22 RX ADMIN — MIRTAZAPINE 7.5 MG: 7.5 TABLET, FILM COATED ORAL at 22:13

## 2024-02-22 RX ADMIN — ESCITALOPRAM OXALATE 5 MG: 5 TABLET, FILM COATED ORAL at 10:23

## 2024-02-22 RX ADMIN — HYDROXYZINE HYDROCHLORIDE 75 MG: 25 TABLET ORAL at 17:00

## 2024-02-22 RX ADMIN — MELATONIN TAB 3 MG 6 MG: 3 TAB at 22:09

## 2024-02-22 NOTE — NURSING NOTE
Pt awake and alert, visible on the unit intermittently. Pt denies SI/HI/AVH at this time, denies anxiety or depression. Reports fair sleep. Pt compliant with scheduled medications and meals. Scant, guarded, cooperative. Denies any unmet needs. Encouraged group. Q7 minute safety checks maintained.

## 2024-02-22 NOTE — PLAN OF CARE
Problem: Ineffective Coping  Goal: Identifies ineffective coping skills  Outcome: Progressing  Goal: Identifies healthy coping skills  Outcome: Progressing  Goal: Demonstrates healthy coping skills  Outcome: Progressing  Goal: Participates in unit activities  Description: Interventions:  - Provide therapeutic environment   - Provide required programming   - Redirect inappropriate behaviors   Outcome: Progressing  Goal: Patient/Family participate in treatment and DC plans  Description: Interventions:  - Provide therapeutic environment  Outcome: Progressing  Goal: Patient/Family verbalizes awareness of resources  Outcome: Progressing  Goal: Understands least restrictive measures  Description: Interventions:  - Utilize least restrictive behavior  Outcome: Progressing  Goal: Free from restraint events  Description: - Utilize least restrictive measures   - Provide behavioral interventions   - Redirect inappropriate behaviors   Outcome: Progressing     Problem: Depression  Goal: Treatment Goal: Demonstrate behavioral control of depressive symptoms, verbalize feelings of improved mood/affect, and adopt new coping skills prior to discharge  Outcome: Progressing  Goal: Verbalize thoughts and feelings  Description: Interventions:  - Assess and re-assess patient's level of risk   - Engage patient in 1:1 interactions, daily, for a minimum of 15 minutes   - Encourage patient to express feelings, fears, frustrations, hopes   Outcome: Progressing  Goal: Refrain from harming self  Description: Interventions:  - Monitor patient closely, per order   - Supervise medication ingestion, monitor effects and side effects   Outcome: Progressing  Goal: Refrain from isolation  Description: Interventions:  - Develop a trusting relationship   - Encourage socialization   Outcome: Progressing  Goal: Refrain from self-neglect  Outcome: Progressing  Goal: Attend and participate in unit activities, including therapeutic, recreational, and  educational groups  Description: Interventions:  - Provide therapeutic and educational activities daily, encourage attendance and participation, and document same in the medical record   Outcome: Progressing  Goal: Complete daily ADLs, including personal hygiene independently, as able  Description: Interventions:  - Observe, teach, and assist patient with ADLS  -  Monitor and promote a balance of rest/activity, with adequate nutrition and elimination   Outcome: Progressing     Problem: Anxiety  Goal: Anxiety is at manageable level  Description: Interventions:  - Assess and monitor patient's anxiety level.   - Monitor for signs and symptoms (heart palpitations, chest pain, shortness of breath, headaches, nausea, feeling jumpy, restlessness, irritable, apprehensive).   - Collaborate with interdisciplinary team and initiate plan and interventions as ordered.  - Matthews patient to unit/surroundings  - Explain treatment plan  - Encourage participation in care  - Encourage verbalization of concerns/fears  - Identify coping mechanisms  - Assist in developing anxiety-reducing skills  - Administer/offer alternative therapies  - Limit or eliminate stimulants  Outcome: Progressing     Problem: Nutrition/Hydration-ADULT  Goal: Nutrient/Hydration intake appropriate for improving, restoring or maintaining nutritional needs  Description: Monitor and assess patient's nutrition/hydration status for malnutrition. Collaborate with interdisciplinary team and initiate plan and interventions as ordered.  Monitor patient's weight and dietary intake as ordered or per policy. Utilize nutrition screening tool and intervene as necessary. Determine patient's food preferences and provide high-protein, high-caloric foods as appropriate.     INTERVENTIONS:  - Monitor oral intake, urinary output, labs, and treatment plans  - Assess nutrition and hydration status and recommend course of action  - Evaluate amount of meals eaten  - Assist patient with  eating if necessary   - Allow adequate time for meals  - Recommend/ encourage appropriate diets, oral nutritional supplements, and vitamin/mineral supplements  - Order, calculate, and assess calorie counts as needed  - Recommend, monitor, and adjust tube feedings and TPN/PPN based on assessed needs  - Assess need for intravenous fluids  - Provide specific nutrition/hydration education as appropriate  - Include patient/family/caregiver in decisions related to nutrition  Outcome: Progressing     Problem: DISCHARGE PLANNING  Goal: Discharge to home or other facility with appropriate resources  Description: INTERVENTIONS:  - Identify barriers to discharge w/patient and caregiver  - Arrange for needed discharge resources and transportation as appropriate  - Identify discharge learning needs (meds, wound care, etc.)  - Arrange for interpretive services to assist at discharge as needed  - Refer to Case Management Department for coordinating discharge planning if the patient needs post-hospital services based on physician/advanced practitioner order or complex needs related to functional status, cognitive ability, or social support system  Outcome: Progressing

## 2024-02-22 NOTE — PROGRESS NOTES
"Progress Note - Behavioral Health   Shaquille Ackerman 28 y.o. male MRN: [id number]  Unit/Bed#: [facility] 346-02 Encounter: [id number]    Assessment/Plan   Principal Problem:    Major depressive disorder, recurrent episode (HCC)  Active Problems:    Medical clearance for psychiatric admission    Marijuana abuse    Serum total bilirubin elevated      Increase Lexapro to 15 mg daily starting today for depression and anxiety  Continue Atarax 75 mg 3 times daily for anxiety  Increase melatonin to 6 mg daily at bedtime for insomnia  Continue other medications/PRNs as noted below.  Continue to promote patient participation in therapeutic milieu.  Continue medical management by primary team.  Discharge disposition: TBD.  Patient continues to require inpatient behavior health admission at this time.  Legal status: 201 voluntary commitment    Subjective:  The patient was evaluated this morning for continuity of care and no acute distress noted throughout the evaluation.  Over the past 24 hours staff noted the patient was social, participating in groups, seen in the dayroom, bizarre and severely anxious at times.  Patient denied all symptoms including feeling anxious.  Patient was meal/medication compliant.  Patient requested PRN Tylenol 650 mg at 2244 for lower left-sided tooth pain and Remeron 7.5 mg for sleep.    Today on exam the patient was anxious appearing, guarded and scant and reports that he is doing \"all right.\"  Patient states that he plans to call his father at 5 PM today.  Patient states that he is going to start at the conversation casually before getting into whether or not patient can live with his father.  Patient asked writer multiple times the process of making mirtazapine.  We discussed increasing Lexapro to 15 mg daily for increased anxiety, patient is agreeable.  We discussed increasing melatonin to 6 mg for difficulty sleeping, patient is agreeable.  Patient denies active or passive suicidal or homicidal ideation.  " Denies AVH.  No other concerns or complaints by patient.    Current Medications:  Current Facility-Administered Medications   Medication Dose Route Frequency Provider Last Rate    acetaminophen  650 mg Oral Q6H PRN Zakia Mal, DO      acetaminophen  650 mg Oral Q4H PRN Zakia Mal, DO      acetaminophen  975 mg Oral Q6H PRN Zakia Mal, DO      aluminum-magnesium hydroxide-simethicone  30 mL Oral Q4H PRN Zakia Resendez, DO      haloperidol lactate  2.5 mg Intramuscular Q4H PRN Max 4/day Zakiaflores Resendez, DO      And    LORazepam  1 mg Intramuscular Q4H PRN Max 4/day Zakia Mal, DO      And    benztropine  0.5 mg Intramuscular Q4H PRN Max 4/day Zakiaflores Resendez, DO      haloperidol lactate  5 mg Intramuscular Q4H PRN Max 4/day Zakiaflores Resendez, DO      And    LORazepam  2 mg Intramuscular Q4H PRN Max 4/day Zakiaflores Resendez, DO      And    benztropine  1 mg Intramuscular Q4H PRN Max 4/day Zakia Resendez, DO      benztropine  1 mg Intramuscular Q4H PRN Max 6/day Zakiaflores Resendez, DO      benztropine  1 mg Oral Q4H PRN Max 6/day Zakiaflores Resendez, DO      hydrOXYzine HCL  50 mg Oral Q6H PRN Max 4/day Zakia Resendez, DO      Or    diphenhydrAMINE  50 mg Intramuscular Q6H PRN Zakia Resendez, DO      [START ON 2/23/2024] escitalopram  15 mg Oral Daily Kylah Kamla, DO      escitalopram  5 mg Oral Daily Kylah Cason, DO      Artificial Tears  1 drop Both Eyes Q3H PRN Zakia Resendez, DO      haloperidol  1 mg Oral Q6H PRN Zakia Resendez, DO      haloperidol  2.5 mg Oral Q4H PRN Max 4/day Zakia Resendez, DO      haloperidol  5 mg Oral Q4H PRN Max 4/day Zakia Mal, DO      hydrOXYzine HCL  100 mg Oral Q6H PRN Max 4/day Zakia Resendez, DO      Or    LORazepam  2 mg Intramuscular Q6H PRN Zakia Resendez, DO      hydrOXYzine HCL  25 mg Oral Q6H PRN Max 4/day Zakiaflores Resendez, DO      hydrOXYzine HCL  75 mg Oral TID Gregory Bennett MD      melatonin  6 mg Oral HS Kylah Cason DO      mirtazapine  7.5 mg Oral HS NICOLE Jenkins  "Yoni Bennett MD      polyethylene glycol  17 g Oral Daily PRN Zakia Resendez,       propranolol  10 mg Oral Q8H PRN Zakia Resendez,       senna-docusate sodium  1 tablet Oral Daily PRN Zakia Resendez,          Behavioral Health Medications: all current active meds have been reviewed and continue current psychiatric medications.    Vital signs in last 24 hours:  Temp:  [97.8 °F (36.6 °C)-98.4 °F (36.9 °C)] 97.8 °F (36.6 °C)  HR:  [61-79] 61  Resp:  [16] 16  BP: (118-120)/(57-60) 118/57    Laboratory results:  I have personally reviewed all pertinent laboratory/tests results.  Most Recent Labs:   Lab Results   Component Value Date    WBC 4.42 02/01/2024    RBC 4.88 02/01/2024    HGB 15.3 02/01/2024    HCT 44.4 02/01/2024     02/01/2024    RDW 11.9 02/01/2024    NEUTROABS 2.87 02/01/2024    SODIUM 141 02/07/2024    K 4.0 02/07/2024     02/07/2024    CO2 31 02/07/2024    BUN 20 02/07/2024    CREATININE 1.10 02/07/2024    GLUC 79 02/07/2024    GLUF 79 02/07/2024    CALCIUM 9.4 02/07/2024    AST 12 (L) 02/07/2024    ALT 9 02/07/2024    ALKPHOS 37 02/07/2024    TP 5.9 (L) 02/07/2024    ALB 4.0 02/07/2024    TBILI 0.43 02/07/2024    [id number] 0.968 02/01/2024       Psychiatric Review of Systems:  Behavior over the last 24 hours:  unchanged  Sleep: difficulty falling asleep  Appetite: fair  Medication side effects: No   ROS: all other systems are negative    Mental Status Evaluation:    Appearance:  Overtly appearing  male, casually dressed, disheveled, marginal hygiene, looks stated age, wearing glasses, laying in bed with book   Behavior:  cooperative, calm, bizarre, intense eye contact   Speech:  normal rate and volume, scant, delayed at times in between thoughts   Mood:  \"All right\"   Affect:  constricted, anxious   Thought Process:  Overall goal-directed and linear, decreased rates of thoughts    Associations: concrete associations   Thought Content:  no overt delusions   Perceptual " Disturbances: denies auditory or visual hallucinations when asked, but appears distracted, does not appear responding to internal stimuli   Risk Potential: Suicidal ideation - None at present  Homicidal ideation - None at present  Potential for aggression - Not at present   Sensorium:  oriented to person, place, and time/date   Memory:  recent and remote memory grossly intact   Consciousness:  alert and awake   Attention/Concentration: attention span and concentration are age appropriate   Insight:  limited   Judgment: limited   Gait/Station: normal gait/station   Motor Activity: no abnormal movements         Progress Toward Goals: progressing slowly    Recommended Treatment:   See above for assessment and plan.     Risks, benefits and possible side effects of Medications:   Risks, benefits, and possible side effects of medications explained to patient and patient verbalizes understanding.      This note has been constructed using a voice recognition system.  There may be translation, syntax, or grammatical errors. If you have any questions, please contact the dictating provider.    Kylah Cason D.O.  Psychiatry Resident, PGY-1  This note was not shared with the patient due to reasonable likelihood of causing patient harm

## 2024-02-22 NOTE — NURSING NOTE
"Pt observed in dayroom, sitting in corner watching TV for majority of shift. Pt appears, bizarre, guarded and severely anxious upon interaction. Pt exhibits delayed verbal responses, darting eyes, poor eye contact, psychomotor hyperactivity noted. Pt denies anxiety when asked. Pt requested to take scheduled medications \"later\" multiple times until prompted with encouragement. Pt agreeable, adherent with medications. Pt denies SI/HI/AH/VH and depression.     Pt requested and given PRN Remeron 7.5mg for sleep at 2244. Pt also requested and given PRN tylenol 650mg at the same time for reported 4/10 moderate tooth pain, lower left side.   "

## 2024-02-22 NOTE — PROGRESS NOTES
02/22/24 0901   Team Meeting   Meeting Type Daily Rounds   Team Members Present   Team Members Present Physician;Nurse;   Physician Team Member Alesha   Nursing Team Member Kev   Care Management Team Member Kimberley   Patient/Family Present   Patient Present No   Patient's Family Present No     Pt med/meal compliant. Visible on the unit, social with select peers. Pt's Lexapro and Melatonin to be increased. Discharge to be determined.

## 2024-02-23 PROCEDURE — 99232 SBSQ HOSP IP/OBS MODERATE 35: CPT | Performed by: PSYCHIATRY & NEUROLOGY

## 2024-02-23 RX ADMIN — HYDROXYZINE HYDROCHLORIDE 75 MG: 25 TABLET ORAL at 22:56

## 2024-02-23 RX ADMIN — MELATONIN TAB 3 MG 6 MG: 3 TAB at 22:56

## 2024-02-23 RX ADMIN — ESCITALOPRAM OXALATE 15 MG: 5 TABLET, FILM COATED ORAL at 08:25

## 2024-02-23 RX ADMIN — HYDROXYZINE HYDROCHLORIDE 100 MG: 50 TABLET, FILM COATED ORAL at 11:38

## 2024-02-23 RX ADMIN — MIRTAZAPINE 7.5 MG: 7.5 TABLET, FILM COATED ORAL at 22:58

## 2024-02-23 RX ADMIN — HYDROXYZINE HYDROCHLORIDE 75 MG: 25 TABLET ORAL at 08:26

## 2024-02-23 RX ADMIN — HYDROXYZINE HYDROCHLORIDE 75 MG: 25 TABLET ORAL at 16:26

## 2024-02-23 NOTE — NURSING NOTE
Patient is visible  on unit, sitting in dayroom watching tv or talking on the phone in the hallway. Patient is calm, cooperative, and compliant with medications and meals. Patient denies SI/HI/AVH. Patient complains of difficulty sleeping and requested/received PRN Remeron 7.5mg PO at 2213, will reassess effectiveness. Patient denies feelings of depression and anxiety. Q 7 minute safety checks maintained.

## 2024-02-23 NOTE — PROGRESS NOTES
Shaquille requested time to consider rehab because he has reservations about giving up cannabis.  We discussed his reservations are normal and most people come into treatment with them. Rehab will give him the opportunity to get to know more about himself. This writer will touch base with him on Monday..

## 2024-02-23 NOTE — PROGRESS NOTES
"Progress Note - Behavioral Health   Shaquille Ackerman 28 y.o. male MRN: [id number]  Unit/Bed#: [facility] 346-02 Encounter: [id number]    Assessment/Plan   Principal Problem:    Major depressive disorder, recurrent episode (HCC)  Active Problems:    Medical clearance for psychiatric admission    Marijuana abuse    Serum total bilirubin elevated      Continue Lexapro 15 mg daily for depression/anxiety  Continue Atarax 75 mg 3 times daily for anxiety  Continue melatonin 6 mg daily at bedtime for insomnia  Continue other medications/PRNs as noted below.  Continue to promote patient participation in therapeutic milieu.  Continue medical management by primary team.  Discharge disposition:  TBD. Patient continues to require inpatient psychiatric admission at this time.   Legal status: 201 voluntary commitment    Subjective:  The patient was evaluated this morning for continuity of care and no acute distress noted throughout the evaluation.  Over the past 24 hours staff noted the patient was visible on the unit, social with select peers, meal/medication compliant.  Per Case management with patient's mother, patient is unable to return to either her home or father's home.  Patient's mother was not amenable to discharging patient to shelter.  Patient complained of difficulty sleeping and received as needed Remeron 7.5 mg at 2213 which was effective.    Today on exam the patient was anxious appearing, guarded and reports that he feels \"very neutral.\"  Patient states that he spoke with his father yesterday and that he might be able to live with him in the future, but not presently.  Patient states that the conversation was okay.  He states that he did not get mad, but he was not happy about it either.  Patient reports that he discussed with Viki about rehab.  Patient reports that he knows that alcohol is bad, but he is not ready to give up cannabis quite yet.  Patient states that he wants to possibly live with another peer on the unit " after being discharged.  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  Patient denies active or passive suicidal or homicidal ideation.  Denies AVH.  No other concerns or complaints noted by patient. No side effects from medication.    Current Medications:  Current Facility-Administered Medications   Medication Dose Route Frequency Provider Last Rate    acetaminophen  650 mg Oral Q6H PRN Zakia Mal, DO      acetaminophen  650 mg Oral Q4H PRN Zakia Mal, DO      acetaminophen  975 mg Oral Q6H PRN Zakia Mal, DO      aluminum-magnesium hydroxide-simethicone  30 mL Oral Q4H PRN Zakia Mal, DO      haloperidol lactate  2.5 mg Intramuscular Q4H PRN Max 4/day Zakia Mal, DO      And    LORazepam  1 mg Intramuscular Q4H PRN Max 4/day Zakia Mal, DO      And    benztropine  0.5 mg Intramuscular Q4H PRN Max 4/day Zakia Mal, DO      haloperidol lactate  5 mg Intramuscular Q4H PRN Max 4/day Zakia Mal, DO      And    LORazepam  2 mg Intramuscular Q4H PRN Max 4/day Zakia Mal, DO      And    benztropine  1 mg Intramuscular Q4H PRN Max 4/day Zakia Mal, DO      benztropine  1 mg Intramuscular Q4H PRN Max 6/day Zakia Mal, DO      benztropine  1 mg Oral Q4H PRN Max 6/day Zakia Mal, DO      hydrOXYzine HCL  50 mg Oral Q6H PRN Max 4/day Zakia Mal, DO      Or    diphenhydrAMINE  50 mg Intramuscular Q6H PRN Zakia Mal, DO      escitalopram  15 mg Oral Daily Kylah Cason, DO      Artificial Tears  1 drop Both Eyes Q3H PRN Zakia Mal, DO      haloperidol  1 mg Oral Q6H PRN Zakia Mal, DO      haloperidol  2.5 mg Oral Q4H PRN Max 4/day Zakia Mal, DO      haloperidol  5 mg Oral Q4H PRN Max 4/day Zakia Mal, DO      hydrOXYzine HCL  100 mg Oral Q6H PRN Max 4/day Zakia Mal, DO      Or    LORazepam  2 mg Intramuscular Q6H PRN Zakia Mal, DO      hydrOXYzine HCL  25 mg Oral Q6H  "PRN Max 4/day Zakia Resendez,       hydrOXYzine HCL  75 mg Oral TID Gregory Bennett MD      melatonin  6 mg Oral HS Kylah Csaon, DO      mirtazapine  7.5 mg Oral HS PRN Gregory Bennett MD      polyethylene glycol  17 g Oral Daily PRN Zakia Resendez,       propranolol  10 mg Oral Q8H PRN Zakia Resendez,       senna-docusate sodium  1 tablet Oral Daily PRN Zakia Resendez, DO         Behavioral Health Medications: all current active meds have been reviewed and continue current psychiatric medications.    Vital signs in last 24 hours:  Temp:  [97.7 °F (36.5 °C)-98.8 °F (37.1 °C)] 97.7 °F (36.5 °C)  HR:  [63-84] 63  Resp:  [16] 16  BP: (113-134)/(62-78) 134/62    Laboratory results:  I have personally reviewed all pertinent laboratory/tests results.  Most Recent Labs:   Lab Results   Component Value Date    WBC 4.42 02/01/2024    RBC 4.88 02/01/2024    HGB 15.3 02/01/2024    HCT 44.4 02/01/2024     02/01/2024    RDW 11.9 02/01/2024    NEUTROABS 2.87 02/01/2024    SODIUM 141 02/07/2024    K 4.0 02/07/2024     02/07/2024    CO2 31 02/07/2024    BUN 20 02/07/2024    CREATININE 1.10 02/07/2024    GLUC 79 02/07/2024    GLUF 79 02/07/2024    CALCIUM 9.4 02/07/2024    AST 12 (L) 02/07/2024    ALT 9 02/07/2024    ALKPHOS 37 02/07/2024    TP 5.9 (L) 02/07/2024    ALB 4.0 02/07/2024    TBILI 0.43 02/07/2024    [id number] 0.968 02/01/2024       Psychiatric Review of Systems:  Behavior over the last 24 hours:  unchanged  Sleep: difficulty falling asleep  Appetite: fair  Medication side effects: No   ROS: all other systems are negative    Mental Status Evaluation:    Appearance:  Overtly appearing  male, dressed in hospital attire, looks stated age, poor hygiene, wearing glasses, standing in the room   Behavior:  calm, bizarre, guarded, intense eye contact   Speech:  normal volume, scant, increased latency of response   Mood:  \"Very neutral\"   Affect:  constricted, anxious   Thought Process: "  goal directed, linear   Associations: concrete associations   Thought Content:  no overt delusions   Perceptual Disturbances: denies auditory or visual hallucinations when asked, but appears distracted, does not appear responding to internal stimuli   Risk Potential: Suicidal ideation -  denies  Homicidal ideation -  denies  Potential for aggression - Not at present   Sensorium:  oriented to person, place, and time/date   Memory:  recent and remote memory grossly intact   Consciousness:  alert and awake   Attention/Concentration: attention span and concentration are age appropriate   Insight:  Limited, but improving   Judgment: limited   Gait/Station: normal gait/station   Motor Activity: no abnormal movements         Progress Toward Goals: progressing slowly    Recommended Treatment:   See above for assessment and plan.     Risks, benefits and possible side effects of Medications:   Risks, benefits, and possible side effects of medications explained to patient and patient verbalizes understanding.      This note has been constructed using a voice recognition system.  There may be translation, syntax, or grammatical errors. If you have any questions, please contact the dictating provider.    Kylah Cason D.O.  Psychiatry Resident, PGY-1  This note was not shared with the patient due to reasonable likelihood of causing patient harm

## 2024-02-23 NOTE — PROGRESS NOTES
02/23/24 0858   Team Meeting   Meeting Type Daily Rounds   Team Members Present   Team Members Present Physician;Nurse;   Physician Team Member Alesha   Nursing Team Member Pawan   Care Management Team Member Kimberley   Patient/Family Present   Patient Present No   Patient's Family Present No     Pt med/meal compliant. Visible on the unit. Social with select peers. Mother advised pt is unable to return to either her home or father's home. Discharge TBD

## 2024-02-23 NOTE — NURSING NOTE
Patient resting comfortably in room. PRN Remeron 7.5mg effective. No further complaints or unmet needs voiced at this time.

## 2024-02-23 NOTE — NURSING NOTE
Pt awake and alert, visible intermittently, social with select peers. Pt appears disheveled and anxious though denies anxiety at this time. Denies SI/HI/AVH at this time. Compliant with scheduled medications and meals, appetite good. Pt reports fair sleep. Remains in behavioral control. Denies any unmet needs. Q7 minute safety checks maintained.

## 2024-02-23 NOTE — PROGRESS NOTES
Talked with patient about the fact it does not look like his family (father or mother) is an option for placement. He is holding out that his father may change his mind. He is still open to the group home. We discussed that this will take months and in the interim another possibility would be to go to rehab and address his alcohol use, kratom, guava and marijuana use. He is still on the fence of giving up marijuana and it was explained that is normal. Rehab will offer him a place to learn more and explore his values and help him make a healthy decision. The end goal would be group home placement. He agreed to think about it and let this writer know.

## 2024-02-23 NOTE — NURSING NOTE
Patient reported to this RN that he feels severely anxious related to a phone call yesterday from his father, and discussion today with therapist whom is encouraging him going to rehab for his history of alcohol abuse.  Patient given 100mg of atarax for severe anxiety.     12:38: Patient reports that 100mg of atarax PO PRN was effective for severe anxiety. Patient reports minimal anxiety.

## 2024-02-23 NOTE — PLAN OF CARE
Problem: Ineffective Coping  Goal: Identifies ineffective coping skills  Outcome: Progressing  Goal: Identifies healthy coping skills  Outcome: Progressing  Goal: Demonstrates healthy coping skills  Outcome: Progressing  Goal: Participates in unit activities  Description: Interventions:  - Provide therapeutic environment   - Provide required programming   - Redirect inappropriate behaviors   Outcome: Progressing  Goal: Patient/Family participate in treatment and DC plans  Description: Interventions:  - Provide therapeutic environment  Outcome: Progressing  Goal: Patient/Family verbalizes awareness of resources  Outcome: Progressing  Goal: Understands least restrictive measures  Description: Interventions:  - Utilize least restrictive behavior  Outcome: Progressing  Goal: Free from restraint events  Description: - Utilize least restrictive measures   - Provide behavioral interventions   - Redirect inappropriate behaviors   Outcome: Progressing     Problem: Depression  Goal: Treatment Goal: Demonstrate behavioral control of depressive symptoms, verbalize feelings of improved mood/affect, and adopt new coping skills prior to discharge  Outcome: Progressing  Goal: Verbalize thoughts and feelings  Description: Interventions:  - Assess and re-assess patient's level of risk   - Engage patient in 1:1 interactions, daily, for a minimum of 15 minutes   - Encourage patient to express feelings, fears, frustrations, hopes   Outcome: Progressing  Goal: Refrain from harming self  Description: Interventions:  - Monitor patient closely, per order   - Supervise medication ingestion, monitor effects and side effects   Outcome: Progressing  Goal: Refrain from isolation  Description: Interventions:  - Develop a trusting relationship   - Encourage socialization   Outcome: Progressing  Goal: Refrain from self-neglect  Outcome: Progressing  Goal: Attend and participate in unit activities, including therapeutic, recreational, and  educational groups  Description: Interventions:  - Provide therapeutic and educational activities daily, encourage attendance and participation, and document same in the medical record   Outcome: Progressing  Goal: Complete daily ADLs, including personal hygiene independently, as able  Description: Interventions:  - Observe, teach, and assist patient with ADLS  -  Monitor and promote a balance of rest/activity, with adequate nutrition and elimination   Outcome: Progressing     Problem: Anxiety  Goal: Anxiety is at manageable level  Description: Interventions:  - Assess and monitor patient's anxiety level.   - Monitor for signs and symptoms (heart palpitations, chest pain, shortness of breath, headaches, nausea, feeling jumpy, restlessness, irritable, apprehensive).   - Collaborate with interdisciplinary team and initiate plan and interventions as ordered.  - [place] patient to unit/surroundings  - Explain treatment plan  - Encourage participation in care  - Encourage verbalization of concerns/fears  - Identify coping mechanisms  - Assist in developing anxiety-reducing skills  - Administer/offer alternative therapies  - Limit or eliminate stimulants  Outcome: Progressing     Problem: Nutrition/Hydration-ADULT  Goal: Nutrient/Hydration intake appropriate for improving, restoring or maintaining nutritional needs  Description: Monitor and assess patient's nutrition/hydration status for malnutrition. Collaborate with interdisciplinary team and initiate plan and interventions as ordered.  Monitor patient's weight and dietary intake as ordered or per policy. Utilize nutrition screening tool and intervene as necessary. Determine patient's food preferences and provide high-protein, high-caloric foods as appropriate.     INTERVENTIONS:  - Monitor oral intake, urinary output, labs, and treatment plans  - Assess nutrition and hydration status and recommend course of action  - Evaluate amount of meals eaten  - Assist patient with  eating if necessary   - Allow adequate time for meals  - Recommend/ encourage appropriate diets, oral nutritional supplements, and vitamin/mineral supplements  - Order, calculate, and assess calorie counts as needed  - Recommend, monitor, and adjust tube feedings and TPN/PPN based on assessed needs  - Assess need for intravenous fluids  - Provide specific nutrition/hydration education as appropriate  - Include patient/family/caregiver in decisions related to nutrition  Outcome: Progressing     Problem: DISCHARGE PLANNING  Goal: Discharge to home or other facility with appropriate resources  Description: INTERVENTIONS:  - Identify barriers to discharge w/patient and caregiver  - Arrange for needed discharge resources and transportation as appropriate  - Identify discharge learning needs (meds, wound care, etc.)  - Arrange for interpretive services to assist at discharge as needed  - Refer to Case Management Department for coordinating discharge planning if the patient needs post-hospital services based on physician/advanced practitioner order or complex needs related to functional status, cognitive ability, or social support system  Outcome: Progressing

## 2024-02-23 NOTE — PLAN OF CARE
Problem: Ineffective Coping  Goal: Identifies ineffective coping skills  Outcome: Progressing  Goal: Identifies healthy coping skills  Outcome: Progressing  Goal: Demonstrates healthy coping skills  Outcome: Progressing  Goal: Participates in unit activities  Description: Interventions:  - Provide therapeutic environment   - Provide required programming   - Redirect inappropriate behaviors   Outcome: Progressing     Problem: Depression  Goal: Verbalize thoughts and feelings  Description: Interventions:  - Assess and re-assess patient's level of risk   - Engage patient in 1:1 interactions, daily, for a minimum of 15 minutes   - Encourage patient to express feelings, fears, frustrations, hopes   Outcome: Progressing  Goal: Attend and participate in unit activities, including therapeutic, recreational, and educational groups  Description: Interventions:  - Provide therapeutic and educational activities daily, encourage attendance and participation, and document same in the medical record   Outcome: Progressing   Patient is active in group exploring his past behaviors that has not supported him and has been practicing more effective coping skills.

## 2024-02-24 PROCEDURE — 99232 SBSQ HOSP IP/OBS MODERATE 35: CPT | Performed by: PSYCHIATRY & NEUROLOGY

## 2024-02-24 RX ADMIN — MIRTAZAPINE 7.5 MG: 7.5 TABLET, FILM COATED ORAL at 21:58

## 2024-02-24 RX ADMIN — HYDROXYZINE HYDROCHLORIDE 75 MG: 25 TABLET ORAL at 21:55

## 2024-02-24 RX ADMIN — ESCITALOPRAM OXALATE 15 MG: 5 TABLET, FILM COATED ORAL at 08:12

## 2024-02-24 RX ADMIN — HYDROXYZINE HYDROCHLORIDE 75 MG: 25 TABLET ORAL at 16:41

## 2024-02-24 RX ADMIN — MELATONIN TAB 3 MG 6 MG: 3 TAB at 21:56

## 2024-02-24 RX ADMIN — HYDROXYZINE HYDROCHLORIDE 75 MG: 25 TABLET ORAL at 08:12

## 2024-02-24 NOTE — NURSING NOTE
Pt denies SI/HI/AH/VH. Pt appears paranoid/anxious. Pt reports anxiety and depression but does not want a PRN at this time. Medication compliant. Pt refused Breakfast. Present in dayroom and milieu but is minimally Social with select peers. Compliant with Lunch. No further concerns as of present. Plan of care ongoing.

## 2024-02-24 NOTE — NURSING NOTE
Pt quiet, pleasant on approach. Received pt in bed reading.pt denies unmet needs and all psych symptoms at this time. Continued q7m checks for safety.    Med compliant.   2368 Prn Remeron given for insomnia

## 2024-02-24 NOTE — NURSING NOTE
Patient is in the community and social with peers. Patient appears anxious, but did not require any PRN medications. Patient is compliant with scheduled medications. Patient c/o difficulty sleeping. PRN remeron 7.5mg was administered. Patient denied SI/AVH at this time. Staff maintained continuous rounding for safety and support.

## 2024-02-24 NOTE — PROGRESS NOTES
"Progress Note - Behavioral Health   Shaquille Ackerman 28 y.o. male MRN: [id number]  Unit/Bed#: [facility] 346-02 Encounter: [id number]      Subjective:  The patient was seen for continuing care and reviewed with nursing. Per nursing, patient has had poor ADLs, anxious, had difficulty sleeping and received Remeron as needed.  Patient was in his room and in bed.  He appeared to be startled and anxious during interview-his eyes were opened wide. He reports feeling \"okay\" today and denies having any issues. Patient denies current suicidal or homicidal ideation.      Current Medications:  Current Facility-Administered Medications   Medication Dose Route Frequency Provider Last Rate    acetaminophen  650 mg Oral Q6H PRN Zakia Mal, DO      acetaminophen  650 mg Oral Q4H PRN Zakia Mal, DO      acetaminophen  975 mg Oral Q6H PRN Zakia Mal, DO      aluminum-magnesium hydroxide-simethicone  30 mL Oral Q4H PRN Zakia Resendez, DO      haloperidol lactate  2.5 mg Intramuscular Q4H PRN Max 4/day Zakia Resendez, DO      And    LORazepam  1 mg Intramuscular Q4H PRN Max 4/day Zakia Mal, DO      And    benztropine  0.5 mg Intramuscular Q4H PRN Max 4/day Zakia Mal, DO      haloperidol lactate  5 mg Intramuscular Q4H PRN Max 4/day Zakia Mal, DO      And    LORazepam  2 mg Intramuscular Q4H PRN Max 4/day Zakia Mal, DO      And    benztropine  1 mg Intramuscular Q4H PRN Max 4/day Zakia Mal, DO      benztropine  1 mg Intramuscular Q4H PRN Max 6/day Zakia Mal, DO      benztropine  1 mg Oral Q4H PRN Max 6/day Zakia Mal, DO      hydrOXYzine HCL  50 mg Oral Q6H PRN Max 4/day Zakia Mal, DO      Or    diphenhydrAMINE  50 mg Intramuscular Q6H PRN Zakia Mal, DO      escitalopram  15 mg Oral Daily Kylah Cason DO      Artificial Tears  1 drop Both Eyes Q3H PRN Zakia Resendez, DO      haloperidol  1 mg Oral Q6H PRN Zakia Mal, DO      haloperidol  2.5 mg Oral Q4H PRN Max 4/day Zakia Mal, DO   " "   haloperidol  5 mg Oral Q4H PRN Max 4/day Zakia Resendez, DO      hydrOXYzine HCL  100 mg Oral Q6H PRN Max 4/day Zakia Resendez DO      Or    LORazepam  2 mg Intramuscular Q6H PRN Zakia Resendez, DO      hydrOXYzine HCL  25 mg Oral Q6H PRN Max 4/day Zakia Resendez, DO      hydrOXYzine HCL  75 mg Oral TID Gregory Bennett MD      melatonin  6 mg Oral HS Kylahrani Santosjordin,       mirtazapine  7.5 mg Oral HS PRN Gregory Bennett MD      polyethylene glycol  17 g Oral Daily PRN Zakia Resendez,       propranolol  10 mg Oral Q8H PRN Zakia Resendez, DO      senna-docusate sodium  1 tablet Oral Daily PRN Zakia Resendez DO           Vital signs in last 24 hours:  Temp:  [97.5 °F (36.4 °C)-98.2 °F (36.8 °C)] 97.5 °F (36.4 °C)  HR:  [70-75] 75  Resp:  [18] 18  BP: (115-123)/(55-58) 123/58    Laboratory results:  I have personally reviewed all pertinent laboratory/tests results.          Mental Status Evaluation:  Appearance:  appears stated age and laying in bed   Behavior:  cooperative and eyes opened wide   Speech:  scant   Mood:  \"Okay\"   Affect:  Anxious   Thought Process:  Poverty of thought   Thought Content:  no delusions elicited   Perceptual Disturbances: No hallucinations verbalized.  Does not appear to be responding to internal stimuli   Risk Potential: Denies suicidal or homicidal ideation   Sensorium:  person, place, and situation   Cognition:  appears grossly intact   Consciousness:  alert and awake   Attention: attention span and concentration were age appropriate   Insight:  limited   Judgment: limited   Gait/Station: not assessed, patient in bed   Motor Activity: no abnormal movements         Assessment/Plan   Principal Problem:    Major depressive disorder, recurrent episode (HCC)  Active Problems:    Medical clearance for psychiatric admission    Marijuana abuse    Serum total bilirubin elevated        Recommended Treatment:   Continue to promote patient participation in therapeutic " milieu.  Continue medical management per medicine.  Continue previously prescribed psychotropic medication regimen; see above.  Continue behavioral health checks q.7 minutes.   Continue vitals per behavioral health unit protocol.  Discharge date per primary team.      Risks, benefits and possible side effects of Medications:   Risks, benefits, and possible side effects of medications have been previously explained to the patient.  No new medication changes today.        Syd Livingston, DO      This note has been constructed using a voice recognition system.    There may be translation, syntax,  or grammatical errors. If you have any questions, please contact the dictating provider.

## 2024-02-24 NOTE — PLAN OF CARE
Problem: Ineffective Coping  Goal: Identifies ineffective coping skills  Outcome: Progressing  Goal: Identifies healthy coping skills  Outcome: Progressing  Goal: Demonstrates healthy coping skills  Outcome: Progressing  Goal: Participates in unit activities  Description: Interventions:  - Provide therapeutic environment   - Provide required programming   - Redirect inappropriate behaviors   Outcome: Progressing  Goal: Patient/Family participate in treatment and DC plans  Description: Interventions:  - Provide therapeutic environment  Outcome: Progressing  Goal: Patient/Family verbalizes awareness of resources  Outcome: Progressing  Goal: Understands least restrictive measures  Description: Interventions:  - Utilize least restrictive behavior  Outcome: Progressing  Goal: Free from restraint events  Description: - Utilize least restrictive measures   - Provide behavioral interventions   - Redirect inappropriate behaviors   Outcome: Progressing     Problem: Depression  Goal: Treatment Goal: Demonstrate behavioral control of depressive symptoms, verbalize feelings of improved mood/affect, and adopt new coping skills prior to discharge  Outcome: Progressing  Goal: Verbalize thoughts and feelings  Description: Interventions:  - Assess and re-assess patient's level of risk   - Engage patient in 1:1 interactions, daily, for a minimum of 15 minutes   - Encourage patient to express feelings, fears, frustrations, hopes   Outcome: Progressing  Goal: Refrain from harming self  Description: Interventions:  - Monitor patient closely, per order   - Supervise medication ingestion, monitor effects and side effects   Outcome: Progressing  Goal: Refrain from isolation  Description: Interventions:  - Develop a trusting relationship   - Encourage socialization   Outcome: Progressing  Goal: Refrain from self-neglect  Outcome: Progressing  Goal: Attend and participate in unit activities, including therapeutic, recreational, and  educational groups  Description: Interventions:  - Provide therapeutic and educational activities daily, encourage attendance and participation, and document same in the medical record   Outcome: Progressing  Goal: Complete daily ADLs, including personal hygiene independently, as able  Description: Interventions:  - Observe, teach, and assist patient with ADLS  -  Monitor and promote a balance of rest/activity, with adequate nutrition and elimination   Outcome: Progressing     Problem: Anxiety  Goal: Anxiety is at manageable level  Description: Interventions:  - Assess and monitor patient's anxiety level.   - Monitor for signs and symptoms (heart palpitations, chest pain, shortness of breath, headaches, nausea, feeling jumpy, restlessness, irritable, apprehensive).   - Collaborate with interdisciplinary team and initiate plan and interventions as ordered.  - [place] patient to unit/surroundings  - Explain treatment plan  - Encourage participation in care  - Encourage verbalization of concerns/fears  - Identify coping mechanisms  - Assist in developing anxiety-reducing skills  - Administer/offer alternative therapies  - Limit or eliminate stimulants  Outcome: Progressing     Problem: Nutrition/Hydration-ADULT  Goal: Nutrient/Hydration intake appropriate for improving, restoring or maintaining nutritional needs  Description: Monitor and assess patient's nutrition/hydration status for malnutrition. Collaborate with interdisciplinary team and initiate plan and interventions as ordered.  Monitor patient's weight and dietary intake as ordered or per policy. Utilize nutrition screening tool and intervene as necessary. Determine patient's food preferences and provide high-protein, high-caloric foods as appropriate.     INTERVENTIONS:  - Monitor oral intake, urinary output, labs, and treatment plans  - Assess nutrition and hydration status and recommend course of action  - Evaluate amount of meals eaten  - Assist patient with  eating if necessary   - Allow adequate time for meals  - Recommend/ encourage appropriate diets, oral nutritional supplements, and vitamin/mineral supplements  - Order, calculate, and assess calorie counts as needed  - Recommend, monitor, and adjust tube feedings and TPN/PPN based on assessed needs  - Assess need for intravenous fluids  - Provide specific nutrition/hydration education as appropriate  - Include patient/family/caregiver in decisions related to nutrition  Outcome: Progressing

## 2024-02-25 PROCEDURE — 99232 SBSQ HOSP IP/OBS MODERATE 35: CPT | Performed by: PSYCHIATRY & NEUROLOGY

## 2024-02-25 RX ADMIN — HYDROXYZINE HYDROCHLORIDE 75 MG: 25 TABLET ORAL at 08:20

## 2024-02-25 RX ADMIN — MIRTAZAPINE 7.5 MG: 7.5 TABLET, FILM COATED ORAL at 21:14

## 2024-02-25 RX ADMIN — HYDROXYZINE HYDROCHLORIDE 75 MG: 25 TABLET ORAL at 21:11

## 2024-02-25 RX ADMIN — ESCITALOPRAM OXALATE 15 MG: 5 TABLET, FILM COATED ORAL at 08:20

## 2024-02-25 RX ADMIN — HYDROXYZINE HYDROCHLORIDE 75 MG: 25 TABLET ORAL at 15:29

## 2024-02-25 RX ADMIN — MELATONIN TAB 3 MG 6 MG: 3 TAB at 21:11

## 2024-02-25 NOTE — NURSING NOTE
Pt denies SI/HI/AVH at this time, appears anxious and paranoid. Endorses mild anxiety and depression, declines PRN intervention at this time. Pt compliant with scheduled medications, declined breakfast this morning. Visible in the milieu intermittently for needs. Social with select peers. Remains in behavioral control. Denies any unmet needs at this time. Q7 minute safety checks maintained.

## 2024-02-25 NOTE — PROGRESS NOTES
"Progress Note - Behavioral Health   Shaquille Ackerman 28 y.o. male MRN: [id number]  Unit/Bed#: [facility] 346-02 Encounter: [id number]      Subjective:  The patient was seen for continuing care and reviewed with nursing. Per nursing, patient has been out for needs, anxious, malodorous, disheveled.  Patient was cooperative during interview.  Reports that he feels tired, states that he took Remeron and melatonin last night for sleep.  Reports his mood is \"neutral\" today.  He appears to be restless.  He reports that he is trying to work on being \"less reactive\". Patient denies current suicidal or homicidal ideation.      Current Medications:  Current Facility-Administered Medications   Medication Dose Route Frequency Provider Last Rate    acetaminophen  650 mg Oral Q6H PRN Zakia Resendez, DO      acetaminophen  650 mg Oral Q4H PRN Zakia Resendez, DO      acetaminophen  975 mg Oral Q6H PRN Zakia Mal, DO      aluminum-magnesium hydroxide-simethicone  30 mL Oral Q4H PRN Zakia Resendez, DO      haloperidol lactate  2.5 mg Intramuscular Q4H PRN Max 4/day Zakia Resendez, DO      And    LORazepam  1 mg Intramuscular Q4H PRN Max 4/day Zakia Resendez, DO      And    benztropine  0.5 mg Intramuscular Q4H PRN Max 4/day Zakia Mal, DO      haloperidol lactate  5 mg Intramuscular Q4H PRN Max 4/day Zakia Resendez, DO      And    LORazepam  2 mg Intramuscular Q4H PRN Max 4/day Zakia Resendez, DO      And    benztropine  1 mg Intramuscular Q4H PRN Max 4/day Zakia Mal, DO      benztropine  1 mg Intramuscular Q4H PRN Max 6/day Zakia Mal, DO      benztropine  1 mg Oral Q4H PRN Max 6/day Zakia Mal, DO      hydrOXYzine HCL  50 mg Oral Q6H PRN Max 4/day Zakia Resendez, DO      Or    diphenhydrAMINE  50 mg Intramuscular Q6H PRN Zakia Resendez, DO      escitalopram  15 mg Oral Daily Kylah Cason,       Artificial Tears  1 drop Both Eyes Q3H PRN Zakia Resendez, DO      haloperidol  1 mg Oral Q6H PRN Zakia Resendez, DO      " "haloperidol  2.5 mg Oral Q4H PRN Max 4/day Zakia Resendez, DO      haloperidol  5 mg Oral Q4H PRN Max 4/day Zakia Resendez, DO      hydrOXYzine HCL  100 mg Oral Q6H PRN Max 4/day Zakia Resendez,       Or    LORazepam  2 mg Intramuscular Q6H PRN Zakia Resendez, DO      hydrOXYzine HCL  25 mg Oral Q6H PRN Max 4/day Zakia Resendez, DO      hydrOXYzine HCL  75 mg Oral TID Gregory Bennett MD      melatonin  6 mg Oral HS Kylah Cason, DO      mirtazapine  7.5 mg Oral HS PRN Gregory Bennett MD      polyethylene glycol  17 g Oral Daily PRN Zakia Resendez, DO      propranolol  10 mg Oral Q8H PRN Zakia Resendez, DO      senna-docusate sodium  1 tablet Oral Daily PRN Zakia Resendez, DO           Vital signs in last 24 hours:  Temp:  [97.9 °F (36.6 °C)-98.7 °F (37.1 °C)] 97.9 °F (36.6 °C)  HR:  [72-80] 72  Resp:  [16-18] 16  BP: (117-121)/(56-62) 121/56    Laboratory results:  I have personally reviewed all pertinent laboratory/tests results.        Mental Status Evaluation:  Appearance:  appears stated age and laying in bed   Behavior:  cooperative and eyes open wide   Speech:  scant   Mood:  \"Neutral\"   Affect:  Anxious   Thought Process:  Poverty of thought   Thought Content:  no delusions elicited   Perceptual Disturbances: Denies auditory or visual hallucinations and Does not appear to be responding to internal stimuli   Risk Potential: Denies suicidal or homicidal ideation   Sensorium:  person, place, and situation   Cognition:  appears grossly intact   Consciousness:  alert and awake   Attention: attention span and concentration were age appropriate   Insight:  limited   Judgment: limited   Gait/Station: not assessed, patient in bed   Motor Activity: no abnormal movements         Assessment/Plan   Principal Problem:    Major depressive disorder, recurrent episode (HCC)  Active Problems:    Medical clearance for psychiatric admission    Marijuana abuse    Serum total bilirubin elevated        Recommended " Treatment:   Continue to promote patient participation in therapeutic milieu.  Continue medical management per medicine.  Continue previously prescribed psychotropic medication regimen; see above.  Continue behavioral health checks q.7 minutes.   Continue vitals per behavioral health unit protocol.  Discharge date per primary team.      Risks, benefits and possible side effects of Medications:   Risks, benefits, and possible side effects of medications have been previously explained to the patient.  No new medication changes today.        Syd Livingston,       This note has been constructed using a voice recognition system.    There may be translation, syntax,  or grammatical errors. If you have any questions, please contact the dictating provider.

## 2024-02-25 NOTE — NURSING NOTE
Patient has been awake, alert, and visible intermittently out in the milieu. Denies any depression, anxiety, a/v hallucinationsesting Patient resting quietly in bed in room and awake.  Patient remain Q 7 min check.

## 2024-02-25 NOTE — PLAN OF CARE
Problem: Depression  Goal: Treatment Goal: Demonstrate behavioral control of depressive symptoms, verbalize feelings of improved mood/affect, and adopt new coping skills prior to discharge  Outcome: Progressing  Goal: Verbalize thoughts and feelings  Description: Interventions:  - Assess and re-assess patient's level of risk   - Engage patient in 1:1 interactions, daily, for a minimum of 15 minutes   - Encourage patient to express feelings, fears, frustrations, hopes   Outcome: Progressing  Goal: Refrain from harming self  Description: Interventions:  - Monitor patient closely, per order   - Supervise medication ingestion, monitor effects and side effects   Outcome: Progressing  Goal: Refrain from isolation  Description: Interventions:  - Develop a trusting relationship   - Encourage socialization   Outcome: Progressing  Goal: Refrain from self-neglect  Outcome: Progressing  Goal: Attend and participate in unit activities, including therapeutic, recreational, and educational groups  Description: Interventions:  - Provide therapeutic and educational activities daily, encourage attendance and participation, and document same in the medical record   Outcome: Progressing  Goal: Complete daily ADLs, including personal hygiene independently, as able  Description: Interventions:  - Observe, teach, and assist patient with ADLS  -  Monitor and promote a balance of rest/activity, with adequate nutrition and elimination   Outcome: Progressing     Problem: Anxiety  Goal: Anxiety is at manageable level  Description: Interventions:  - Assess and monitor patient's anxiety level.   - Monitor for signs and symptoms (heart palpitations, chest pain, shortness of breath, headaches, nausea, feeling jumpy, restlessness, irritable, apprehensive).   - Collaborate with interdisciplinary team and initiate plan and interventions as ordered.  - [place] patient to unit/surroundings  - Explain treatment plan  - Encourage participation in  care  - Encourage verbalization of concerns/fears  - Identify coping mechanisms  - Assist in developing anxiety-reducing skills  - Administer/offer alternative therapies  - Limit or eliminate stimulants  Outcome: Progressing

## 2024-02-26 PROCEDURE — 99232 SBSQ HOSP IP/OBS MODERATE 35: CPT | Performed by: STUDENT IN AN ORGANIZED HEALTH CARE EDUCATION/TRAINING PROGRAM

## 2024-02-26 RX ADMIN — MELATONIN TAB 3 MG 6 MG: 3 TAB at 21:12

## 2024-02-26 RX ADMIN — ACETAMINOPHEN 650 MG: 325 TABLET ORAL at 16:55

## 2024-02-26 RX ADMIN — HYDROXYZINE HYDROCHLORIDE 75 MG: 25 TABLET ORAL at 21:12

## 2024-02-26 RX ADMIN — HYDROXYZINE HYDROCHLORIDE 75 MG: 25 TABLET ORAL at 16:48

## 2024-02-26 RX ADMIN — ESCITALOPRAM OXALATE 15 MG: 5 TABLET, FILM COATED ORAL at 08:18

## 2024-02-26 RX ADMIN — MIRTAZAPINE 7.5 MG: 7.5 TABLET, FILM COATED ORAL at 21:15

## 2024-02-26 RX ADMIN — HYDROXYZINE HYDROCHLORIDE 75 MG: 25 TABLET ORAL at 08:19

## 2024-02-26 NOTE — PLAN OF CARE
Problem: Ineffective Coping  Goal: Participates in unit activities  Description: Interventions:  - Provide therapeutic environment   - Provide required programming   - Redirect inappropriate behaviors   Outcome: Not Progressing   Patient's attendance over the weekend was poor, but he has a tendency to do better over the weekend

## 2024-02-26 NOTE — PLAN OF CARE
Problem: Ineffective Coping  Goal: Identifies ineffective coping skills  Outcome: Progressing  Goal: Identifies healthy coping skills  Outcome: Progressing  Goal: Demonstrates healthy coping skills  Outcome: Progressing  Goal: Participates in unit activities  Description: Interventions:  - Provide therapeutic environment   - Provide required programming   - Redirect inappropriate behaviors   Outcome: Progressing  Goal: Patient/Family participate in treatment and DC plans  Description: Interventions:  - Provide therapeutic environment  Outcome: Progressing  Goal: Patient/Family verbalizes awareness of resources  Outcome: Progressing  Goal: Understands least restrictive measures  Description: Interventions:  - Utilize least restrictive behavior  Outcome: Progressing  Goal: Free from restraint events  Description: - Utilize least restrictive measures   - Provide behavioral interventions   - Redirect inappropriate behaviors   Outcome: Progressing     Problem: Depression  Goal: Treatment Goal: Demonstrate behavioral control of depressive symptoms, verbalize feelings of improved mood/affect, and adopt new coping skills prior to discharge  Outcome: Progressing  Goal: Verbalize thoughts and feelings  Description: Interventions:  - Assess and re-assess patient's level of risk   - Engage patient in 1:1 interactions, daily, for a minimum of 15 minutes   - Encourage patient to express feelings, fears, frustrations, hopes   Outcome: Progressing  Goal: Refrain from harming self  Description: Interventions:  - Monitor patient closely, per order   - Supervise medication ingestion, monitor effects and side effects   Outcome: Progressing  Goal: Refrain from isolation  Description: Interventions:  - Develop a trusting relationship   - Encourage socialization   Outcome: Progressing  Goal: Refrain from self-neglect  Outcome: Progressing  Goal: Attend and participate in unit activities, including therapeutic, recreational, and  educational groups  Description: Interventions:  - Provide therapeutic and educational activities daily, encourage attendance and participation, and document same in the medical record   Outcome: Progressing  Goal: Complete daily ADLs, including personal hygiene independently, as able  Description: Interventions:  - Observe, teach, and assist patient with ADLS  -  Monitor and promote a balance of rest/activity, with adequate nutrition and elimination   Outcome: Progressing      Routine Reassessment

## 2024-02-26 NOTE — PROGRESS NOTES
02/26/24 0942   Team Meeting   Meeting Type Daily Rounds   Team Members Present   Team Members Present Physician;Nurse;   Physician Team Member Alesha   [facility]   Care Management Team Member Kimberley   Patient/Family Present   Patient Present No   Patient's Family Present No     Pt reporting mild anxiety and depression. Med/meal compliant. PRN Remeron for sleep. Discharge TBD.

## 2024-02-26 NOTE — NURSING NOTE
Patient c/o of insomnia , and received  Remeron.   @22:14 patient is sleeping Remeron was effective

## 2024-02-26 NOTE — NURSING NOTE
Pt denies SI/Hi/AH/VH. Pt appears anxious/paranoid at times. Present in dayroom and milieu. Social with select peers. Medication and meal compliant. Pt attended groups. No further concerns as of present. Plan of care ongoing.

## 2024-02-26 NOTE — PROGRESS NOTES
"Progress Note - Behavioral Health   Shaquille Ackerman 28 y.o. male MRN: [id number]  Unit/Bed#: [facility] 346-02 Encounter: [id number]    Assessment/Plan   Principal Problem:    Major depressive disorder, recurrent episode (HCC)  Active Problems:    Medical clearance for psychiatric admission    Marijuana abuse    Serum total bilirubin elevated      Recommended Treatment:   Continue Lexapro 15 mg daily for depression/anxiety  Continue Atarax 75 mg 3 times daily for anxiety  Continue melatonin 6 mg daily at bedtime for insomnia  Continue other medications/PRNs as noted below  Continue medical management by SLIM team.  Continue with group therapy, milieu therapy and occupational therapy.    Continue frequent safety checks and vitals per unit protocol.  Discharge disposition: TBD. Patient is currently unsure about going to rehab.    Case discussed with treatment team.  Risks, benefits and possible side effects of Medications: Risks, benefits, and possible side effects of medications have been explained to the patient, who verbalizes understanding.      ------------------------------------------------------------  Subjective:  The patient was evaluated this morning for continuity of care and no acute distress noted throughout the evaluation.  Over the past 24 hours staff noted the patient was unsure about wanting to go to rehab after discharge. Claims that he still wants to use marijuana where he would be unable to do so in rehab.    Today on exam the patient was feeling \"neutral\" when asked about his mood. The patient reports that last night he did not even try to sleep and was awake till sunrise this morning. He did not even feel the need to sleep last night while also not feeling tired. The patient did note that he took a 45 minute nap today but he still feels no need to lie down or sleep regardless. Patient denied any medication side effects and appetite has been \"ok.\" Patient denies any SI, HI, self-harm thoughts, and " "hallucinations. The patient expressed that he \"doesn't really want rehab right now.\"     Psychiatric Review of Systems:  Behavior over the last 24 hours:  improved  Sleep: insomnia  Appetite: fair  Medication side effects: No   ROS: no complaints, all other systems are negative    Progress Toward Goals: continued improvement    Current Medications:  Current Facility-Administered Medications   Medication Dose Route Frequency Provider Last Rate    acetaminophen  650 mg Oral Q6H PRN Zakia Mal, DO      acetaminophen  650 mg Oral Q4H PRN Zakia Mal, DO      acetaminophen  975 mg Oral Q6H PRN Zakia Mal, DO      aluminum-magnesium hydroxide-simethicone  30 mL Oral Q4H PRN Zakia Mal, DO      haloperidol lactate  2.5 mg Intramuscular Q4H PRN Max 4/day Zakia Resendez, DO      And    LORazepam  1 mg Intramuscular Q4H PRN Max 4/day Zakia Mal, DO      And    benztropine  0.5 mg Intramuscular Q4H PRN Max 4/day Zakia Mal, DO      haloperidol lactate  5 mg Intramuscular Q4H PRN Max 4/day Zakia Resendez, DO      And    LORazepam  2 mg Intramuscular Q4H PRN Max 4/day Zakia Resendez, DO      And    benztropine  1 mg Intramuscular Q4H PRN Max 4/day Zakia Mal, DO      benztropine  1 mg Intramuscular Q4H PRN Max 6/day Zakia Mal, DO      benztropine  1 mg Oral Q4H PRN Max 6/day Zakia Mal, DO      hydrOXYzine HCL  50 mg Oral Q6H PRN Max 4/day Zakia Resendez, DO      Or    diphenhydrAMINE  50 mg Intramuscular Q6H PRN Zakia Resendez, DO      escitalopram  15 mg Oral Daily Kylah Cason, DO      Artificial Tears  1 drop Both Eyes Q3H PRN Zakia Resendez, DO      haloperidol  1 mg Oral Q6H PRN Zakia Mal, DO      haloperidol  2.5 mg Oral Q4H PRN Max 4/day Zakia Aml, DO      haloperidol  5 mg Oral Q4H PRN Max 4/day Zakia Mal, DO      hydrOXYzine HCL  100 mg Oral Q6H PRN Max 4/day Zakia Resendez, DO      Or    LORazepam  2 mg Intramuscular Q6H PRN Zakia Mal, DO      hydrOXYzine HCL  25 " mg Oral Q6H PRN Max 4/day Zakia Resendez DO      hydrOXYzine HCL  75 mg Oral TID Gregory Bennett MD      melatonin  6 mg Oral HS Kylah DO Kamla      mirtazapine  7.5 mg Oral HS PRN Gregory Bennett MD      polyethylene glycol  17 g Oral Daily PRN Zakia Resendez DO      propranolol  10 mg Oral Q8H PRN Zakia Resendez DO      senna-docusate sodium  1 tablet Oral Daily PRN Zakia Resendez DO         Behavioral Health Medications: all current active meds have been reviewed.    Vital signs in last 24 hours:  Temp:  [98.2 °F (36.8 °C)-98.6 °F (37 °C)] 98.2 °F (36.8 °C)  HR:  [69-81] 69  Resp:  [16] 16  BP: (108-124)/(56-60) 124/60    Laboratory results:  I have personally reviewed all pertinent laboratory/tests results.    Mental Status Evaluation:    Appearance:  disheveled, wearing hospital clothes, looks stated age, underweight, bearded   Behavior:  cooperative, calm   Speech:  normal rate, fluent, clear, coherent   Mood:  improved   Affect:  appropriate, reactive   Thought Process:  organized, logical, coherent   Associations: concrete associations   Thought Content:  no overt delusions   Perceptual Disturbances: no auditory hallucinations, no visual hallucinations, denies auditory hallucinations when asked, denies visual hallucinations when asked   Risk Potential: Suicidal ideation - None at present  Homicidal ideation - None at present  Potential for aggression - No   Sensorium:  oriented to person, place, and time/date   Memory:  recent and remote memory grossly intact   Consciousness:  alert and awake   Attention/Concentration: attention span and concentration are improving   Insight:  improving   Judgment: improved   Gait/Station: normal gait/station   Motor Activity: no abnormal movements             Recommended Treatment:   See above for assessment and plan.     Risks, benefits and possible side effects of Medications:   Risks, benefits, and possible side effects of medications explained to  patient and patient verbalizes understanding.      Silvano Ortega MS-3

## 2024-02-27 PROCEDURE — 99232 SBSQ HOSP IP/OBS MODERATE 35: CPT | Performed by: STUDENT IN AN ORGANIZED HEALTH CARE EDUCATION/TRAINING PROGRAM

## 2024-02-27 RX ADMIN — HYDROXYZINE HYDROCHLORIDE 75 MG: 25 TABLET ORAL at 08:51

## 2024-02-27 RX ADMIN — MIRTAZAPINE 7.5 MG: 7.5 TABLET, FILM COATED ORAL at 23:16

## 2024-02-27 RX ADMIN — HYDROXYZINE HYDROCHLORIDE 75 MG: 25 TABLET ORAL at 22:16

## 2024-02-27 RX ADMIN — MELATONIN TAB 3 MG 6 MG: 3 TAB at 22:16

## 2024-02-27 RX ADMIN — HYDROXYZINE HYDROCHLORIDE 75 MG: 25 TABLET ORAL at 16:27

## 2024-02-27 RX ADMIN — ESCITALOPRAM OXALATE 15 MG: 5 TABLET, FILM COATED ORAL at 08:51

## 2024-02-27 NOTE — PROGRESS NOTES
02/27/24 1146   Team Meeting   Meeting Type Daily Rounds   Team Members Present   Team Members Present Physician;Nurse;   Physician Team Member Sabrina   [facility]   Care Management Team Member Dawood   Patient/Family Present   Patient Present No   Patient's Family Present No     Pt is visible on unit, compliant with med/meals. PRN Remeron 7.5 mg for sleep; it was effective.Discharge TBD.

## 2024-02-27 NOTE — NURSING NOTE
Pt visible in dayroom.  Medication and meal compliant. Denies SI/HI/AH/VH. Pt social with select peers. Pt attends group. No further concerns at this moment.

## 2024-02-27 NOTE — NURSING NOTE
Patient remained visible on the unit throughout the evening. Cooperative with routine. Denied any unmet needs. HS medication compliant. Remeron 7.5 mg po prn requested and received at 2115. Patient was observed resting in bed when reassessed at 2215. Remeron 7.5 mg po prn appears effective.

## 2024-02-27 NOTE — PROGRESS NOTES
"Progress Note - Behavioral Health   Shaquille Ackerman 28 y.o. male MRN: [id number]  Unit/Bed#: [facility] 346-02 Encounter: [id number]    Assessment/Plan   Principal Problem:    Major depressive disorder, recurrent episode (HCC)  Active Problems:    Medical clearance for psychiatric admission    Marijuana abuse    Serum total bilirubin elevated      Recommended Treatment:   Continue Lexapro 15 mg daily for depression/anxiety  Continue Atarax 75 mg 3 times daily for anxiety  Continue melatonin 6 mg daily at bedtime for insomnia  Continue other medications/PRNs as noted below.  Continue medical management by SLIM team.  Continue with group therapy, milieu therapy and occupational therapy.    Continue frequent safety checks and vitals per unit protocol.  Discharge disposition:  TBD. Patient is currently unsure about going to rehab; even more so than yesterday. Expressed interest in intermediate house.    Case discussed with treatment team.  Risks, benefits and possible side effects of Medications: Risks, benefits, and possible side effects of medications have been explained to the patient, who verbalizes understanding.      ------------------------------------------------------------  Subjective:  The patient was evaluated this morning for continuity of care and no acute distress noted throughout the evaluation.  Over the past 24 hours staff noted the patient was \"very anxious.\" They also noted that he expressed that he was \"not ready to go to rehab.\"      Today on exam the patient was very guarded with thought blocking and reports that he has been feeling \"frustrated and annoyed\" but not \"progressing to rage\" which he feels has been a help from the medication. The patient noted that he has been \"annoyed with [his] mother\" as she claims she misses him but yet doesn't want him back. His anxiety has been \"okay\" but was expressing worry about how he will feel once his peers end up leaving the unit. He noted that he has been able to talk " "and read because of his peers here. Patient was able to sleep 6 hours but was noting that is little for him and claims his sleep is only \"okay.\" Patient denies any medication side effects. Patient also denies any homicidal ideations and any hallucinations. However, when talking about suicidal ideations he claimes that he just wishes \"things were different\" but reassured that he doesn't have a plan for hurting himself or others. Patient expressed worries about discharge and where he might end up after the unit. He noted that he has enjoyed his time here and would ideally stay here. He just ask that he wants to \"know the services\" of the place that he will be discharged to.    Psychiatric Review of Systems:  Behavior over the last 24 hours:  improved  Sleep: slept better, compared to the night before but wants to sleep more still.  Appetite: fair  Medication side effects: No   ROS: all other systems are negative    Progress Toward Goals: progressing    Current Medications:  Current Facility-Administered Medications   Medication Dose Route Frequency Provider Last Rate    acetaminophen  650 mg Oral Q6H PRN Zakia Mal, DO      acetaminophen  650 mg Oral Q4H PRN Zakia Mal, DO      acetaminophen  975 mg Oral Q6H PRN Zakia Mal, DO      aluminum-magnesium hydroxide-simethicone  30 mL Oral Q4H PRN Zakia Mal, DO      haloperidol lactate  2.5 mg Intramuscular Q4H PRN Max 4/day Zakia Mal, DO      And    LORazepam  1 mg Intramuscular Q4H PRN Max 4/day Zakia Mal, DO      And    benztropine  0.5 mg Intramuscular Q4H PRN Max 4/day Zakia Mal, DO      haloperidol lactate  5 mg Intramuscular Q4H PRN Max 4/day Zakia Mal, DO      And    LORazepam  2 mg Intramuscular Q4H PRN Max 4/day Zakia Mal, DO      And    benztropine  1 mg Intramuscular Q4H PRN Max 4/day Zakia Mal, DO      benztropine  1 mg Intramuscular Q4H PRN Max 6/day Zakia Mal, DO      benztropine  1 mg Oral Q4H PRN Max 6/day " Zakia Resendez, DO      hydrOXYzine HCL  50 mg Oral Q6H PRN Max 4/day Zakia Resendez, DO      Or    diphenhydrAMINE  50 mg Intramuscular Q6H PRN Zakia Resendez, DO      escitalopram  15 mg Oral Daily Kylah Cason, DO      Artificial Tears  1 drop Both Eyes Q3H PRN Zakia Resendez, DO      haloperidol  1 mg Oral Q6H PRN Zakia Resendez, DO      haloperidol  2.5 mg Oral Q4H PRN Max 4/day Zakia Resendez, DO      haloperidol  5 mg Oral Q4H PRN Max 4/day Zakia Resendez, DO      hydrOXYzine HCL  100 mg Oral Q6H PRN Max 4/day Zakia Resendez, DO      Or    LORazepam  2 mg Intramuscular Q6H PRN Zakia Resendez, DO      hydrOXYzine HCL  25 mg Oral Q6H PRN Max 4/day Zakia Resendez, DO      hydrOXYzine HCL  75 mg Oral TID Gregory Bennett MD      melatonin  6 mg Oral HS Kylah Cason, DO      mirtazapine  7.5 mg Oral HS PRN Gregory Bennett MD      polyethylene glycol  17 g Oral Daily PRN Zakia Resendez, DO      propranolol  10 mg Oral Q8H PRN Zakia Resendez, DO      senna-docusate sodium  1 tablet Oral Daily PRN Zakia Resendez, DO         Behavioral Health Medications: all current active meds have been reviewed.    Vital signs in last 24 hours:  Temp:  [97.6 °F (36.4 °C)-99 °F (37.2 °C)] 97.6 °F (36.4 °C)  HR:  [80] 80  Resp:  [16] 16  BP: (104-113)/(55-63) 104/55    Laboratory results:  I have personally reviewed all pertinent laboratory/tests results.    Mental Status Evaluation:    Appearance:  disheveled, wearing hospital clothes, underweight, bearded, inadequate grooming   Behavior:  cooperative, calm, guarded, slow responses   Speech:  decreased rate, slow, increased latency of response, word finding difficulty where he was clearly taking a lot of time to think of what to say.   Mood:  anxious, annoyed and frustrated   Affect:  appropriate, mood-congruent   Thought Process:  slowing of thoughts, negative thinking,     Associations: concrete associations   Thought Content:  paranoid ideation, negative thoughts,  ruminating thoughts, preoccupied   Perceptual Disturbances: no auditory hallucinations, denies auditory hallucinations when asked, no visual hallucinations, denies visual hallucinations when asked   Risk Potential: Suicidal ideation - No active suicidal ideation, as explained above.  Homicidal ideation - None at present  Potential for aggression - Not at present   Sensorium:  oriented to person, place, and time/date   Memory:  recent and remote memory grossly intact   Consciousness:  alert and awake   Attention/Concentration: attention span and concentration are age appropriate   Insight:  improving   Judgment: improving   Gait/Station: normal gait/station   Motor Activity: no abnormal movements             Recommended Treatment:   See above for assessment and plan.     Risks, benefits and possible side effects of Medications:   Risks, benefits, and possible side effects of medications explained to patient and patient verbalizes understanding.  .    Silvano Ortega MS-3

## 2024-02-27 NOTE — PLAN OF CARE
Problem: Ineffective Coping  Goal: Identifies ineffective coping skills  Outcome: Progressing  Goal: Identifies healthy coping skills  Outcome: Progressing  Goal: Demonstrates healthy coping skills  Outcome: Progressing  Goal: Participates in unit activities  Description: Interventions:  - Provide therapeutic environment   - Provide required programming   - Redirect inappropriate behaviors   Outcome: Progressing  Goal: Patient/Family verbalizes awareness of resources  Outcome: Progressing  Goal: Understands least restrictive measures  Description: Interventions:  - Utilize least restrictive behavior  Outcome: Progressing  Goal: Free from restraint events  Description: - Utilize least restrictive measures   - Provide behavioral interventions   - Redirect inappropriate behaviors   Outcome: Progressing     Problem: Depression  Goal: Treatment Goal: Demonstrate behavioral control of depressive symptoms, verbalize feelings of improved mood/affect, and adopt new coping skills prior to discharge  Outcome: Progressing  Goal: Verbalize thoughts and feelings  Description: Interventions:  - Assess and re-assess patient's level of risk   - Engage patient in 1:1 interactions, daily, for a minimum of 15 minutes   - Encourage patient to express feelings, fears, frustrations, hopes   Outcome: Progressing  Goal: Refrain from harming self  Description: Interventions:  - Monitor patient closely, per order   - Supervise medication ingestion, monitor effects and side effects   Outcome: Progressing  Goal: Refrain from isolation  Description: Interventions:  - Develop a trusting relationship   - Encourage socialization   Outcome: Progressing  Goal: Refrain from self-neglect  Outcome: Progressing  Goal: Attend and participate in unit activities, including therapeutic, recreational, and educational groups  Description: Interventions:  - Provide therapeutic and educational activities daily, encourage attendance and participation, and  document same in the medical record   Outcome: Not Progressing  Goal: Complete daily ADLs, including personal hygiene independently, as able  Description: Interventions:  - Observe, teach, and assist patient with ADLS  -  Monitor and promote a balance of rest/activity, with adequate nutrition and elimination   Outcome: Not Progressing     Problem: Anxiety  Goal: Anxiety is at manageable level  Description: Interventions:  - Assess and monitor patient's anxiety level.   - Monitor for signs and symptoms (heart palpitations, chest pain, shortness of breath, headaches, nausea, feeling jumpy, restlessness, irritable, apprehensive).   - Collaborate with interdisciplinary team and initiate plan and interventions as ordered.  - [place] patient to unit/surroundings  - Explain treatment plan  - Encourage participation in care  - Encourage verbalization of concerns/fears  - Identify coping mechanisms  - Assist in developing anxiety-reducing skills  - Administer/offer alternative therapies  - Limit or eliminate stimulants  Outcome: Progressing

## 2024-02-28 PROCEDURE — 99232 SBSQ HOSP IP/OBS MODERATE 35: CPT | Performed by: STUDENT IN AN ORGANIZED HEALTH CARE EDUCATION/TRAINING PROGRAM

## 2024-02-28 RX ORDER — MIRTAZAPINE 7.5 MG/1
7.5 TABLET, FILM COATED ORAL
Status: DISCONTINUED | OUTPATIENT
Start: 2024-02-28 | End: 2024-03-11 | Stop reason: HOSPADM

## 2024-02-28 RX ADMIN — MELATONIN TAB 3 MG 6 MG: 3 TAB at 21:49

## 2024-02-28 RX ADMIN — HYDROXYZINE HYDROCHLORIDE 75 MG: 25 TABLET ORAL at 16:25

## 2024-02-28 RX ADMIN — ESCITALOPRAM OXALATE 15 MG: 5 TABLET, FILM COATED ORAL at 08:39

## 2024-02-28 RX ADMIN — HYDROXYZINE HYDROCHLORIDE 75 MG: 25 TABLET ORAL at 21:49

## 2024-02-28 RX ADMIN — ACETAMINOPHEN 975 MG: 325 TABLET ORAL at 21:55

## 2024-02-28 RX ADMIN — MIRTAZAPINE 7.5 MG: 7.5 TABLET, FILM COATED ORAL at 21:48

## 2024-02-28 RX ADMIN — HYDROXYZINE HYDROCHLORIDE 75 MG: 25 TABLET ORAL at 08:39

## 2024-02-28 NOTE — NURSING NOTE
Patient is visible on and off in the milieu - he is dishevelled and appears anxious. He prefers to take his HS medications in later pm.  He denies S.I.H.I.A/H V/H

## 2024-02-28 NOTE — PLAN OF CARE
Problem: Ineffective Coping  Goal: Identifies ineffective coping skills  Outcome: Not Progressing  Goal: Identifies healthy coping skills  Outcome: Not Progressing  Goal: Demonstrates healthy coping skills  Outcome: Not Progressing  Goal: Participates in unit activities  Description: Interventions:  - Provide therapeutic environment   - Provide required programming   - Redirect inappropriate behaviors   Outcome: Not Progressing  Goal: Free from restraint events  Description: - Utilize least restrictive measures   - Provide behavioral interventions   - Redirect inappropriate behaviors   Outcome: Progressing     Problem: Depression  Goal: Treatment Goal: Demonstrate behavioral control of depressive symptoms, verbalize feelings of improved mood/affect, and adopt new coping skills prior to discharge  Outcome: Progressing  Goal: Verbalize thoughts and feelings  Description: Interventions:  - Assess and re-assess patient's level of risk   - Engage patient in 1:1 interactions, daily, for a minimum of 15 minutes   - Encourage patient to express feelings, fears, frustrations, hopes   Outcome: Not Progressing  Goal: Refrain from harming self  Description: Interventions:  - Monitor patient closely, per order   - Supervise medication ingestion, monitor effects and side effects   Outcome: Progressing  Goal: Refrain from isolation  Description: Interventions:  - Develop a trusting relationship   - Encourage socialization   Outcome: Not Progressing  Goal: Refrain from self-neglect  Outcome: Not Progressing  Goal: Attend and participate in unit activities, including therapeutic, recreational, and educational groups  Description: Interventions:  - Provide therapeutic and educational activities daily, encourage attendance and participation, and document same in the medical record   Outcome: Progressing  Goal: Complete daily ADLs, including personal hygiene independently, as able  Description: Interventions:  - Observe, teach, and  assist patient with ADLS  -  Monitor and promote a balance of rest/activity, with adequate nutrition and elimination   Outcome: Progressing     Problem: Anxiety  Goal: Anxiety is at manageable level  Description: Interventions:  - Assess and monitor patient's anxiety level.   - Monitor for signs and symptoms (heart palpitations, chest pain, shortness of breath, headaches, nausea, feeling jumpy, restlessness, irritable, apprehensive).   - Collaborate with interdisciplinary team and initiate plan and interventions as ordered.  - [place] patient to unit/surroundings  - Explain treatment plan  - Encourage participation in care  - Encourage verbalization of concerns/fears  - Identify coping mechanisms  - Assist in developing anxiety-reducing skills  - Administer/offer alternative therapies  - Limit or eliminate stimulants  Outcome: Progressing

## 2024-02-28 NOTE — PROGRESS NOTES
"Progress Note - Behavioral Health   Shaquille Ackerman 28 y.o. male MRN: [id number]  Unit/Bed#: [facility] 346-02 Encounter: [id number]    Assessment/Plan   Principal Problem:    Major depressive disorder, recurrent episode (HCC)  Active Problems:    Medical clearance for psychiatric admission    Marijuana abuse    Serum total bilirubin elevated      Recommended Treatment:   Continue Lexapro 15 mg daily for depression/anxiety  Continue Atarax 75 mg 3 times daily for anxiety  Schedule Remron 7.5 mg nightly for depression and weight gain  Possible dose increase  Continue melatonin 6 mg nightly for insomnia  Continue other medications/PRNs as noted below.  Continue medical management by SLIM team.  Continue with group therapy, milieu therapy and occupational therapy.    Continue frequent safety checks and vitals per unit protocol.  Discharge disposition:  TBD. Discussion for disposition in a group home was established.    Case discussed with treatment team.  Risks, benefits and possible side effects of Medications: Risks, benefits, and possible side effects of medications have been explained to the patient, who verbalizes understanding.      ------------------------------------------------------------  Subjective:  The patient was evaluated this morning for continuity of care and no acute distress noted throughout the evaluation.  Over the past 24 hours staff noted the patient was still worried about disposition and all the discussion about the rehab and senior care house. Remron was effective for patient yesterday and talks of scheduling it were started.    Today on exam the patient was doing \"alright\" and reports that he feels \"better than yesterday.\" When asked about his depression and anxiety he noted that \"nothing drastic\" has happened and feels that it has been stable recently. Patient claimed that his sleep \"wasn't great\" but then continued to note that he had 6 or 7 hours of sleep. He added that the door was open instead of " "slightly cracked which affected his sleep. Appetite is improving and is doing \"alright\" according to him. When asked about medications, Shaquille endorses improvement with atarax dosing with no side effects to any medications that he is aware of. Shaquille continues to deny suicidal ideations, homicidal ideations, and any hallucinations. Patient expressed confusion and wanted clarification of what a group home was in comparison to a senior care house. After discussion, patient expressed interest and greater comfort with a group home and understood difficulty with insurance. Further discussion will be had in this regard.    Psychiatric Review of Systems:  Behavior over the last 24 hours:  improved  Sleep: slept better  Appetite: fair  Medication side effects: No   ROS: all other systems are negative    Progress Toward Goals: progressing    Current Medications:  Current Facility-Administered Medications   Medication Dose Route Frequency Provider Last Rate    acetaminophen  650 mg Oral Q6H PRN Zakia Mal, DO      acetaminophen  650 mg Oral Q4H PRN Zakia Mal, DO      acetaminophen  975 mg Oral Q6H PRN Zakia Mal, DO      aluminum-magnesium hydroxide-simethicone  30 mL Oral Q4H PRN Zakia Mal, DO      haloperidol lactate  2.5 mg Intramuscular Q4H PRN Max 4/day Zakia Mal, DO      And    LORazepam  1 mg Intramuscular Q4H PRN Max 4/day Zakia Mal, DO      And    benztropine  0.5 mg Intramuscular Q4H PRN Max 4/day Zakia Mal, DO      haloperidol lactate  5 mg Intramuscular Q4H PRN Max 4/day Zakia Mal, DO      And    LORazepam  2 mg Intramuscular Q4H PRN Max 4/day Zakia Mal, DO      And    benztropine  1 mg Intramuscular Q4H PRN Max 4/day Zakia Mal, DO      benztropine  1 mg Intramuscular Q4H PRN Max 6/day Zakia Mal, DO      benztropine  1 mg Oral Q4H PRN Max 6/day Zakia Mal, DO      hydrOXYzine HCL  50 mg Oral Q6H PRN Max 4/day Zakia Mal, DO      Or    diphenhydrAMINE  50 mg " Intramuscular Q6H PRN Zakia Mal, DO      escitalopram  15 mg Oral Daily Kylah Cason, DO      Artificial Tears  1 drop Both Eyes Q3H PRN Zakia Resendez, DO      haloperidol  1 mg Oral Q6H PRN Zakia Resendez, DO      haloperidol  2.5 mg Oral Q4H PRN Max 4/day Zakia Resendez, DO      haloperidol  5 mg Oral Q4H PRN Max 4/day Zakia Resendez, DO      hydrOXYzine HCL  100 mg Oral Q6H PRN Max 4/day Zakia Resendez, DO      Or    LORazepam  2 mg Intramuscular Q6H PRN Zakia Resendze, DO      hydrOXYzine HCL  25 mg Oral Q6H PRN Max 4/day Zakia Resendez, DO      hydrOXYzine HCL  75 mg Oral TID Gregory Bennett MD      melatonin  6 mg Oral HS Kylah Cason, DO      mirtazapine  7.5 mg Oral HS PRN Gregory Bennett MD      polyethylene glycol  17 g Oral Daily PRN Zakia Resendez, DO      propranolol  10 mg Oral Q8H PRN Zakia Resendez, DO      senna-docusate sodium  1 tablet Oral Daily PRN Zakia Resendez, DO         Behavioral Health Medications: all current active meds have been reviewed.    Vital signs in last 24 hours:  Temp:  [97.5 °F (36.4 °C)-98.2 °F (36.8 °C)] 97.5 °F (36.4 °C)  HR:  [62-77] 62  Resp:  [16] 16  BP: (123-128)/(56-58) 128/58    Laboratory results:  I have personally reviewed all pertinent laboratory/tests results.    Mental Status Evaluation:    Appearance:  dressed in hospital attire, looks older than stated age, underweight, bearded   Behavior:  pleasant, cooperative, calm, guarded, responds to redirection   Speech:  normal volume, fluent, clear, coherent, decreased rate, delayed   Mood:  Frustrated and annoyed   Affect:  reactive, mood-congruent   Thought Process:  logical, coherent, goal directed, concrete   Associations: intact associations, improved   Thought Content:  ruminating thoughts, preoccupied, however improved   Perceptual Disturbances: no auditory hallucinations, denies auditory hallucinations when asked, no visual hallucinations, denies visual hallucinations when asked   Risk  Potential: Suicidal ideation - None at present  Homicidal ideation - None at present  Potential for aggression - Not at present   Sensorium:  oriented to person, place, and time/date   Memory:  recent and remote memory grossly intact   Consciousness:  alert and awake   Attention/Concentration: attention span and concentration are improving   Insight:  improved   Judgment: improved   Gait/Station: normal gait/station   Motor Activity: no abnormal movements             Recommended Treatment:   See above for assessment and plan.     Risks, benefits and possible side effects of Medications:   Risks, benefits, and possible side effects of medications explained to patient and patient verbalizes understanding.      Silvano Ortega MS-3

## 2024-02-28 NOTE — PROGRESS NOTES
02/28/24 0921   Team Meeting   Meeting Type Daily Rounds   Team Members Present   Team Members Present Physician;Nurse;   Physician Team Member Sabrina   [facility] Kimberley   Patient/Family Present   Patient Present No   Patient's Family Present No     Pt med/meal compliant. Remeron to be scheduled instead of PRN. Pt visible on the unit. Social with select peers.

## 2024-02-28 NOTE — NURSING NOTE
Pt observed in dayroom socializing with peers, mostly isolative. Med and meal compliant.  Pt denies SI/HI/AH/VH. Pt continues to appears anxious. Pt is pleasant. Pt attended group. No further concerns at the moment.

## 2024-02-28 NOTE — PLAN OF CARE
Problem: Ineffective Coping  Goal: Identifies healthy coping skills  Outcome: Progressing  Goal: Demonstrates healthy coping skills  Outcome: Progressing  Goal: Participates in unit activities  Description: Interventions:  - Provide therapeutic environment   - Provide required programming   - Redirect inappropriate behaviors   Outcome: Progressing   Patient attends groups and uses his groups to identify his feelings, uncertainties and explores healthier options.

## 2024-02-28 NOTE — NURSING NOTE
Patient fell asleep around 0030 am and has been sleeping for the remainder of the shift.  Breathing adequately.  No distress observed.  Offers no current complaints.

## 2024-02-29 PROCEDURE — 99232 SBSQ HOSP IP/OBS MODERATE 35: CPT | Performed by: STUDENT IN AN ORGANIZED HEALTH CARE EDUCATION/TRAINING PROGRAM

## 2024-02-29 RX ADMIN — HYDROXYZINE HYDROCHLORIDE 75 MG: 25 TABLET ORAL at 21:29

## 2024-02-29 RX ADMIN — HYDROXYZINE HYDROCHLORIDE 75 MG: 25 TABLET ORAL at 08:47

## 2024-02-29 RX ADMIN — MIRTAZAPINE 7.5 MG: 7.5 TABLET, FILM COATED ORAL at 21:29

## 2024-02-29 RX ADMIN — HYDROXYZINE HYDROCHLORIDE 75 MG: 25 TABLET ORAL at 17:09

## 2024-02-29 RX ADMIN — ESCITALOPRAM OXALATE 15 MG: 5 TABLET, FILM COATED ORAL at 08:47

## 2024-02-29 RX ADMIN — HALOPERIDOL 1 MG: 1 TABLET ORAL at 19:44

## 2024-02-29 RX ADMIN — MELATONIN TAB 3 MG 6 MG: 3 TAB at 21:29

## 2024-02-29 NOTE — NURSING NOTE
Patient soft spoken with flat effect, denies psychiatric symptoms. Visible in dayroom watching tv through out evening.Compliant with medication and unit routine.

## 2024-02-29 NOTE — PROGRESS NOTES
02/29/24 0928   Team Meeting   Meeting Type Daily Rounds   Team Members Present   Team Members Present Physician;Nurse;   Physician Team Member Sabrina   [facility] Member Kimberley   Patient/Family Present   Patient Present No   Patient's Family Present No     Med/meal compliant. Visible on the unit. Social with select peers. Discharge to be determined.

## 2024-02-29 NOTE — PROGRESS NOTES
"Progress Note - Behavioral Health   Shaquille Ackerman 28 y.o. male MRN: [id number]  Unit/Bed#: [facility] 346-02 Encounter: [id number]    Assessment/Plan   Principal Problem:    Major depressive disorder, recurrent episode (HCC)  Active Problems:    Medical clearance for psychiatric admission    Marijuana abuse    Serum total bilirubin elevated      Recommended Treatment:   Continue Lexapro 15 mg daily for depression/anxiety  Continue Atarax 75 mg 3 times daily for anxiety  Continue Remron 7.5 mg nightly for depression and weight gain  Continue melatonin 6 mg nightly for insomnia  Continue other medications/PRNs as noted below.  Continue medical management by SLIM team.  Continue with group therapy, milieu therapy and occupational therapy.    Continue frequent safety checks and vitals per unit protocol.  Discharge disposition:  TBD. Discussion for disposition in a rehab facility was started.    Case discussed with treatment team.  Risks, benefits and possible side effects of Medications: Risks, benefits, and possible side effects of medications have been explained to the patient, who verbalizes understanding.      Legal status: 201  ------------------------------------------------------------  Subjective:  The patient was evaluated this morning for continuity of care and no acute distress noted throughout the evaluation.  Over the past 24 hours staff noted the patient was medication and meal compliant. Staff found that he is not eligible for CRR and the best option for him is probably rehab which we will need to have a conversation about with him.      Today on exam the patient was feeling \"neutral\" mood-wise and reports improvement since admission. Expressed that he was trying to \"call father for [his] birthday.\" He is also worried about the reactivation with his phone which is fully controlled by his mother. In terms of his depression and anxiety he noted that these feeling have been unchanged within the last 24 hours. Sleep " "was described as \"okay\" but with improvement where he claims he got 7 hours of decent sleep overnight. When asked about his feelings of annoyance and frustration, he says that he is just \"not quite as reactive\" meaning that he does not snap as quickly. The patient continues to deny any homicidal ideation, suicidal ideation, or any hallucinations.     Psychiatric Review of Systems:  Behavior over the last 24 hours:  improved  Sleep: improved  Appetite: fair  Medication side effects: No   ROS: all other systems are negative    Progress Toward Goals: progressing    Current Medications:  Current Facility-Administered Medications   Medication Dose Route Frequency Provider Last Rate    acetaminophen  650 mg Oral Q6H PRN Zakia Mal, DO      acetaminophen  650 mg Oral Q4H PRN Zakiaflores Resendez, DO      acetaminophen  975 mg Oral Q6H PRN Zakiaflores Resendez, DO      aluminum-magnesium hydroxide-simethicone  30 mL Oral Q4H PRN Zakia Resendez, DO      haloperidol lactate  2.5 mg Intramuscular Q4H PRN Max 4/day Zakia Resendez, DO      And    LORazepam  1 mg Intramuscular Q4H PRN Max 4/day Zakia Resendez, DO      And    benztropine  0.5 mg Intramuscular Q4H PRN Max 4/day Zakiaflores Resendez, DO      haloperidol lactate  5 mg Intramuscular Q4H PRN Max 4/day Zakiaflores Resendez, DO      And    LORazepam  2 mg Intramuscular Q4H PRN Max 4/day Zakiaflores Resendez, DO      And    benztropine  1 mg Intramuscular Q4H PRN Max 4/day Zakiaflores Resendez, DO      benztropine  1 mg Intramuscular Q4H PRN Max 6/day Zakiaflores Resendez, DO      benztropine  1 mg Oral Q4H PRN Max 6/day Zakiaflores Resendez, DO      hydrOXYzine HCL  50 mg Oral Q6H PRN Max 4/day Zakia Resendez, DO      Or    diphenhydrAMINE  50 mg Intramuscular Q6H PRN Zakia Resendez, DO      escitalopram  15 mg Oral Daily Kylah Cason, DO      Artificial Tears  1 drop Both Eyes Q3H PRN Zakia Resendez, DO      haloperidol  1 mg Oral Q6H PRN Zakia Resendez, DO      haloperidol  2.5 mg Oral Q4H PRN Max 4/day Zakia" Mal, DO      haloperidol  5 mg Oral Q4H PRN Max 4/day Zakia Resendez, DO      hydrOXYzine HCL  100 mg Oral Q6H PRN Max 4/day Zakia Resendez,       Or    LORazepam  2 mg Intramuscular Q6H PRN Zakia Resendez, DO      hydrOXYzine HCL  25 mg Oral Q6H PRN Max 4/day Zakia Resendez, DO      hydrOXYzine HCL  75 mg Oral TID Gregory Bennett MD      melatonin  6 mg Oral HS Kylah Santosach, DO      mirtazapine  7.5 mg Oral HS PRN Gregory Bennett MD      mirtazapine  7.5 mg Oral HS Gregory Bennett MD      polyethylene glycol  17 g Oral Daily PRN Zakia Resendez, DO      propranolol  10 mg Oral Q8H PRN Zakia Resendez, DO      senna-docusate sodium  1 tablet Oral Daily PRN Zakia Resendez, DO         Behavioral Health Medications: all current active meds have been reviewed.    Vital signs in last 24 hours:  Temp:  [97.4 °F (36.3 °C)-98.3 °F (36.8 °C)] 97.4 °F (36.3 °C)  HR:  [60-82] 60  Resp:  [16] 16  BP: (133-137)/(58-62) 133/62    Laboratory results:  I have personally reviewed all pertinent laboratory/tests results.    Mental Status Evaluation:    Appearance:  casually dressed, dressed appropriately, marginal hygiene, looks stated age, underweight, bearded   Behavior:  cooperative, calm, guarded   Speech:  normal volume, fluent, clear, slow, delayed, soft   Mood:  improved   Affect:  blunted, mood-congruent   Thought Process:  logical, coherent, goal directed, slowing of thoughts, concrete   Associations: intact associations   Thought Content:  no overt delusions   Perceptual Disturbances: no auditory hallucinations, denies auditory hallucinations when asked, no visual hallucinations, denies visual hallucinations when asked   Risk Potential: Suicidal ideation - None at present  Homicidal ideation - None at present  Potential for aggression - No   Sensorium:  oriented to person, place, and time/date   Memory:  recent and remote memory grossly intact   Consciousness:  alert and awake   Attention/Concentration:  attention span and concentration are age appropriate   Insight:  fair   Judgment: fair   Gait/Station: normal gait/station   Motor Activity: no abnormal movements             Recommended Treatment:   See above for assessment and plan.     Risks, benefits and possible side effects of Medications:   Risks, benefits, and possible side effects of medications explained to patient and patient verbalizes understanding.      Silvano Ortega MS-3

## 2024-02-29 NOTE — NURSING NOTE
Pt denies any SI/HI/AH/VH. Pt visable in dayroom. Pt is social with select peers. Pt appears anxious. Pt is med and meal compliant. Pt attends group. Pt has no other concerns at the moment.

## 2024-02-29 NOTE — PLAN OF CARE
Problem: Ineffective Coping  Goal: Identifies ineffective coping skills  Outcome: Progressing  Goal: Identifies healthy coping skills  Outcome: Progressing  Goal: Demonstrates healthy coping skills  Outcome: Progressing  Goal: Participates in unit activities  Description: Interventions:  - Provide therapeutic environment   - Provide required programming   - Redirect inappropriate behaviors   Outcome: Progressing  Goal: Patient/Family participate in treatment and DC plans  Description: Interventions:  - Provide therapeutic environment  Outcome: Progressing  Goal: Patient/Family verbalizes awareness of resources  Outcome: Progressing  Goal: Understands least restrictive measures  Description: Interventions:  - Utilize least restrictive behavior  Outcome: Progressing  Goal: Free from restraint events  Description: - Utilize least restrictive measures   - Provide behavioral interventions   - Redirect inappropriate behaviors   Outcome: Progressing     Problem: Depression  Goal: Treatment Goal: Demonstrate behavioral control of depressive symptoms, verbalize feelings of improved mood/affect, and adopt new coping skills prior to discharge  Outcome: Progressing  Goal: Verbalize thoughts and feelings  Description: Interventions:  - Assess and re-assess patient's level of risk   - Engage patient in 1:1 interactions, daily, for a minimum of 15 minutes   - Encourage patient to express feelings, fears, frustrations, hopes   Outcome: Progressing  Goal: Refrain from harming self  Description: Interventions:  - Monitor patient closely, per order   - Supervise medication ingestion, monitor effects and side effects   Outcome: Progressing  Goal: Refrain from isolation  Description: Interventions:  - Develop a trusting relationship   - Encourage socialization   Outcome: Progressing  Goal: Refrain from self-neglect  Outcome: Progressing  Goal: Attend and participate in unit activities, including therapeutic, recreational, and  educational groups  Description: Interventions:  - Provide therapeutic and educational activities daily, encourage attendance and participation, and document same in the medical record   Outcome: Progressing  Goal: Complete daily ADLs, including personal hygiene independently, as able  Description: Interventions:  - Observe, teach, and assist patient with ADLS  -  Monitor and promote a balance of rest/activity, with adequate nutrition and elimination   Outcome: Progressing     Problem: Anxiety  Goal: Anxiety is at manageable level  Description: Interventions:  - Assess and monitor patient's anxiety level.   - Monitor for signs and symptoms (heart palpitations, chest pain, shortness of breath, headaches, nausea, feeling jumpy, restlessness, irritable, apprehensive).   - Collaborate with interdisciplinary team and initiate plan and interventions as ordered.  - [place] patient to unit/surroundings  - Explain treatment plan  - Encourage participation in care  - Encourage verbalization of concerns/fears  - Identify coping mechanisms  - Assist in developing anxiety-reducing skills  - Administer/offer alternative therapies  - Limit or eliminate stimulants  Outcome: Progressing     Problem: Nutrition/Hydration-ADULT  Goal: Nutrient/Hydration intake appropriate for improving, restoring or maintaining nutritional needs  Description: Monitor and assess patient's nutrition/hydration status for malnutrition. Collaborate with interdisciplinary team and initiate plan and interventions as ordered.  Monitor patient's weight and dietary intake as ordered or per policy. Utilize nutrition screening tool and intervene as necessary. Determine patient's food preferences and provide high-protein, high-caloric foods as appropriate.     INTERVENTIONS:  - Monitor oral intake, urinary output, labs, and treatment plans  - Assess nutrition and hydration status and recommend course of action  - Evaluate amount of meals eaten  - Assist patient with  eating if necessary   - Allow adequate time for meals  - Recommend/ encourage appropriate diets, oral nutritional supplements, and vitamin/mineral supplements  - Order, calculate, and assess calorie counts as needed  - Recommend, monitor, and adjust tube feedings and TPN/PPN based on assessed needs  - Assess need for intravenous fluids  - Provide specific nutrition/hydration education as appropriate  - Include patient/family/caregiver in decisions related to nutrition  Outcome: Progressing     Problem: DISCHARGE PLANNING  Goal: Discharge to home or other facility with appropriate resources  Description: INTERVENTIONS:  - Identify barriers to discharge w/patient and caregiver  - Arrange for needed discharge resources and transportation as appropriate  - Identify discharge learning needs (meds, wound care, etc.)  - Arrange for interpretive services to assist at discharge as needed  - Refer to Case Management Department for coordinating discharge planning if the patient needs post-hospital services based on physician/advanced practitioner order or complex needs related to functional status, cognitive ability, or social support system  Outcome: Progressing

## 2024-03-01 PROCEDURE — 99232 SBSQ HOSP IP/OBS MODERATE 35: CPT | Performed by: STUDENT IN AN ORGANIZED HEALTH CARE EDUCATION/TRAINING PROGRAM

## 2024-03-01 RX ADMIN — MIRTAZAPINE 7.5 MG: 7.5 TABLET, FILM COATED ORAL at 21:22

## 2024-03-01 RX ADMIN — ESCITALOPRAM OXALATE 15 MG: 5 TABLET, FILM COATED ORAL at 09:55

## 2024-03-01 RX ADMIN — HYDROXYZINE HYDROCHLORIDE 75 MG: 25 TABLET ORAL at 15:38

## 2024-03-01 RX ADMIN — HALOPERIDOL 2.5 MG: 1 TABLET ORAL at 17:04

## 2024-03-01 RX ADMIN — HYDROXYZINE HYDROCHLORIDE 75 MG: 25 TABLET ORAL at 21:23

## 2024-03-01 RX ADMIN — MELATONIN TAB 3 MG 6 MG: 3 TAB at 21:22

## 2024-03-01 RX ADMIN — HYDROXYZINE HYDROCHLORIDE 75 MG: 25 TABLET ORAL at 09:55

## 2024-03-01 RX ADMIN — HYDROXYZINE HYDROCHLORIDE 50 MG: 50 TABLET, FILM COATED ORAL at 17:05

## 2024-03-01 NOTE — NURSING NOTE
PT remained calm after PRN meds, denies SI/HI/VH/AH, reports anxiety but able to control self. PT compliant with HS meds without any further needs at this time.

## 2024-03-01 NOTE — PLAN OF CARE
Problem: Ineffective Coping  Goal: Demonstrates healthy coping skills  Outcome: Progressing  Goal: Participates in unit activities  Description: Interventions:  - Provide therapeutic environment   - Provide required programming   - Redirect inappropriate behaviors   Outcome: Progressing   Patient attends groups and has demonstrated he can handle situations that he does not like but maintains behavioral control,

## 2024-03-01 NOTE — NURSING NOTE
"PT upon approach presented with agitation, pulling his hair, trembling with his hands, PT was unable to response to this writer, stated \"Atarax doesn't work for me, up set right now\". Attempted to speak with PT, unsuccessful, offered PRN meds PT agreeable to meds. PRN Haldol 1mg given for mild agitation at 1944.  Will continue to monitor for out come.    2044: Re-assessment for PRN Haldol 1mg given at 1944 was effective,PT appears calmer, relaxed and able to voice his concerns to writer, \"Want to known my discharged plans\", encouraged to rise his concerns  with his treatment team.  "

## 2024-03-01 NOTE — PROGRESS NOTES
"Progress Note - Behavioral Health   Shaquille Ackerman 28 y.o. male MRN: [id number]  Unit/Bed#: [facility] 346-02 Encounter: [id number]    Assessment/Plan   Principal Problem:    Major depressive disorder, recurrent episode (HCC)  Active Problems:    Medical clearance for psychiatric admission    Marijuana abuse    Serum total bilirubin elevated      Recommended Treatment:   Continue Lexapro 15 mg daily for depression/anxiety  Continue Atarax 75 mg 3 times daily for anxiety  Continue Remron 7.5 mg nightly for depression and weight gain  Continue melatonin 6 mg nightly for insomnia  Consider starting Abilify if irritability is persistent.  Continue other medications/PRNs as noted below.  Continue medical management by SLIM team.  Continue with group therapy, milieu therapy and occupational therapy.    Continue frequent safety checks and vitals per unit protocol.  Discharge disposition:  TBD. Discussion for disposition in a rehab facility was started.     Case discussed with treatment team.  Risks, benefits and possible side effects of Medications: Risks, benefits, and possible side effects of medications have been explained to the patient, who verbalizes understanding.      ------------------------------------------------------------  Subjective:  The patient was evaluated this morning for continuity of care and no acute distress noted throughout the evaluation.  Over the past 24 hours staff noted the patient was angry and \"wants to know discharge plan\" while also claiming that \"atarax doesn't work.\" Staff gave Shaquille 1 mg Haldol PRN for agitation which was effective.    Today on exam the patient was doing \"okay\" and reports \"not being happy about [a peer] leaving.\" When debriefing about the previous day and night, he says that he feels as though he \"subconsciously [is] constantly annoyed\" where he then is \"more easily angered.\" Yesterday, he was watching a movie with peers before it was interrupted for a group activity that he did " "not want to participate in. This easily angered and annoyed him. When recounting how he handled the situation he says that he feels as though he \"could have handled it better.\" Patient seems preoccupied and sounds like he is worried about disposition which is contributing to his annoyance at the hospital. The patient continues to deny suicidal ideation, homicidal ideation, and any hallucinations.     Psychiatric Review of Systems:  Behavior over the last 24 hours:  improved  Sleep:  \"okay\" trouble sleeping because it was \"chilly.\"  Appetite: fair, drinking an ensure during evaluation  Medication side effects: No   ROS: all other systems are negative    Progress Toward Goals: progressing    Current Medications:  Current Facility-Administered Medications   Medication Dose Route Frequency Provider Last Rate    acetaminophen  650 mg Oral Q6H PRN Zakia Mal, DO      acetaminophen  650 mg Oral Q4H PRN Zakia Mal, DO      acetaminophen  975 mg Oral Q6H PRN Zakia Mal, DO      aluminum-magnesium hydroxide-simethicone  30 mL Oral Q4H PRN Zakia Resendez, DO      haloperidol lactate  2.5 mg Intramuscular Q4H PRN Max 4/day Zakia Mal, DO      And    LORazepam  1 mg Intramuscular Q4H PRN Max 4/day Zakia Mal, DO      And    benztropine  0.5 mg Intramuscular Q4H PRN Max 4/day Zakia Mal, DO      haloperidol lactate  5 mg Intramuscular Q4H PRN Max 4/day Zakia Mal, DO      And    LORazepam  2 mg Intramuscular Q4H PRN Max 4/day Zakia Mal, DO      And    benztropine  1 mg Intramuscular Q4H PRN Max 4/day Zakia Mal, DO      benztropine  1 mg Intramuscular Q4H PRN Max 6/day Zakia Mal, DO      benztropine  1 mg Oral Q4H PRN Max 6/day Zakia Mal, DO      hydrOXYzine HCL  50 mg Oral Q6H PRN Max 4/day Zakia Resendez, DO      Or    diphenhydrAMINE  50 mg Intramuscular Q6H PRN Zakia Mal, DO      escitalopram  15 mg Oral Daily Kylah Cason,       Artificial Tears  1 drop Both Eyes Q3H PRN " Zakia Mal, DO      haloperidol  1 mg Oral Q6H PRN Zakia Mal, DO      haloperidol  2.5 mg Oral Q4H PRN Max 4/day Zakia Mal, DO      haloperidol  5 mg Oral Q4H PRN Max 4/day Zakia Mal, DO      hydrOXYzine HCL  100 mg Oral Q6H PRN Max 4/day Zakia Mal, DO      Or    LORazepam  2 mg Intramuscular Q6H PRN Zakia Mal, DO      hydrOXYzine HCL  25 mg Oral Q6H PRN Max 4/day Zakia Mal, DO      hydrOXYzine HCL  75 mg Oral TID Gregory Bennett MD      melatonin  6 mg Oral HS Kylah Harach, DO      mirtazapine  7.5 mg Oral HS PRN Gregory Bennett MD      mirtazapine  7.5 mg Oral HS Gregory Bennett MD      polyethylene glycol  17 g Oral Daily PRN Zakia Mal, DO      propranolol  10 mg Oral Q8H PRN Zakia Mal, DO      senna-docusate sodium  1 tablet Oral Daily PRN Zakia Mal, DO         Behavioral Health Medications: all current active meds have been reviewed.    Vital signs in last 24 hours:  Temp:  [96.9 °F (36.1 °C)-99 °F (37.2 °C)] 96.9 °F (36.1 °C)  HR:  [58-77] 58  Resp:  [16] 16  BP: (117-125)/(57-61) 117/57    Laboratory results:  I have personally reviewed all pertinent laboratory/tests results.    Mental Status Evaluation:    Appearance:  casually dressed, marginal hygiene, looks stated age, underweight, bearded   Behavior:  pleasant, cooperative, calm, guarded   Speech:  normal volume, clear, coherent, decreased rate, delayed   Mood:  improved   Affect:  constricted, mood-congruent   Thought Process:  logical, coherent, goal directed, decreased rate of thoughts, concrete   Associations: intact associations   Thought Content:  negative thinking, ruminating thoughts, preoccupied   Perceptual Disturbances: no auditory hallucinations, denies auditory hallucinations when asked, no visual hallucinations, denies visual hallucinations when asked   Risk Potential: Suicidal ideation - None at present  Homicidal ideation - None at present  Potential for aggression - No    Sensorium:  oriented to person, place, and time/date   Memory:  recent and remote memory grossly intact   Consciousness:  alert and awake   Attention/Concentration: attention span and concentration are age appropriate   Insight:  fair   Judgment: fair   Gait/Station: normal gait/station   Motor Activity: no abnormal movements             Recommended Treatment:   See above for assessment and plan.     Risks, benefits and possible side effects of Medications:   Risks, benefits, and possible side effects of medications explained to patient and patient verbalizes understanding.      Silvano Ortega MS-3

## 2024-03-01 NOTE — TREATMENT PLAN
TREATMENT PLAN REVIEW - Behavioral Health Shaquille Ackerman 28 y.o. 1995 male MRN: [id number]    [facility] 3B BEHAVIORAL [facility] Room / Bed: [facility] 346/[facility] 346-02 Encounter: [id number]          Admit Date/Time:  1/31/2024  3:09 PM    Treatment Team:   MD Des Cota DO Sarah Alderfer Sophia Starner Anita Feliz, RN Loree Smith Pope, LPC Jennifer King Justin Kaiser, RN Jarrell Johnson McKenna Lee    Diagnosis: Principal Problem:    Major depressive disorder, recurrent episode (HCC)  Active Problems:    Medical clearance for psychiatric admission    Marijuana abuse    Serum total bilirubin elevated      Patient Strengths/Assets: ability for insight, cooperative, motivated, negotiates basic needs, patient is on a voluntary commitment, patient is willing to work on problems, reasoning ability    Patient Barriers/Limitations: difficulty adapting, financial instability, homeless, limited support system, poor self-care, substance abuse    Short Term Goals: decrease in depressive symptoms, decrease in anxiety symptoms, ability to stay safe on the unit, ability to stay free of restraints, improvement in ability to express basic needs, improvement in insight, improvement in reasoning ability, improvement in self care, sleep improvement, improvement in appetite, mood stabilization, increase in group attendance, increase in socialization with peers on the unit, acceptance of need for psychiatric treatment, acceptance of psychiatric medications    Long Term Goals: improvement in depression, improvement in anxiety, free of suicidal thoughts, free of homicidal thoughts, no self abusive behavior, improvement in reasoning ability, improved insight, able to express basic needs, acceptance of need for psychiatric medications, acceptance of need for psychiatric treatment, acceptance of need for psychiatric follow up after discharge, acceptance of psychiatric diagnosis,  adequate self care, adequate sleep, adequate appetite, appropriate interaction with peers, appropriate interaction with family, stable living arrangements upon discharge    Progress Towards Goals: continue psychiatric medications as prescribed, progressing slowly, attends groups, participates in milieu therapy, depression is improving, less anxious    Recommended Treatment: medication management, patient medication education, group therapy, milieu therapy, continued Behavioral Health psychiatric evaluation/assessment process    Treatment Frequency: daily medication monitoring, group and milieu therapy daily, monitoring through interdisciplinary rounds, monitoring through weekly patient care conferences    Expected Discharge Date:  3/30/24    Discharge Plan: placement in alternative living arrangement, referrals as indicated    Treatment Plan Created/Updated By: Gregory Bennett MD

## 2024-03-01 NOTE — PROGRESS NOTES
Met with Ochoa informed him the [facility] will not pay for CRR since he does not have income. He is in agreement to go to rehab with the bed search starting Monday. He asked once he gets his insurance will a group home be a possibility, informed him I would check with his SW. 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

## 2024-03-01 NOTE — PLAN OF CARE
Problem: Ineffective Coping  Goal: Identifies ineffective coping skills  Outcome: Progressing  Goal: Identifies healthy coping skills  Outcome: Progressing  Goal: Demonstrates healthy coping skills  Outcome: Progressing  Goal: Participates in unit activities  Description: Interventions:  - Provide therapeutic environment   - Provide required programming   - Redirect inappropriate behaviors   Outcome: Progressing  Goal: Patient/Family participate in treatment and DC plans  Description: Interventions:  - Provide therapeutic environment  Outcome: Progressing  Goal: Patient/Family verbalizes awareness of resources  Outcome: Progressing  Goal: Understands least restrictive measures  Description: Interventions:  - Utilize least restrictive behavior  Outcome: Progressing  Goal: Free from restraint events  Description: - Utilize least restrictive measures   - Provide behavioral interventions   - Redirect inappropriate behaviors   Outcome: Progressing     Problem: Depression  Goal: Treatment Goal: Demonstrate behavioral control of depressive symptoms, verbalize feelings of improved mood/affect, and adopt new coping skills prior to discharge  Outcome: Progressing  Goal: Verbalize thoughts and feelings  Description: Interventions:  - Assess and re-assess patient's level of risk   - Engage patient in 1:1 interactions, daily, for a minimum of 15 minutes   - Encourage patient to express feelings, fears, frustrations, hopes   Outcome: Progressing  Goal: Refrain from harming self  Description: Interventions:  - Monitor patient closely, per order   - Supervise medication ingestion, monitor effects and side effects   Outcome: Progressing  Goal: Refrain from isolation  Description: Interventions:  - Develop a trusting relationship   - Encourage socialization   Outcome: Progressing  Goal: Refrain from self-neglect  Outcome: Progressing  Goal: Attend and participate in unit activities, including therapeutic, recreational, and  educational groups  Description: Interventions:  - Provide therapeutic and educational activities daily, encourage attendance and participation, and document same in the medical record   Outcome: Progressing  Goal: Complete daily ADLs, including personal hygiene independently, as able  Description: Interventions:  - Observe, teach, and assist patient with ADLS  -  Monitor and promote a balance of rest/activity, with adequate nutrition and elimination   Outcome: Progressing     Problem: Anxiety  Goal: Anxiety is at manageable level  Description: Interventions:  - Assess and monitor patient's anxiety level.   - Monitor for signs and symptoms (heart palpitations, chest pain, shortness of breath, headaches, nausea, feeling jumpy, restlessness, irritable, apprehensive).   - Collaborate with interdisciplinary team and initiate plan and interventions as ordered.  - Raymond patient to unit/surroundings  - Explain treatment plan  - Encourage participation in care  - Encourage verbalization of concerns/fears  - Identify coping mechanisms  - Assist in developing anxiety-reducing skills  - Administer/offer alternative therapies  - Limit or eliminate stimulants  Outcome: Progressing     Problem: Nutrition/Hydration-ADULT  Goal: Nutrient/Hydration intake appropriate for improving, restoring or maintaining nutritional needs  Description: Monitor and assess patient's nutrition/hydration status for malnutrition. Collaborate with interdisciplinary team and initiate plan and interventions as ordered.  Monitor patient's weight and dietary intake as ordered or per policy. Utilize nutrition screening tool and intervene as necessary. Determine patient's food preferences and provide high-protein, high-caloric foods as appropriate.     INTERVENTIONS:  - Monitor oral intake, urinary output, labs, and treatment plans  - Assess nutrition and hydration status and recommend course of action  - Evaluate amount of meals eaten  - Assist patient with  eating if necessary   - Allow adequate time for meals  - Recommend/ encourage appropriate diets, oral nutritional supplements, and vitamin/mineral supplements  - Order, calculate, and assess calorie counts as needed  - Recommend, monitor, and adjust tube feedings and TPN/PPN based on assessed needs  - Assess need for intravenous fluids  - Provide specific nutrition/hydration education as appropriate  - Include patient/family/caregiver in decisions related to nutrition  Outcome: Progressing

## 2024-03-01 NOTE — PROGRESS NOTES
03/01/24 0916   Team Meeting   Meeting Type Daily Rounds   Team Members Present   Team Members Present Physician;Nurse;   Physician Team Member Sabrina   [facility] Kimberley   Patient/Family Present   Patient Present No   Patient's Family Present No     Pt med/meal compliant. Discussed possible rehab placement. Discharge to be determined.

## 2024-03-01 NOTE — PROGRESS NOTES
Progress Note - Behavioral Health   Shaquille Ackerman 28 y.o. male MRN: [id number]  Unit/Bed#: [facility] 346-02 Encounter: [id number]    Assessment/Plan   Principal Problem:    Major depressive disorder, recurrent episode (HCC)  Active Problems:    Medical clearance for psychiatric admission    Marijuana abuse    Serum total bilirubin elevated      Plan:  Continue medication per primary team:  Escitalopram 15 mg QD  Hydroxyzine 75 mg TID  Mirtazapine 7.5 mg QHS  Melatonin 6 mg QHS    Primary team considering addition of aripiprazole next week.  Tentative discharge to rehabilitation.    Continue with group therapy, milieu therapy and occupational therapy.    Continue frequent safety checks and vitals per unit protocol.  Case discussed with treatment team.  Continue with SLIM medical management as indicated  Continue coordinating with case management regarding disposition  Risks, benefits and possible side effects of Medications: Risks, benefits, and possible side effects of medications have previously been explained. No new medications at this time.    Legal Status: 201  ------------------------------------------------------------    Subjective: Nursing reports patient has tentative plan of discharge to rehabilitation.  Today patient reports that this is his understanding and is in agreement.  He reports having slept normally and is eating normally.  He denies pain or nausea and has no other concerns or questions.    PRNs overnight: None  VS: Reviewed, within normal limits    Progress Toward Goals: slow improvement    Psychiatric Review of Systems:  Behavior over the last 24 hours: unchanged  Sleep: normal  Appetite: adequate  Medication side effects: none verbalized  ROS: Complete review of systems is negative except as noted above.    Vital signs in last 24 hours:  Temp:  [97.3 °F (36.3 °C)-98.2 °F (36.8 °C)] 97.3 °F (36.3 °C)  HR:  [57-80] 57  Resp:  [16] 16  BP: (130-133)/(64-67) 133/64    Mental Status  "Exam:  Appearance Thin-appearing, casual attire, medium-length red wavy hair  Appropriate eye contact   Behavior Lying in bed, cooperative   Speech/language Normal rate and volume   Mood \"neutral\"   Affect Mood-congruent, indifferent   Thought process Logical, Goal directed   Thought content/perceptual experiences:  No overt delusions, Denies hallucinations, Denies suicidal and homicidal ideation   Cognition Level of Awareness or Wakefulness: Awake and alert  Attention and Concentration: Able to concentrate on questions and maintain train of thought  Memory: Oriented and memory grossly intact   Insight/judgment Insight: moderate  Judgment: moderate     Current Medications:  Current Facility-Administered Medications   Medication Dose Route Frequency Provider Last Rate    acetaminophen  650 mg Oral Q6H PRN Zakia Resendez, DO      acetaminophen  650 mg Oral Q4H PRN Zakia Resendez, DO      acetaminophen  975 mg Oral Q6H PRN Zakia Resendez, DO      aluminum-magnesium hydroxide-simethicone  30 mL Oral Q4H PRN Zakia Resendez, DO      haloperidol lactate  2.5 mg Intramuscular Q4H PRN Max 4/day Zakia Resendez DO      And    LORazepam  1 mg Intramuscular Q4H PRN Max 4/day Zakia Resendez DO      And    benztropine  0.5 mg Intramuscular Q4H PRN Max 4/day Zakia Resendze, DO      haloperidol lactate  5 mg Intramuscular Q4H PRN Max 4/day Zakia Resendez, DO      And    LORazepam  2 mg Intramuscular Q4H PRN Max 4/day Zakia Resendez, DO      And    benztropine  1 mg Intramuscular Q4H PRN Max 4/day Zakia Resendez, DO      benztropine  1 mg Intramuscular Q4H PRN Max 6/day Zakia Resendez, DO      benztropine  1 mg Oral Q4H PRN Max 6/day Zakia Resendez, DO      hydrOXYzine HCL  50 mg Oral Q6H PRN Max 4/day Zakia Resendez, DO      Or    diphenhydrAMINE  50 mg Intramuscular Q6H PRN Zakia Resendez, DO      escitalopram  15 mg Oral Daily Kylah Cason,       Artificial Tears  1 drop Both Eyes Q3H PRN Zakia Resendez,       haloperidol  1 " mg Oral Q6H PRN Zakia Mal, DO      haloperidol  2.5 mg Oral Q4H PRN Max 4/day Zakia Mal, DO      haloperidol  5 mg Oral Q4H PRN Max 4/day Zakia Mal, DO      hydrOXYzine HCL  100 mg Oral Q6H PRN Max 4/day Zakia Mal, DO      Or    LORazepam  2 mg Intramuscular Q6H PRN Zakia Mal, DO      hydrOXYzine HCL  25 mg Oral Q6H PRN Max 4/day Zakia Mal, DO      hydrOXYzine HCL  75 mg Oral TID Gregory Bennett MD      melatonin  6 mg Oral HS Kylah Cason, DO      mirtazapine  7.5 mg Oral HS PRN Gregory Bennett MD      mirtazapine  7.5 mg Oral HS Gregory Bennett MD      polyethylene glycol  17 g Oral Daily PRN Zakia Resendez, DO      propranolol  10 mg Oral Q8H PRN Zakia Resendez, DO      senna-docusate sodium  1 tablet Oral Daily PRN Zakia Resendez, DO         Behavioral Health Medications: all current active meds have been reviewed. Changes as in plan section above.    Laboratory results:  I have personally reviewed all pertinent laboratory/tests results.  No results found for this or any previous visit (from the past 48 hour(s)).     This note has been constructed using a voice recognition system. There may be translation, syntax, or grammatical errors. If you have any questions, please contact the dictating author.    Reece Hartman MD  PGY II  Psychiatry

## 2024-03-02 PROCEDURE — 99232 SBSQ HOSP IP/OBS MODERATE 35: CPT | Performed by: PSYCHIATRY & NEUROLOGY

## 2024-03-02 RX ADMIN — HYDROXYZINE HYDROCHLORIDE 75 MG: 25 TABLET ORAL at 21:07

## 2024-03-02 RX ADMIN — MELATONIN TAB 3 MG 6 MG: 3 TAB at 21:07

## 2024-03-02 RX ADMIN — HYDROXYZINE HYDROCHLORIDE 75 MG: 25 TABLET ORAL at 09:42

## 2024-03-02 RX ADMIN — ESCITALOPRAM OXALATE 15 MG: 5 TABLET, FILM COATED ORAL at 09:41

## 2024-03-02 RX ADMIN — HYDROXYZINE HYDROCHLORIDE 75 MG: 25 TABLET ORAL at 15:57

## 2024-03-02 RX ADMIN — MIRTAZAPINE 7.5 MG: 7.5 TABLET, FILM COATED ORAL at 21:08

## 2024-03-02 NOTE — NURSING NOTE
"PT visible in the Dayroom, cooperative and calm on approach. PT denies SI/HI/VH/AH, reported \"feeling better today, hopeful CM will assist in finding rehab place for me and my insurance goes through for group home placement\". Compliant with  meds, numet needs during this shift.   "

## 2024-03-02 NOTE — NURSING NOTE
Patient complaining of feeling anxious regarding his status and what he will do after he leaves here. Patient also reports feeling agitated, notable shaking of voice, and body, clenched fists. Given 2.5mg PO PRN of haldol for moderate agitation, and 50mg PO prn of atarax for moderate anxiety.

## 2024-03-02 NOTE — NURSING NOTE
"2.5mg PO PRN of haldol for moderate agitation, and 50mg PO prn of atarax for moderate anxiety effective. Patient calmly walking milieu and reports feeling \"Better.\"      "

## 2024-03-02 NOTE — NURSING NOTE
Patient was assessed in room during medication administration, patient reports no AVH/SI/HI. Patient has had no behavioral issues to note. Patient has been med compliant. Patient states they have no further needs at this time. Patient vocalizes he has no depression or anxiety at this current time.

## 2024-03-02 NOTE — PLAN OF CARE
Problem: Depression  Goal: Treatment Goal: Demonstrate behavioral control of depressive symptoms, verbalize feelings of improved mood/affect, and adopt new coping skills prior to discharge  Outcome: Progressing  Goal: Verbalize thoughts and feelings  Description: Interventions:  - Assess and re-assess patient's level of risk   - Engage patient in 1:1 interactions, daily, for a minimum of 15 minutes   - Encourage patient to express feelings, fears, frustrations, hopes   Outcome: Progressing  Goal: Refrain from harming self  Description: Interventions:  - Monitor patient closely, per order   - Supervise medication ingestion, monitor effects and side effects   Outcome: Progressing  Goal: Refrain from isolation  Description: Interventions:  - Develop a trusting relationship   - Encourage socialization   Outcome: Progressing  Goal: Refrain from self-neglect  Outcome: Progressing  Goal: Attend and participate in unit activities, including therapeutic, recreational, and educational groups  Description: Interventions:  - Provide therapeutic and educational activities daily, encourage attendance and participation, and document same in the medical record   Outcome: Progressing  Goal: Complete daily ADLs, including personal hygiene independently, as able  Description: Interventions:  - Observe, teach, and assist patient with ADLS  -  Monitor and promote a balance of rest/activity, with adequate nutrition and elimination   Outcome: Progressing     Problem: Anxiety  Goal: Anxiety is at manageable level  Description: Interventions:  - Assess and monitor patient's anxiety level.   - Monitor for signs and symptoms (heart palpitations, chest pain, shortness of breath, headaches, nausea, feeling jumpy, restlessness, irritable, apprehensive).   - Collaborate with interdisciplinary team and initiate plan and interventions as ordered.  - [place] patient to unit/surroundings  - Explain treatment plan  - Encourage participation in  care  - Encourage verbalization of concerns/fears  - Identify coping mechanisms  - Assist in developing anxiety-reducing skills  - Administer/offer alternative therapies  - Limit or eliminate stimulants  Outcome: Progressing

## 2024-03-03 PROCEDURE — 99232 SBSQ HOSP IP/OBS MODERATE 35: CPT | Performed by: PSYCHIATRY & NEUROLOGY

## 2024-03-03 RX ADMIN — HYDROXYZINE HYDROCHLORIDE 75 MG: 25 TABLET ORAL at 21:10

## 2024-03-03 RX ADMIN — ESCITALOPRAM OXALATE 15 MG: 5 TABLET, FILM COATED ORAL at 08:47

## 2024-03-03 RX ADMIN — MELATONIN TAB 3 MG 6 MG: 3 TAB at 21:10

## 2024-03-03 RX ADMIN — HYDROXYZINE HYDROCHLORIDE 75 MG: 25 TABLET ORAL at 15:22

## 2024-03-03 RX ADMIN — MIRTAZAPINE 7.5 MG: 7.5 TABLET, FILM COATED ORAL at 22:26

## 2024-03-03 RX ADMIN — HALOPERIDOL 2.5 MG: 1 TABLET ORAL at 22:26

## 2024-03-03 RX ADMIN — MIRTAZAPINE 7.5 MG: 7.5 TABLET, FILM COATED ORAL at 21:09

## 2024-03-03 RX ADMIN — HYDROXYZINE HYDROCHLORIDE 75 MG: 25 TABLET ORAL at 08:48

## 2024-03-03 NOTE — PLAN OF CARE
Problem: Depression  Goal: Treatment Goal: Demonstrate behavioral control of depressive symptoms, verbalize feelings of improved mood/affect, and adopt new coping skills prior to discharge  Outcome: Progressing  Goal: Verbalize thoughts and feelings  Description: Interventions:  - Assess and re-assess patient's level of risk   - Engage patient in 1:1 interactions, daily, for a minimum of 15 minutes   - Encourage patient to express feelings, fears, frustrations, hopes   Outcome: Progressing  Goal: Refrain from harming self  Description: Interventions:  - Monitor patient closely, per order   - Supervise medication ingestion, monitor effects and side effects   Outcome: Progressing  Goal: Refrain from isolation  Description: Interventions:  - Develop a trusting relationship   - Encourage socialization   Outcome: Progressing  Goal: Refrain from self-neglect  Outcome: Progressing  Goal: Attend and participate in unit activities, including therapeutic, recreational, and educational groups  Description: Interventions:  - Provide therapeutic and educational activities daily, encourage attendance and participation, and document same in the medical record   Outcome: Progressing  Goal: Complete daily ADLs, including personal hygiene independently, as able  Description: Interventions:  - Observe, teach, and assist patient with ADLS  -  Monitor and promote a balance of rest/activity, with adequate nutrition and elimination   Outcome: Progressing     Problem: Anxiety  Goal: Anxiety is at manageable level  Description: Interventions:  - Assess and monitor patient's anxiety level.   - Monitor for signs and symptoms (heart palpitations, chest pain, shortness of breath, headaches, nausea, feeling jumpy, restlessness, irritable, apprehensive).   - Collaborate with interdisciplinary team and initiate plan and interventions as ordered.  - [place] patient to unit/surroundings  - Explain treatment plan  - Encourage participation in  care  - Encourage verbalization of concerns/fears  - Identify coping mechanisms  - Assist in developing anxiety-reducing skills  - Administer/offer alternative therapies  - Limit or eliminate stimulants  Outcome: Progressing

## 2024-03-03 NOTE — NURSING NOTE
Pt awake and alert, visible in the milieu, social with select peers. Pt denies SI/HI/AVH at this time, denies anxiety or depression though appears to be anxious. Pt reports adequate sleep. Pt compliant with scheduled medications and meals, appetite good. Pt denies any unmet needs at this time. Q7 minute safety checks maintained.

## 2024-03-03 NOTE — NURSING NOTE
Patient remained visible on the unit throughout the evening. Cooperative with routine. Denied any unmet needs. HS medication compliant.

## 2024-03-03 NOTE — PROGRESS NOTES
Progress Note - Behavioral Health   Shaquille Ackerman 28 y.o. male MRN: [id number]  Unit/Bed#: [facility] 346-02 Encounter: [id number]    Assessment/Plan   Principal Problem:    Major depressive disorder, recurrent episode (HCC)  Active Problems:    Medical clearance for psychiatric admission    Marijuana abuse    Serum total bilirubin elevated      Plan:  Continue medication per primary team:  Escitalopram 15 mg QD  Hydroxyzine 75 mg TID  Mirtazapine 7.5 mg QHS  Melatonin 6 mg QHS    Continue dietary supplements; follow-up with nutrition.    Continue with group therapy, milieu therapy and occupational therapy.    Continue frequent safety checks and vitals per unit protocol.  Case discussed with treatment team.  Continue with SLIM medical management as indicated  Continue coordinating with case management regarding disposition  Risks, benefits and possible side effects of Medications: Risks, benefits, and possible side effects of medications have previously been explained. No new medications at this time.    Legal Status: 201  ------------------------------------------------------------    Subjective: Per nursing patient has anxiety at times however slept without problem.  Patient reports eating normally and sleeping without issue.  He reports having vivid dreams however they are not distressing and mostly are reviewing of mundane events of the previous day.  Another patient gave him a fantasy book to read which she intends to read more off today.    PRNs overnight: None  VS: Reviewed, within normal limits    Progress Toward Goals: slow improvement    Psychiatric Review of Systems:  Behavior over the last 24 hours: unchanged  Sleep: normal  Appetite: adequate  Medication side effects: none verbalized  ROS: Complete review of systems is negative except as noted above.    Vital signs in last 24 hours:  Temp:  [97.4 °F (36.3 °C)-98.5 °F (36.9 °C)] 97.4 °F (36.3 °C)  HR:  [59-68] 59  Resp:  [16] 16  BP: (103-115)/(55-59)  "103/55    Mental Status Exam:  Appearance Red hair disheveled, black shirt, thin, glasses  Appropriate eye contact   Behavior Lying in bed, cooperative   Speech/language Normal rate and volume   Mood \"ok\"   Affect Somewhat constricted, anxious, becomes more euthymic with time   Thought process Logical, Goal directed   Thought content/perceptual experiences:  No overt delusions, Denies hallucinations, Denies suicidal and homicidal ideation   Cognition Level of Awareness or Wakefulness: Awake and alert  Attention and Concentration: Able to concentrate on questions and maintain train of thought  Memory: Oriented and memory grossly intact   Insight/judgment Insight: moderate  Judgment: moderate     Current Medications:  Current Facility-Administered Medications   Medication Dose Route Frequency Provider Last Rate    acetaminophen  650 mg Oral Q6H PRN Zakia Resendez, DO      acetaminophen  650 mg Oral Q4H PRN Zakia Resendez, DO      acetaminophen  975 mg Oral Q6H PRN Zakia Resendez, DO      aluminum-magnesium hydroxide-simethicone  30 mL Oral Q4H PRN Zakia Resendez, DO      haloperidol lactate  2.5 mg Intramuscular Q4H PRN Max 4/day Zakia Resendez, DO      And    LORazepam  1 mg Intramuscular Q4H PRN Max 4/day Zakia Resendez, DO      And    benztropine  0.5 mg Intramuscular Q4H PRN Max 4/day Zakia Resendez, DO      haloperidol lactate  5 mg Intramuscular Q4H PRN Max 4/day Zakia Resendez, DO      And    LORazepam  2 mg Intramuscular Q4H PRN Max 4/day Zakia Resendez, DO      And    benztropine  1 mg Intramuscular Q4H PRN Max 4/day Zakia Resendez, DO      benztropine  1 mg Intramuscular Q4H PRN Max 6/day Zakia Resendez, DO      benztropine  1 mg Oral Q4H PRN Max 6/day Zakia Resendez, DO      hydrOXYzine HCL  50 mg Oral Q6H PRN Max 4/day Zakia Resendez, DO      Or    diphenhydrAMINE  50 mg Intramuscular Q6H PRN Zakia Resendez, DO      escitalopram  15 mg Oral Daily Kylah Cason DO      Artificial Tears  1 drop Both Eyes Q3H " PRN Zakia Mal, DO      haloperidol  1 mg Oral Q6H PRN Zakia Mal, DO      haloperidol  2.5 mg Oral Q4H PRN Max 4/day Zakia Mal, DO      haloperidol  5 mg Oral Q4H PRN Max 4/day Zakia Mal, DO      hydrOXYzine HCL  100 mg Oral Q6H PRN Max 4/day Zakia Mal, DO      Or    LORazepam  2 mg Intramuscular Q6H PRN Zakia Mal, DO      hydrOXYzine HCL  25 mg Oral Q6H PRN Max 4/day Zakia Mal, DO      hydrOXYzine HCL  75 mg Oral TID Gregory Bennett MD      melatonin  6 mg Oral HS Kylah Harach, DO      mirtazapine  7.5 mg Oral HS PRN Gregory Bennett MD      mirtazapine  7.5 mg Oral HS Gregory Bennett MD      polyethylene glycol  17 g Oral Daily PRN Zakia Mal, DO      propranolol  10 mg Oral Q8H PRN Zakia Mal, DO      senna-docusate sodium  1 tablet Oral Daily PRN Zakia Mal, DO         Behavioral Health Medications: all current active meds have been reviewed. Changes as in plan section above.    Laboratory results:  I have personally reviewed all pertinent laboratory/tests results.  No results found for this or any previous visit (from the past 48 hour(s)).     This note has been constructed using a voice recognition system. There may be translation, syntax, or grammatical errors. If you have any questions, please contact the dictating author.    Reece Hartman MD  PGY II  Psychiatry

## 2024-03-04 PROBLEM — G47.00 INSOMNIA: Status: ACTIVE | Noted: 2024-03-04

## 2024-03-04 PROBLEM — F33.9 MAJOR DEPRESSIVE DISORDER, RECURRENT EPISODE (HCC): Status: RESOLVED | Noted: 2024-02-01 | Resolved: 2024-03-04

## 2024-03-04 PROBLEM — F41.9 ANXIETY: Status: ACTIVE | Noted: 2024-03-04

## 2024-03-04 PROBLEM — Z00.8 MEDICAL CLEARANCE FOR PSYCHIATRIC ADMISSION: Status: RESOLVED | Noted: 2024-02-01 | Resolved: 2024-03-04

## 2024-03-04 PROBLEM — R17 SERUM TOTAL BILIRUBIN ELEVATED: Status: RESOLVED | Noted: 2024-02-01 | Resolved: 2024-03-04

## 2024-03-04 PROCEDURE — 99232 SBSQ HOSP IP/OBS MODERATE 35: CPT | Performed by: STUDENT IN AN ORGANIZED HEALTH CARE EDUCATION/TRAINING PROGRAM

## 2024-03-04 RX ORDER — HYDROXYZINE HYDROCHLORIDE 25 MG/1
75 TABLET, FILM COATED ORAL 3 TIMES DAILY
Qty: 270 TABLET | Refills: 1 | Status: SHIPPED | OUTPATIENT
Start: 2024-03-04

## 2024-03-04 RX ORDER — HYDROXYZINE 50 MG/1
50 TABLET, FILM COATED ORAL EVERY 8 HOURS PRN
Qty: 60 TABLET | Refills: 1 | Status: SHIPPED | OUTPATIENT
Start: 2024-03-04

## 2024-03-04 RX ORDER — MIRTAZAPINE 7.5 MG/1
TABLET, FILM COATED ORAL
Qty: 60 TABLET | Refills: 1 | Status: SHIPPED | OUTPATIENT
Start: 2024-03-04

## 2024-03-04 RX ORDER — LANOLIN ALCOHOL/MO/W.PET/CERES
6 CREAM (GRAM) TOPICAL
Qty: 60 TABLET | Refills: 1 | Status: SHIPPED | OUTPATIENT
Start: 2024-03-04

## 2024-03-04 RX ORDER — ESCITALOPRAM OXALATE 10 MG/1
15 TABLET ORAL DAILY
Qty: 45 TABLET | Refills: 1 | Status: SHIPPED | OUTPATIENT
Start: 2024-03-05

## 2024-03-04 RX ADMIN — MELATONIN TAB 3 MG 6 MG: 3 TAB at 22:41

## 2024-03-04 RX ADMIN — MIRTAZAPINE 7.5 MG: 7.5 TABLET, FILM COATED ORAL at 22:42

## 2024-03-04 RX ADMIN — MIRTAZAPINE 7.5 MG: 7.5 TABLET, FILM COATED ORAL at 22:40

## 2024-03-04 RX ADMIN — HYDROXYZINE HYDROCHLORIDE 75 MG: 25 TABLET ORAL at 17:00

## 2024-03-04 RX ADMIN — HYDROXYZINE HYDROCHLORIDE 75 MG: 25 TABLET ORAL at 22:40

## 2024-03-04 RX ADMIN — HYDROXYZINE HYDROCHLORIDE 75 MG: 25 TABLET ORAL at 08:24

## 2024-03-04 RX ADMIN — ESCITALOPRAM OXALATE 15 MG: 5 TABLET, FILM COATED ORAL at 08:24

## 2024-03-04 NOTE — NURSING NOTE
Patient is calm and cooperative upon approach. Patient is visible in dayroom. Patient denies SI/HI/AH/VH, anxiety and depression. Patient is compliant with meds and meals.

## 2024-03-04 NOTE — BH TRANSITION RECORD
Contact Information: If you have any questions, concerns, pended studies, tests and/or procedures, or emergencies regarding your inpatient behavioral health visit. Please contact [facility] [phone]  and ask to speak to a , nurse or physician. A contact is available 24 hours/ 7 days a week at this number.     Summary of Procedures Performed During your Stay:  Below is a list of major procedures performed during your hospital stay and a summary of results:  - Cardiac Procedures/Studies: EKG 2/1/24: Normal sinus rhythm, Moderate voltage criteria for LVH, may be normal variant, QTc 414.    Pending Studies (From admission, onward)      None          Please follow up on the above pending studies with your PCP and/or referring provider.    Gregory Bennett MD 03/04/24

## 2024-03-04 NOTE — SOCIAL WORK
KORI spoke with [facility] Drug and alcohol commission. KORI was advised by Shailesh that pt is approved for [facility] and suggested SW try [facility] for placement.     KORI faxed clinical to [facility] for review and bed was placed on hold at [place] location. KORI assisted pt in calling in to complete pre-screen.

## 2024-03-04 NOTE — NURSING NOTE
Patient is resting comfortably in bed with no further complaints of agitation. PRN remeron 7.5mg and haldol 2.5mg were effective.

## 2024-03-04 NOTE — NURSING NOTE
"Patient remained visible and social on the unit throughout the evening. Cooperative with routine. Joined group activities. HS medication compliant.     At 2226, patient reported difficulty resting. Requested and received Remeron 7.5 mg po prn at 2226.   Patient reported agitation due to having an urge to use \"alcohol, weed, or kratom\"   Haldol 2.5 mg po prn given at 2226.  "
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
## 2024-03-04 NOTE — PROGRESS NOTES
03/04/24 0921   Team Meeting   Meeting Type Daily Rounds   Team Members Present   Team Members Present Physician;Nurse;   Physician Team Member Sabrina   Nursing Team Member Saimajethro   Care Management Team Member Kimberley   Patient/Family Present   Patient Present No   Patient's Family Present No     Denies SI/HI, no behavioral issues. Visible overnight. Denying hallucinations. Requested and received Remeron d/t inability to sleep. Reporting urges to smoke and drink overnight. Bed search to begin today.

## 2024-03-04 NOTE — PLAN OF CARE
Problem: Ineffective Coping  Goal: Identifies ineffective coping skills  Outcome: Progressing  Goal: Demonstrates healthy coping skills  Outcome: Progressing  Goal: Participates in unit activities  Description: Interventions:  - Provide therapeutic environment   - Provide required programming   - Redirect inappropriate behaviors   Outcome: Progressing   Patient has identified his Substance use as a poor coping strategy and continues to use group therapy to a gain insight into his behavioral patterns

## 2024-03-04 NOTE — SOCIAL WORK
"SW received return phone call from [facility]. SW was advised that pt is denied for today d/t episode of \"agitation\" on 2/29. SW advised pt has not had physical acting out or verbal aggression. SW was advised that pt must have several \"good days\" before he can be reconsidered. KORI advised pt has already had many good days. Decision remains that pt is denied for today.     "

## 2024-03-04 NOTE — PROGRESS NOTES
"Progress Note - Behavioral Health   Shaquille Ackerman 28 y.o. male MRN: [id number]  Unit/Bed#: U 346-02 Encounter: [id number]    Assessment/Plan   Principal Problem:    Major depressive disorder, recurrent episode (HCC)  Active Problems:    Medical clearance for psychiatric admission    Marijuana abuse    Serum total bilirubin elevated      Recommended Treatment:   Continue Lexapro 15 mg daily for depression/anxiety  Continue Atarax 75 mg 3 times daily for anxiety  Continue Remron 7.5 mg nightly for depression and weight gain  Continue melatonin 6 mg nightly for insomnia  Continue other medications/PRNs as noted below.   Continue medical management by [facility] team.  Continue with group therapy, milieu therapy and occupational therapy.    Continue frequent safety checks and vitals per unit protocol.  Discharge disposition: Discharge tomorrow. Bed search in process.    Case discussed with treatment team.  Risks, benefits and possible side effects of Medications: Risks, benefits, and possible side effects of medications have been explained to the patient, who verbalizes understanding.      ------------------------------------------------------------  Subjective:  The patient was evaluated this morning for continuity of care and no acute distress noted throughout the evaluation.  Over the past 24 hours staff noted the patient was resting comfortably with no further complaints of agitation.      Today on exam the patient was doing \"alright\" and reports that his mood has been \"fairly neutral\" and further explained that he is \"neither up or down.\" In terms of irritability, he feels that no one has specifically irritated him in between now and the time we last spoke last Friday 3/1/24. He noted that he has recently been \"dreaming a lot\" which he feels has interfered with sleep. However, he feels that the quality of sleep has been better. He did note that he asked for a dose of Remron to help him fall asleep which he feels helped. " Patient continues to deny any active homicidal ideation, suicidal ideation, or any hallucinations.     Psychiatric Review of Systems:  Behavior over the last 24 hours:  improved  Sleep: improved  Appetite: fair  Medication side effects: No   ROS: all other systems are negative    Progress Toward Goals: Progressing    Current Medications:  Current Facility-Administered Medications   Medication Dose Route Frequency Provider Last Rate    acetaminophen  650 mg Oral Q6H PRN Zakia Resendez, DO      acetaminophen  650 mg Oral Q4H PRN Zakia Resendez, DO      acetaminophen  975 mg Oral Q6H PRN Zakia Mal, DO      aluminum-magnesium hydroxide-simethicone  30 mL Oral Q4H PRN Zakia Resendez, DO      haloperidol lactate  2.5 mg Intramuscular Q4H PRN Max 4/day Zakia Resendez, DO      And    LORazepam  1 mg Intramuscular Q4H PRN Max 4/day Zakia Resendez, DO      And    benztropine  0.5 mg Intramuscular Q4H PRN Max 4/day Zakia Resendez, DO      haloperidol lactate  5 mg Intramuscular Q4H PRN Max 4/day Zakia Resendez, DO      And    LORazepam  2 mg Intramuscular Q4H PRN Max 4/day Zakia Resendez, DO      And    benztropine  1 mg Intramuscular Q4H PRN Max 4/day Zakia Resendez, DO      benztropine  1 mg Intramuscular Q4H PRN Max 6/day Zakiaflores Resendez, DO      benztropine  1 mg Oral Q4H PRN Max 6/day Zakiaflores Resendez, DO      hydrOXYzine HCL  50 mg Oral Q6H PRN Max 4/day Zakia Resendez, DO      Or    diphenhydrAMINE  50 mg Intramuscular Q6H PRN Zakia Resendez, DO      escitalopram  15 mg Oral Daily Kylah Cason, DO      Artificial Tears  1 drop Both Eyes Q3H PRN Zakia Resendez, DO      haloperidol  1 mg Oral Q6H PRN Zakia Resendez, DO      haloperidol  2.5 mg Oral Q4H PRN Max 4/day Zakia Resendez, DO      haloperidol  5 mg Oral Q4H PRN Max 4/day Zakiaflores Resendez, DO      hydrOXYzine HCL  100 mg Oral Q6H PRN Max 4/day Zakia Resendez, DO      Or    LORazepam  2 mg Intramuscular Q6H PRN Zakia Resendez, DO      hydrOXYzine HCL  25 mg Oral  Q6H PRN Max 4/day Zakia Resendez,       hydrOXYzine HCL  75 mg Oral TID Gregory Bennett MD      melatonin  6 mg Oral HS Kylah CasonDO      mirtazapine  7.5 mg Oral HS PRN Gregory Bennett MD      mirtazapine  7.5 mg Oral HS Gregory Bennett MD      polyethylene glycol  17 g Oral Daily PRN Zakia Resendez, DO      propranolol  10 mg Oral Q8H PRN Zakia Resendez, DO      senna-docusate sodium  1 tablet Oral Daily PRN Zakia Resendez, DO         Behavioral Health Medications: all current active meds have been reviewed.    Vital signs in last 24 hours:  Temp:  [97.6 °F (36.4 °C)-97.7 °F (36.5 °C)] 97.6 °F (36.4 °C)  HR:  [63-67] 63  Resp:  [16] 16  BP: (104-113)/(57-63) 113/63    Laboratory results:  I have personally reviewed all pertinent laboratory/tests results.    Mental Status Evaluation:    Appearance:  wearing hospital clothes, looks older than stated age, underweight, bearded, wearing the same shirt daily   Behavior:  cooperative, calm, guarded   Speech:  normal volume, normal pitch, fluent   Mood:  improved   Affect:  appropriate, reactive, mood-congruent   Thought Process:  organized, logical, coherent, goal directed, normal rate of thoughts   Associations: intact associations   Thought Content:  no overt delusions, ruminating thoughts, preoccupied   Perceptual Disturbances: no auditory hallucinations, denies auditory hallucinations when asked, no visual hallucinations, denies visual hallucinations when asked   Risk Potential: Suicidal ideation - None at present  Homicidal ideation - None at present  Potential for aggression - Not at present   Sensorium:  oriented to person, place, and time/date   Memory:  recent and remote memory grossly intact   Consciousness:  alert and awake   Attention/Concentration: attention span and concentration are age appropriate   Insight:  fair   Judgment: fair   Gait/Station: normal gait/station   Motor Activity: no abnormal movements             Recommended  Treatment:   See above for assessment and plan.     Risks, benefits and possible side effects of Medications:   Risks, benefits, and possible side effects of medications explained to patient and patient verbalizes understanding.      Silvano Ortega MS-3

## 2024-03-05 PROCEDURE — 99232 SBSQ HOSP IP/OBS MODERATE 35: CPT | Performed by: STUDENT IN AN ORGANIZED HEALTH CARE EDUCATION/TRAINING PROGRAM

## 2024-03-05 RX ORDER — ARIPIPRAZOLE 5 MG/1
5 TABLET ORAL
Status: DISCONTINUED | OUTPATIENT
Start: 2024-03-05 | End: 2024-03-11 | Stop reason: HOSPADM

## 2024-03-05 RX ADMIN — HYDROXYZINE HYDROCHLORIDE 75 MG: 25 TABLET ORAL at 08:44

## 2024-03-05 RX ADMIN — ESCITALOPRAM OXALATE 15 MG: 5 TABLET, FILM COATED ORAL at 08:44

## 2024-03-05 RX ADMIN — MELATONIN TAB 3 MG 6 MG: 3 TAB at 21:43

## 2024-03-05 RX ADMIN — HYDROXYZINE HYDROCHLORIDE 75 MG: 25 TABLET ORAL at 20:31

## 2024-03-05 RX ADMIN — ACETAMINOPHEN 650 MG: 325 TABLET ORAL at 20:30

## 2024-03-05 RX ADMIN — ARIPIPRAZOLE 5 MG: 5 TABLET ORAL at 21:43

## 2024-03-05 RX ADMIN — MIRTAZAPINE 7.5 MG: 7.5 TABLET, FILM COATED ORAL at 21:43

## 2024-03-05 RX ADMIN — HYDROXYZINE HYDROCHLORIDE 75 MG: 25 TABLET ORAL at 16:23

## 2024-03-05 NOTE — PROGRESS NOTES
"Progress Note - Behavioral Health   Shaquille Ackerman 28 y.o. male MRN: [id number]  Unit/Bed#: U 346-02 Encounter: [id number]    Assessment/Plan   Active Problems:    Marijuana abuse    Anxiety    Insomnia      Recommended Treatment:   Continue Lexapro 15 mg daily for depression/anxiety  Continue Atarax 75 mg 3 times daily for anxiety  Continue Remron 7.5 mg nightly for depression and weight gain  Continue melatonin 6 mg nightly for insomnia  Continue other medications/PRNs as noted below.   Continue medical management by SLIM team.  Continue with group therapy, milieu therapy and occupational therapy.    Continue frequent safety checks and vitals per unit protocol.  Discharge disposition:  Discharge tomorrow. Bed search in progress.    Case discussed with treatment team.  Risks, benefits and possible side effects of Medications: Risks, benefits, and possible side effects of medications have been explained to the patient, who verbalizes understanding.      ------------------------------------------------------------  Subjective:  The patient was evaluated this morning for continuity of care and no acute distress noted throughout the evaluation.  Over the past 24 hours staff noted the patient was calm and cooperative upon approach.      Today on exam the patient was doing \"alright\" and reports his mood as \"neutral.\" When asked about agitation and irritation he says that his \"irritation is not a problem as of right now.\" He seemed in good spirits when he noted that he was able to call his mom and dad; he said that the father is going to \"bring him books when [he is] in rehab.\" When asked if anything else was bothering him, he noted that he \"wants to go to a dentist\" but knows that he has to do that outside at some point. Otherwise, he says he is doing well with everything else. He continues to deny any homicidal ideation, suicidal ideation, or any hallucinations.     Psychiatric Review of Systems:  Behavior over the last " "24 hours:  improved  Sleep: improved, claims to have slept \"pretty well\" yesterday.  Appetite: fair  Medication side effects: No   ROS: all other systems are negative    Progress Toward Goals: Progressing    Current Medications:  Current Facility-Administered Medications   Medication Dose Route Frequency Provider Last Rate    acetaminophen  650 mg Oral Q6H PRN Zakia Resendez, DO      acetaminophen  650 mg Oral Q4H PRN Zakia Resendez, DO      acetaminophen  975 mg Oral Q6H PRN Zakia Resendez, DO      aluminum-magnesium hydroxide-simethicone  30 mL Oral Q4H PRN Zakia Resendez, DO      haloperidol lactate  2.5 mg Intramuscular Q4H PRN Max 4/day Zakia Resendez, DO      And    LORazepam  1 mg Intramuscular Q4H PRN Max 4/day Zakia Resendez, DO      And    benztropine  0.5 mg Intramuscular Q4H PRN Max 4/day Zakia Resendez, DO      haloperidol lactate  5 mg Intramuscular Q4H PRN Max 4/day Zakia Resendez, DO      And    LORazepam  2 mg Intramuscular Q4H PRN Max 4/day Zakia Resendez, DO      And    benztropine  1 mg Intramuscular Q4H PRN Max 4/day Zakia Resendez, DO      benztropine  1 mg Intramuscular Q4H PRN Max 6/day Zakia Resendez, DO      benztropine  1 mg Oral Q4H PRN Max 6/day Zakia Resendez, DO      hydrOXYzine HCL  50 mg Oral Q6H PRN Max 4/day aZkia Resendez, DO      Or    diphenhydrAMINE  50 mg Intramuscular Q6H PRN Zakia Resendez, DO      escitalopram  15 mg Oral Daily Kylah Cason, DO      Artificial Tears  1 drop Both Eyes Q3H PRN Zakia Resendez, DO      haloperidol  1 mg Oral Q6H PRN Zakia Resendez, DO      haloperidol  2.5 mg Oral Q4H PRN Max 4/day Zakia Resendez, DO      haloperidol  5 mg Oral Q4H PRN Max 4/day Zakiaflores Resendez, DO      hydrOXYzine HCL  100 mg Oral Q6H PRN Max 4/day Zakia Resendez, DO      Or    LORazepam  2 mg Intramuscular Q6H PRN Zakia Resendez, DO      hydrOXYzine HCL  25 mg Oral Q6H PRN Max 4/day Zakia Resendez DO      hydrOXYzine HCL  75 mg Oral TID Gregory Bennett MD      melatonin "  6 mg Oral HS Kylah Cason DO      mirtazapine  7.5 mg Oral HS PRN Gregory Bennett MD      mirtazapine  7.5 mg Oral HS Gregory Bennett MD      polyethylene glycol  17 g Oral Daily PRN Zakia Resendez, DO      propranolol  10 mg Oral Q8H PRN Zakia Resendez, DO      senna-docusate sodium  1 tablet Oral Daily PRN Zakia Resendez, DO         Behavioral Health Medications: all current active meds have been reviewed.    Vital signs in last 24 hours:  Temp:  [97.9 °F (36.6 °C)-98.3 °F (36.8 °C)] 97.9 °F (36.6 °C)  HR:  [61-80] 61  Resp:  [16] 16  BP: (105-116)/(56-62) 116/56    Laboratory results:  I have personally reviewed all pertinent laboratory/tests results.    Mental Status Evaluation:    Appearance:  marginal hygiene, dressed in hospital attire, looks stated age, underweight, bearded   Behavior:  cooperative, calm   Speech:  normal rate, normal volume, normal pitch   Mood:  improved   Affect:  reactive, mood-congruent   Thought Process:  organized, logical, coherent, goal directed, normal rate of thoughts, normal abstract reasoning, improved   Associations: intact associations   Thought Content:  no overt delusions, preoccupied   Perceptual Disturbances: no auditory hallucinations, denies auditory hallucinations when asked, no visual hallucinations, denies visual hallucinations when asked   Risk Potential: Suicidal ideation - None at present  Homicidal ideation - None at present  Potential for aggression - Not at present   Sensorium:  oriented to person, place, and time/date   Memory:  recent and remote memory grossly intact   Consciousness:  alert and awake   Attention/Concentration: attention span and concentration are age appropriate   Insight:  improved   Judgment: improved   Gait/Station: normal gait/station   Motor Activity: no abnormal movements             Recommended Treatment:   See above for assessment and plan.     Risks, benefits and possible side effects of Medications:   Risks, benefits,  and possible side effects of medications explained to patient and patient verbalizes understanding.      Silvano Ortega MS-3

## 2024-03-05 NOTE — PLAN OF CARE
Problem: Ineffective Coping  Goal: Identifies ineffective coping skills  Outcome: Progressing  Goal: Identifies healthy coping skills  Outcome: Progressing  Goal: Demonstrates healthy coping skills  Outcome: Progressing  Goal: Participates in unit activities  Description: Interventions:  - Provide therapeutic environment   - Provide required programming   - Redirect inappropriate behaviors   Outcome: Progressing  Goal: Patient/Family participate in treatment and DC plans  Description: Interventions:  - Provide therapeutic environment  Outcome: Progressing  Goal: Patient/Family verbalizes awareness of resources  Outcome: Progressing  Goal: Understands least restrictive measures  Description: Interventions:  - Utilize least restrictive behavior  Outcome: Progressing  Goal: Free from restraint events  Description: - Utilize least restrictive measures   - Provide behavioral interventions   - Redirect inappropriate behaviors   Outcome: Progressing     Problem: Depression  Goal: Treatment Goal: Demonstrate behavioral control of depressive symptoms, verbalize feelings of improved mood/affect, and adopt new coping skills prior to discharge  Outcome: Progressing  Goal: Verbalize thoughts and feelings  Description: Interventions:  - Assess and re-assess patient's level of risk   - Engage patient in 1:1 interactions, daily, for a minimum of 15 minutes   - Encourage patient to express feelings, fears, frustrations, hopes   Outcome: Progressing  Goal: Refrain from harming self  Description: Interventions:  - Monitor patient closely, per order   - Supervise medication ingestion, monitor effects and side effects   Outcome: Progressing  Goal: Refrain from isolation  Description: Interventions:  - Develop a trusting relationship   - Encourage socialization   Outcome: Progressing  Goal: Refrain from self-neglect  Outcome: Progressing  Goal: Attend and participate in unit activities, including therapeutic, recreational, and  educational groups  Description: Interventions:  - Provide therapeutic and educational activities daily, encourage attendance and participation, and document same in the medical record   Outcome: Progressing  Goal: Complete daily ADLs, including personal hygiene independently, as able  Description: Interventions:  - Observe, teach, and assist patient with ADLS  -  Monitor and promote a balance of rest/activity, with adequate nutrition and elimination   Outcome: Progressing     Problem: Anxiety  Goal: Anxiety is at manageable level  Description: Interventions:  - Assess and monitor patient's anxiety level.   - Monitor for signs and symptoms (heart palpitations, chest pain, shortness of breath, headaches, nausea, feeling jumpy, restlessness, irritable, apprehensive).   - Collaborate with interdisciplinary team and initiate plan and interventions as ordered.  - [place] patient to unit/surroundings  - Explain treatment plan  - Encourage participation in care  - Encourage verbalization of concerns/fears  - Identify coping mechanisms  - Assist in developing anxiety-reducing skills  - Administer/offer alternative therapies  - Limit or eliminate stimulants  Outcome: Progressing     Problem: Nutrition/Hydration-ADULT  Goal: Nutrient/Hydration intake appropriate for improving, restoring or maintaining nutritional needs  Description: Monitor and assess patient's nutrition/hydration status for malnutrition. Collaborate with interdisciplinary team and initiate plan and interventions as ordered.  Monitor patient's weight and dietary intake as ordered or per policy. Utilize nutrition screening tool and intervene as necessary. Determine patient's food preferences and provide high-protein, high-caloric foods as appropriate.     INTERVENTIONS:  - Monitor oral intake, urinary output, labs, and treatment plans  - Assess nutrition and hydration status and recommend course of action  - Evaluate amount of meals eaten  - Assist patient with  eating if necessary   - Allow adequate time for meals  - Recommend/ encourage appropriate diets, oral nutritional supplements, and vitamin/mineral supplements  - Order, calculate, and assess calorie counts as needed  - Recommend, monitor, and adjust tube feedings and TPN/PPN based on assessed needs  - Assess need for intravenous fluids  - Provide specific nutrition/hydration education as appropriate  - Include patient/family/caregiver in decisions related to nutrition  Outcome: Progressing     Problem: DISCHARGE PLANNING  Goal: Discharge to home or other facility with appropriate resources  Description: INTERVENTIONS:  - Identify barriers to discharge w/patient and caregiver  - Arrange for needed discharge resources and transportation as appropriate  - Identify discharge learning needs (meds, wound care, etc.)  - Arrange for interpretive services to assist at discharge as needed  - Refer to Case Management Department for coordinating discharge planning if the patient needs post-hospital services based on physician/advanced practitioner order or complex needs related to functional status, cognitive ability, or social support system  Outcome: Progressing

## 2024-03-05 NOTE — SOCIAL WORK
SW spoke with pt's mom to provide update on insurance and rehab bed search. Mother verbalized understanding.

## 2024-03-05 NOTE — SOCIAL WORK
Pt under review with [facility] for rehab placement.     Pt denied from [facility] d/t recent use of Haldol PRN for agitation. [facility] staff cited concerns regarding pt's agitation and behavioral issues.     SW reached out to [facility] to determine what other facilities they are contracted with for Replaced by [facility] services. Awaiting return call.

## 2024-03-05 NOTE — NURSING NOTE
Pt verbally denies SI, HI and A/V hallucinations. Observed as sitting quietly in proximity to select peers, cooperative and compliant with meds. Wearing hospital attire by choice. [place], scant responses to assessment questions. No questions or concerns verbalized.

## 2024-03-05 NOTE — NURSING NOTE
"Patient's mother called this evening following a conversation with her son in which he told her he was being discharged to rehab in a.m.  She would like to speak to  as soon as possible  \"I'm a little confused\" she said. Since she said \"I usually just get a machine and it took me 3 weeks to get to speak to her\"  it was suggested to her she call nurses station in a.m.  "

## 2024-03-05 NOTE — PROGRESS NOTES
03/05/24 1014   Team Meeting   Meeting Type Daily Rounds   Team Members Present   Team Members Present Physician;Nurse;   Physician Team Member Sabrina   [facility]   Care Management Team Member Dawood   Patient/Family Present   Patient Present No   Patient's Family Present No     Pt is calm, cooperative, visible, and med/meal compliant. Discharge pending rehab placement.

## 2024-03-06 PROCEDURE — 99232 SBSQ HOSP IP/OBS MODERATE 35: CPT | Performed by: STUDENT IN AN ORGANIZED HEALTH CARE EDUCATION/TRAINING PROGRAM

## 2024-03-06 RX ADMIN — ARIPIPRAZOLE 5 MG: 5 TABLET ORAL at 21:26

## 2024-03-06 RX ADMIN — HYDROXYZINE HYDROCHLORIDE 75 MG: 25 TABLET ORAL at 08:52

## 2024-03-06 RX ADMIN — ACETAMINOPHEN 650 MG: 325 TABLET ORAL at 21:27

## 2024-03-06 RX ADMIN — MIRTAZAPINE 7.5 MG: 7.5 TABLET, FILM COATED ORAL at 21:26

## 2024-03-06 RX ADMIN — HYDROXYZINE HYDROCHLORIDE 75 MG: 25 TABLET ORAL at 21:27

## 2024-03-06 RX ADMIN — HYDROXYZINE HYDROCHLORIDE 75 MG: 25 TABLET ORAL at 16:59

## 2024-03-06 RX ADMIN — ESCITALOPRAM OXALATE 15 MG: 5 TABLET, FILM COATED ORAL at 08:52

## 2024-03-06 RX ADMIN — ALUMINUM HYDROXIDE, MAGNESIUM HYDROXIDE, AND DIMETHICONE 30 ML: 200; 20; 200 SUSPENSION ORAL at 14:19

## 2024-03-06 RX ADMIN — MELATONIN TAB 3 MG 6 MG: 3 TAB at 21:26

## 2024-03-06 NOTE — NURSING NOTE
Pt verbally denies SI, HI and A/V hallucinations. Appears in personal attire by choice, guarded approach but appropriate behaviors. No notable signs of agitation or mood instability, responses are concrete and scant; reality based. Compliant with meds.

## 2024-03-06 NOTE — NURSING NOTE
Pt presented to RN with symptoms of nausea related to dietary heartburn; recommended and received PO PRN 30 mL Maalox.

## 2024-03-06 NOTE — PROGRESS NOTES
"Progress Note - Behavioral Health   Shaquille Ackerman 28 y.o. male MRN: [id number]  Unit/Bed#: U 346-02 Encounter: [id number]    Assessment/Plan   Active Problems:    Marijuana abuse    Anxiety    Insomnia      Recommended Treatment:   Continue Lexapro 15 mg daily for depression/anxiety  Continue Abilify 5 mg nightly for irritation  Continue Remron 7.5 mg nightly for depression and weight gain  Continue Atarax 75 mg 3 times daily for anxiety  Continue melatonin 6 mg nightly for insomnia  Continue other medications/PRNs as noted below.  Continue medical management by SLIM team.  Continue with group therapy, milieu therapy and occupational therapy.    Continue frequent safety checks and vitals per unit protocol.  Discharge disposition:  Discharge pending. Bed search in progress.    Case discussed with treatment team.  Risks, benefits and possible side effects of Medications: Risks, benefits, and possible side effects of medications have been explained to the patient, who verbalizes understanding.      ------------------------------------------------------------  Subjective:  The patient was evaluated this morning for continuity of care and no acute distress noted throughout the evaluation.  Over the past 24 hours staff noted the patient was experiencing a headache and stomach ache that was controlled on tylenol.     Today on exam the patient was doing \"okay\" and reports wanting a COVID test because of a headache and stomach ache that he was experiencing yesterday. After administration of tylenol PRN, he has felt better with no more headache and stomach ache significantly decreasing. Today, patient appeared a lot more anxious and scattered brain as a result. When asking a question he would start to answer, but then start going into a completely unrelated topic regarding his future and disposition. When asked about his mood, he started by saying he was \"doing okay,\" but then started talking about things he would like to do " "sober and a game called \"ghost\" and then start recounting a time with his friends in the past. When asked about suicidal ideations, he started saying he was \"discontent\" and continued to elaborate it was because of multiple things like thinking of work, dental problems, and his \"autism.\" Regardless, Shaquille continued to deny suicidal ideation, homicidal ideation, and any hallucinations.     Psychiatric Review of Systems:  Behavior over the last 24 hours:  unchanged  Sleep: improved, slept 7 -  8 hours  Appetite: fair  Medication side effects: No   ROS: all other systems are negative    Progress Toward Goals: progressing    Current Medications:  Current Facility-Administered Medications   Medication Dose Route Frequency Provider Last Rate    acetaminophen  650 mg Oral Q6H PRN Zakia Resendez, DO      acetaminophen  650 mg Oral Q4H PRN Zakia Resendez, DO      acetaminophen  975 mg Oral Q6H PRN Zakia Resendez, DO      aluminum-magnesium hydroxide-simethicone  30 mL Oral Q4H PRN Zakia Resendez, DO      ARIPiprazole  5 mg Oral HS Gregory Bennett MD      haloperidol lactate  2.5 mg Intramuscular Q4H PRN Max 4/day Zakia Resendez, DO      And    LORazepam  1 mg Intramuscular Q4H PRN Max 4/day Zakia Resendez, DO      And    benztropine  0.5 mg Intramuscular Q4H PRN Max 4/day Zakia Resendez, DO      haloperidol lactate  5 mg Intramuscular Q4H PRN Max 4/day Zakia Resendez, DO      And    LORazepam  2 mg Intramuscular Q4H PRN Max 4/day Zakia Resendez, DO      And    benztropine  1 mg Intramuscular Q4H PRN Max 4/day Zakiaflores Resendez, DO      benztropine  1 mg Intramuscular Q4H PRN Max 6/day Zakiaflores Resendez, DO      benztropine  1 mg Oral Q4H PRN Max 6/day Zakia Resendez, DO      hydrOXYzine HCL  50 mg Oral Q6H PRN Max 4/day Zakia Resendez, DO      Or    diphenhydrAMINE  50 mg Intramuscular Q6H PRN Zakia Resendez, DO      escitalopram  15 mg Oral Daily Kylah Cason DO      Artificial Tears  1 drop Both Eyes Q3H PRN Zakia" Mal, DO      haloperidol  1 mg Oral Q6H PRN Zakia Resendez, DO      haloperidol  2.5 mg Oral Q4H PRN Max 4/day Zakia Resendez, DO      haloperidol  5 mg Oral Q4H PRN Max 4/day Zakia Resendez, DO      hydrOXYzine HCL  100 mg Oral Q6H PRN Max 4/day Zakia Resendez, DO      Or    LORazepam  2 mg Intramuscular Q6H PRN Zakia Resendez, DO      hydrOXYzine HCL  25 mg Oral Q6H PRN Max 4/day Zakia Resendez, DO      hydrOXYzine HCL  75 mg Oral TID Gregory Bennett MD      melatonin  6 mg Oral HS Kylah Harach, DO      mirtazapine  7.5 mg Oral HS PRN Gregory Bennett MD      mirtazapine  7.5 mg Oral HS Gregory Bennett MD      polyethylene glycol  17 g Oral Daily PRN Zakia Resendez, DO      propranolol  10 mg Oral Q8H PRN Zakia Resendez, DO      senna-docusate sodium  1 tablet Oral Daily PRN Zakia Resendez, DO         Behavioral Health Medications: all current active meds have been reviewed.    Vital signs in last 24 hours:  Temp:  [97.8 °F (36.6 °C)-98 °F (36.7 °C)] 98 °F (36.7 °C)  HR:  [67-78] 67  Resp:  [16] 16  BP: (108-123)/(59-70) 123/59    Laboratory results:  I have personally reviewed all pertinent laboratory/tests results.    Mental Status Evaluation:    Appearance:  disheveled, looks stated age, underweight, bearded   Behavior:  pleasant, cooperative, calm   Speech:  clear, coherent, disorganized   Mood:  anxious   Affect:  normal range and intensity   Thought Process:  normal rate of thoughts, disorganized, circumstantial, flight of ideas   Associations: intact associations   Thought Content:  ruminating thoughts, preoccupied with future and disposition   Perceptual Disturbances: no auditory hallucinations, denies auditory hallucinations when asked, no visual hallucinations, denies visual hallucinations when asked   Risk Potential: Suicidal ideation - None at present  Homicidal ideation - None at present  Potential for aggression - No   Sensorium:  oriented to person, place, and time/date   Memory:   recent and remote memory grossly intact   Consciousness:  alert and awake   Attention/Concentration: attention span and concentration are age appropriate   Insight:  fair   Judgment: fair   Gait/Station: normal gait/station   Motor Activity: no abnormal movements             Recommended Treatment:   See above for assessment and plan.     Risks, benefits and possible side effects of Medications:   Risks, benefits, and possible side effects of medications explained to patient and patient verbalizes understanding.      Silvano Ortega MS-3

## 2024-03-06 NOTE — NURSING NOTE
30 mL of Maalox was effective in reducing heartburn symptoms on reassessment. Pt is no longer presenting with this concern at this time.

## 2024-03-06 NOTE — PLAN OF CARE
Problem: Ineffective Coping  Goal: Participates in unit activities  Description: Interventions:  - Provide therapeutic environment   - Provide required programming   - Redirect inappropriate behaviors   Outcome: Progressing     Problem: Depression  Goal: Attend and participate in unit activities, including therapeutic, recreational, and educational groups  Description: Interventions:  - Provide therapeutic and educational activities daily, encourage attendance and participation, and document same in the medical record   Outcome: Progressing   Patient is active in groups

## 2024-03-06 NOTE — PLAN OF CARE
Problem: Ineffective Coping  Goal: Identifies ineffective coping skills  Outcome: Progressing  Goal: Identifies healthy coping skills  Outcome: Progressing  Goal: Demonstrates healthy coping skills  Outcome: Progressing  Goal: Participates in unit activities  Description: Interventions:  - Provide therapeutic environment   - Provide required programming   - Redirect inappropriate behaviors   Outcome: Progressing  Goal: Patient/Family participate in treatment and DC plans  Description: Interventions:  - Provide therapeutic environment  Outcome: Progressing  Goal: Patient/Family verbalizes awareness of resources  Outcome: Progressing  Goal: Understands least restrictive measures  Description: Interventions:  - Utilize least restrictive behavior  Outcome: Progressing  Goal: Free from restraint events  Description: - Utilize least restrictive measures   - Provide behavioral interventions   - Redirect inappropriate behaviors   Outcome: Progressing     Problem: Depression  Goal: Treatment Goal: Demonstrate behavioral control of depressive symptoms, verbalize feelings of improved mood/affect, and adopt new coping skills prior to discharge  Outcome: Progressing  Goal: Verbalize thoughts and feelings  Description: Interventions:  - Assess and re-assess patient's level of risk   - Engage patient in 1:1 interactions, daily, for a minimum of 15 minutes   - Encourage patient to express feelings, fears, frustrations, hopes   Outcome: Progressing  Goal: Refrain from harming self  Description: Interventions:  - Monitor patient closely, per order   - Supervise medication ingestion, monitor effects and side effects   Outcome: Progressing  Goal: Refrain from isolation  Description: Interventions:  - Develop a trusting relationship   - Encourage socialization   Outcome: Progressing  Goal: Refrain from self-neglect  Outcome: Progressing  Goal: Attend and participate in unit activities, including therapeutic, recreational, and  educational groups  Description: Interventions:  - Provide therapeutic and educational activities daily, encourage attendance and participation, and document same in the medical record   Outcome: Progressing  Goal: Complete daily ADLs, including personal hygiene independently, as able  Description: Interventions:  - Observe, teach, and assist patient with ADLS  -  Monitor and promote a balance of rest/activity, with adequate nutrition and elimination   Outcome: Progressing     Problem: Anxiety  Goal: Anxiety is at manageable level  Description: Interventions:  - Assess and monitor patient's anxiety level.   - Monitor for signs and symptoms (heart palpitations, chest pain, shortness of breath, headaches, nausea, feeling jumpy, restlessness, irritable, apprehensive).   - Collaborate with interdisciplinary team and initiate plan and interventions as ordered.  - [place] patient to unit/surroundings  - Explain treatment plan  - Encourage participation in care  - Encourage verbalization of concerns/fears  - Identify coping mechanisms  - Assist in developing anxiety-reducing skills  - Administer/offer alternative therapies  - Limit or eliminate stimulants  Outcome: Progressing     Problem: Nutrition/Hydration-ADULT  Goal: Nutrient/Hydration intake appropriate for improving, restoring or maintaining nutritional needs  Description: Monitor and assess patient's nutrition/hydration status for malnutrition. Collaborate with interdisciplinary team and initiate plan and interventions as ordered.  Monitor patient's weight and dietary intake as ordered or per policy. Utilize nutrition screening tool and intervene as necessary. Determine patient's food preferences and provide high-protein, high-caloric foods as appropriate.     INTERVENTIONS:  - Monitor oral intake, urinary output, labs, and treatment plans  - Assess nutrition and hydration status and recommend course of action  - Evaluate amount of meals eaten  - Assist patient with  eating if necessary   - Allow adequate time for meals  - Recommend/ encourage appropriate diets, oral nutritional supplements, and vitamin/mineral supplements  - Order, calculate, and assess calorie counts as needed  - Recommend, monitor, and adjust tube feedings and TPN/PPN based on assessed needs  - Assess need for intravenous fluids  - Provide specific nutrition/hydration education as appropriate  - Include patient/family/caregiver in decisions related to nutrition  Outcome: Progressing

## 2024-03-06 NOTE — PROGRESS NOTES
03/06/24 0909   Team Meeting   Meeting Type Daily Rounds   Team Members Present   Team Members Present Physician;Nurse;   Physician Team Member Sabrina   Nursing Team Member [facility]   Care Management Team Member Kimberley   Patient/Family Present   Patient Present No   Patient's Family Present No     Pt med/meal compliant, visible on the unit. Discharge to be determined.

## 2024-03-07 PROCEDURE — 99232 SBSQ HOSP IP/OBS MODERATE 35: CPT | Performed by: STUDENT IN AN ORGANIZED HEALTH CARE EDUCATION/TRAINING PROGRAM

## 2024-03-07 RX ADMIN — MIRTAZAPINE 7.5 MG: 7.5 TABLET, FILM COATED ORAL at 21:41

## 2024-03-07 RX ADMIN — ESCITALOPRAM OXALATE 15 MG: 5 TABLET, FILM COATED ORAL at 08:36

## 2024-03-07 RX ADMIN — HYDROXYZINE HYDROCHLORIDE 75 MG: 25 TABLET ORAL at 17:00

## 2024-03-07 RX ADMIN — ARIPIPRAZOLE 5 MG: 5 TABLET ORAL at 21:41

## 2024-03-07 RX ADMIN — HYDROXYZINE HYDROCHLORIDE 75 MG: 25 TABLET ORAL at 21:41

## 2024-03-07 RX ADMIN — ALUMINUM HYDROXIDE, MAGNESIUM HYDROXIDE, AND DIMETHICONE 30 ML: 200; 20; 200 SUSPENSION ORAL at 20:25

## 2024-03-07 RX ADMIN — MELATONIN TAB 3 MG 6 MG: 3 TAB at 21:41

## 2024-03-07 RX ADMIN — HYDROXYZINE HYDROCHLORIDE 75 MG: 25 TABLET ORAL at 08:36

## 2024-03-07 NOTE — NURSING NOTE
Patient is visible on unit. Patient is calm and cooperative upon approach. Patient guarded with poor eye contact. Patient denies SI/HI/AH/VH. Patient is compliant with meds and meals.

## 2024-03-07 NOTE — PROGRESS NOTES
03/07/24 0910   Team Meeting   Meeting Type Daily Rounds   Team Members Present   Team Members Present Physician;Nurse;   Physician Team Member Sabrina   Nursing Team Member [facility]   Care Management Team Member Kimberley   Patient/Family Present   Patient Present No   Patient's Family Present No     Visible on the unit, social with select peers. Pt appropriately expressing boundaries in group. Abilify to be increased tomorrow. Bed search to resume Monday.

## 2024-03-07 NOTE — PLAN OF CARE
Problem: Ineffective Coping  Goal: Identifies ineffective coping skills  Outcome: Progressing  Goal: Identifies healthy coping skills  Outcome: Progressing  Goal: Demonstrates healthy coping skills  Outcome: Progressing  Goal: Participates in unit activities  Description: Interventions:  - Provide therapeutic environment   - Provide required programming   - Redirect inappropriate behaviors   Outcome: Progressing  Goal: Patient/Family participate in treatment and DC plans  Description: Interventions:  - Provide therapeutic environment  Outcome: Progressing  Goal: Patient/Family verbalizes awareness of resources  Outcome: Progressing  Goal: Understands least restrictive measures  Description: Interventions:  - Utilize least restrictive behavior  Outcome: Progressing  Goal: Free from restraint events  Description: - Utilize least restrictive measures   - Provide behavioral interventions   - Redirect inappropriate behaviors   Outcome: Progressing     Problem: Depression  Goal: Treatment Goal: Demonstrate behavioral control of depressive symptoms, verbalize feelings of improved mood/affect, and adopt new coping skills prior to discharge  Outcome: Progressing  Goal: Verbalize thoughts and feelings  Description: Interventions:  - Assess and re-assess patient's level of risk   - Engage patient in 1:1 interactions, daily, for a minimum of 15 minutes   - Encourage patient to express feelings, fears, frustrations, hopes   Outcome: Progressing  Goal: Refrain from harming self  Description: Interventions:  - Monitor patient closely, per order   - Supervise medication ingestion, monitor effects and side effects   Outcome: Progressing  Goal: Refrain from isolation  Description: Interventions:  - Develop a trusting relationship   - Encourage socialization   Outcome: Progressing  Goal: Refrain from self-neglect  Outcome: Progressing  Goal: Attend and participate in unit activities, including therapeutic, recreational, and  educational groups  Description: Interventions:  - Provide therapeutic and educational activities daily, encourage attendance and participation, and document same in the medical record   Outcome: Progressing  Goal: Complete daily ADLs, including personal hygiene independently, as able  Description: Interventions:  - Observe, teach, and assist patient with ADLS  -  Monitor and promote a balance of rest/activity, with adequate nutrition and elimination   Outcome: Progressing     Problem: Anxiety  Goal: Anxiety is at manageable level  Description: Interventions:  - Assess and monitor patient's anxiety level.   - Monitor for signs and symptoms (heart palpitations, chest pain, shortness of breath, headaches, nausea, feeling jumpy, restlessness, irritable, apprehensive).   - Collaborate with interdisciplinary team and initiate plan and interventions as ordered.  - [place] patient to unit/surroundings  - Explain treatment plan  - Encourage participation in care  - Encourage verbalization of concerns/fears  - Identify coping mechanisms  - Assist in developing anxiety-reducing skills  - Administer/offer alternative therapies  - Limit or eliminate stimulants  Outcome: Progressing     Problem: Nutrition/Hydration-ADULT  Goal: Nutrient/Hydration intake appropriate for improving, restoring or maintaining nutritional needs  Description: Monitor and assess patient's nutrition/hydration status for malnutrition. Collaborate with interdisciplinary team and initiate plan and interventions as ordered.  Monitor patient's weight and dietary intake as ordered or per policy. Utilize nutrition screening tool and intervene as necessary. Determine patient's food preferences and provide high-protein, high-caloric foods as appropriate.     INTERVENTIONS:  - Monitor oral intake, urinary output, labs, and treatment plans  - Assess nutrition and hydration status and recommend course of action  - Evaluate amount of meals eaten  - Assist patient with  eating if necessary   - Allow adequate time for meals  - Recommend/ encourage appropriate diets, oral nutritional supplements, and vitamin/mineral supplements  - Order, calculate, and assess calorie counts as needed  - Recommend, monitor, and adjust tube feedings and TPN/PPN based on assessed needs  - Assess need for intravenous fluids  - Provide specific nutrition/hydration education as appropriate  - Include patient/family/caregiver in decisions related to nutrition  Outcome: Progressing

## 2024-03-07 NOTE — PROGRESS NOTES
03/01/24 1221   Team Meeting   Meeting Type Tx Team Meeting   Initial Conference Date 03/01/24   Team Members Present   Team Members Present Physician;Nurse;   Physician Team Member Sabrina   Nursing Team Member [facility] Team Member Kimberley   Patient/Family Present   Patient Present Yes   Patient's Family Present No     Tx plan was reviewed and discussed with Pt. Pt was encouraged to attend groups. Medication was discussed with Pt. Pt signed tx plan.

## 2024-03-07 NOTE — NURSING NOTE
"Pt visible on milieu, social with select peers and staff. Pt appeared guarded on approach. Pt denied feeling anxious or depressed, denies SI/HI/AH/VH. Pt did express desire to want to stay another week on the unit rather than going to rehab. Pt stated he felt he \"needed to learn how to express more emotion\" and learn \"how to not rely on alcohol or marijuana when feeling stressed\". This writer provided therapeutic communication, listened to Pt, and educated Pt on benefits of rehab concerning those specific preoccupations. Pt adherent with scheduled medication administration. PRN Tylenol 650mg given at 2130 for 4/10 headache pain. Pt is comfortably resting in bed at 2230, medication effective.    "

## 2024-03-07 NOTE — PROGRESS NOTES
"Progress Note - Behavioral Health   Shaquille Ackerman 28 y.o. male MRN: [id number]  Unit/Bed#: [facility] 346-02 Encounter: [id number]    Subjective:   Per nursing report, patient has been doing fairly well.  He has been calm and cooperative on the unit.  He remains appropriate in groups.  He has been medication compliant.  He has been without acute behavioral issues. Patient is doing better today.  He appears brighter and less anxious.  He does have some continued anxiety about discharge plans.  He denies any SI, HI, or AVH.  He reports sleep was overall good but with some bizarre dreams.  He denies any problems tolerating medications.  He denies questions or concerns.    Behavior over the last 24 hours:  unchanged  Sleep: normal  Appetite: normal  Medication side effects: No  ROS: no complaints and all other systems are negative    Mental Status Evaluation:  Appearance:  dressed appropriately and disheveled   Behavior:  calm, pleasant, cooperative, and guarded   Speech:  normal rate and volume   Mood:  \"alright\"   Affect:  brighter, still somewhat constricted   Thought Process:  linear and goal directed   Associations: concrete associations   Thought Content:  no overt delusions   Perceptual Disturbances: denies auditory or visual hallucinations when asked, does not appear responding to internal stimuli   Risk Potential: Suicidal ideation - None  Homicidal ideation - None  Potential for aggression - Not at present   Sensorium:  oriented to person, place, and time/date   Memory:  recent and remote memory grossly intact   Consciousness:  alert and awake   Attention/Concentration: attention span and concentration are age appropriate   Insight:  limited   Judgment: limited   Gait/Station: normal gait/station, normal balance   Motor Activity: no abnormal movements     Medications: all current active meds have been reviewed.  Current Facility-Administered Medications   Medication Dose Route Frequency Provider Last Rate    " acetaminophen  650 mg Oral Q6H PRN Zakia Resendez, DO      acetaminophen  650 mg Oral Q4H PRN Zakia Resendez, DO      acetaminophen  975 mg Oral Q6H PRN Zakia Resendez, DO      aluminum-magnesium hydroxide-simethicone  30 mL Oral Q4H PRN Zakia Resendez, DO      ARIPiprazole  5 mg Oral HS Gregory Bennett MD      haloperidol lactate  2.5 mg Intramuscular Q4H PRN Max 4/day Zakia Resendez, DO      And    LORazepam  1 mg Intramuscular Q4H PRN Max 4/day Zakia Resendez, DO      And    benztropine  0.5 mg Intramuscular Q4H PRN Max 4/day Zakia Resendez, DO      haloperidol lactate  5 mg Intramuscular Q4H PRN Max 4/day Zakia Resendez, DO      And    LORazepam  2 mg Intramuscular Q4H PRN Max 4/day Zakia Resendez, DO      And    benztropine  1 mg Intramuscular Q4H PRN Max 4/day Zakia Resendez, DO      benztropine  1 mg Intramuscular Q4H PRN Max 6/day Zakia Resendez, DO      benztropine  1 mg Oral Q4H PRN Max 6/day Zakia Resendez, DO      hydrOXYzine HCL  50 mg Oral Q6H PRN Max 4/day Zakia Resendez, DO      Or    diphenhydrAMINE  50 mg Intramuscular Q6H PRN Zakia Resendez, DO      escitalopram  15 mg Oral Daily Kylah Cason, DO      Artificial Tears  1 drop Both Eyes Q3H PRN Zakia Resendez, DO      haloperidol  1 mg Oral Q6H PRN Zakia Resendez, DO      haloperidol  2.5 mg Oral Q4H PRN Max 4/day Zakia Resendez, DO      haloperidol  5 mg Oral Q4H PRN Max 4/day Zakia Resendez, DO      hydrOXYzine HCL  100 mg Oral Q6H PRN Max 4/day Zakia Resendez, DO      Or    LORazepam  2 mg Intramuscular Q6H PRN Zakia Resendez, DO      hydrOXYzine HCL  25 mg Oral Q6H PRN Max 4/day Zakia Resendez, DO      hydrOXYzine HCL  75 mg Oral TID Gregory Bennett MD      melatonin  6 mg Oral HS Kylah Cason DO      mirtazapine  7.5 mg Oral HS PRN Gregory Bennett MD      mirtazapine  7.5 mg Oral HS Gregory Bennett MD      polyethylene glycol  17 g Oral Daily PRN Zakia Resendez DO      propranolol  10 mg Oral Q8H PRN Zakia Resendez,  DO      senna-docusate sodium  1 tablet Oral Daily PRN Zakia Resendez DO         Labs: I have personally reviewed all pertinent laboratory/tests results  Most Recent Labs:   Lab Results   Component Value Date    WBC 4.42 02/01/2024    RBC 4.88 02/01/2024    HGB 15.3 02/01/2024    HCT 44.4 02/01/2024     02/01/2024    RDW 11.9 02/01/2024    NEUTROABS 2.87 02/01/2024    SODIUM 141 02/07/2024    K 4.0 02/07/2024     02/07/2024    CO2 31 02/07/2024    BUN 20 02/07/2024    CREATININE 1.10 02/07/2024    GLUC 79 02/07/2024    CALCIUM 9.4 02/07/2024    AST 12 (L) 02/07/2024    ALT 9 02/07/2024    ALKPHOS 37 02/07/2024    TP 5.9 (L) 02/07/2024    ALB 4.0 02/07/2024    TBILI 0.43 02/07/2024    [id number] 0.968 02/01/2024         Assessment/Plan   Active Problems:    Marijuana abuse    Anxiety    Insomnia    Recommended Treatment:   Continue Lexapro 15 mg daily.  Continue Abilify 5 mg nightly.  Continue Remeron 7.5 mg nightly.  Continue Atarax 75 mg 3 times daily.  Continue melatonin 6 mg nightly.  Continue all other medications at current doses as above.  Encourage group therapy, milieu therapy and occupational therapy  All current active medications have been reviewed  Encourage group therapy, milieu therapy and occupational therapy  Behavioral Health checks every 7 minutes    ----------------------------------------    Progress Toward Goals: progressing    Risks / Benefits of Treatment:    Risks, benefits, and possible side effects of medications explained to patient and patient verbalizes understanding and agreement for treatment.    Counseling / Coordination of Care:    Patient's progress discussed with staff in treatment team meeting.  Medications, treatment progress and treatment plan reviewed with patient.    Gregory Bennett MD 03/07/24

## 2024-03-08 PROCEDURE — 99232 SBSQ HOSP IP/OBS MODERATE 35: CPT | Performed by: PSYCHIATRY & NEUROLOGY

## 2024-03-08 RX ADMIN — MELATONIN TAB 3 MG 6 MG: 3 TAB at 21:16

## 2024-03-08 RX ADMIN — HYDROXYZINE HYDROCHLORIDE 75 MG: 25 TABLET ORAL at 09:57

## 2024-03-08 RX ADMIN — HYDROXYZINE HYDROCHLORIDE 75 MG: 25 TABLET ORAL at 15:33

## 2024-03-08 RX ADMIN — MIRTAZAPINE 7.5 MG: 7.5 TABLET, FILM COATED ORAL at 21:16

## 2024-03-08 RX ADMIN — ARIPIPRAZOLE 5 MG: 5 TABLET ORAL at 21:16

## 2024-03-08 RX ADMIN — HYDROXYZINE HYDROCHLORIDE 75 MG: 25 TABLET ORAL at 21:16

## 2024-03-08 RX ADMIN — ESCITALOPRAM OXALATE 15 MG: 5 TABLET, FILM COATED ORAL at 09:57

## 2024-03-08 RX ADMIN — MIRTAZAPINE 7.5 MG: 7.5 TABLET, FILM COATED ORAL at 01:16

## 2024-03-08 NOTE — NURSING NOTE
Patient was assessed in room during medication administration, patient reports no AVH/SI/HI. Patient has had no behavioral issues to note. Patient has been med compliant. Patient states they have no further needs at this time. Patient denies feeling anxious or depressed at this time.

## 2024-03-08 NOTE — TREATMENT TEAM
03/08/24 0749   Team Meeting   Meeting Type Daily Rounds   Initial Conference Date 03/08/24   Team Members Present   Team Members Present Physician;Nurse;   Physician Team Member Valarie Tucker PA-C   Nursing Team Member Margo   Care Management Team Member Susan Cordero   Patient/Family Present   Patient Present No   Patient's Family Present No   Pharmacy: [facility]    The patient is cooperative with care, remains withdrawn mostly to self. Medication complaint. CM will refer the patient to the [facility] program for IP Rehab placement on Monday 3/11/24.

## 2024-03-08 NOTE — NURSING NOTE
Patient began screaming on phone at mother during upsetting phone call. Patient was redirectable and ended phone call. Patient was spoken to, and was able to deescalate self and remain in control. Patient did not require medication.

## 2024-03-08 NOTE — PROGRESS NOTES
"Progress Note - Behavioral Health     Shaquille Ackerman 28 y.o. male MRN: [id number]   Unit/Bed#: [facility] 346-02 Encounter: [id number]    Behavior over the last 24 hours: unchanged.     Shaquille is a 28-year-old male diagnosed with anxiety and insomnia seen today in follow-up.  Per staff, did report some difficulty sleeping last evening and received Remeron PRN.  Otherwise has been social, generally cooperative, calm and pleasant throughout the milieu.  He is agreeable to participate with interview today.  He is dressed in hospital attire with fair self-care.  He does report ongoing depression, anxiety symptoms but reports some gradual improvement.  Does mention to writer multiple times that he had been abusing benzodiazepines and alcohol inappropriately.  Does seem anxious throughout, nervously laughing at times and rambling. Does elude that abusing substances has been helpful in terms of his thoughts, which tend to race and be, \"all over the place.\" Insight still limited, \"I can't promise I wont touch marijuana again... but definitely not klonopin or alcohol.\" Denies SI/HI/AVH. Still in agreement with rehab placement at present.     Sleep: difficulty falling asleep  Appetite: normal  Medication side effects: No   ROS: no complaints, all other systems are negative  PRNs: Remeron 7.5 mg p.o. at 0116 for insomnia    Mental Status Evaluation:    Appearance:  age appropriate, casually dressed, dressed appropriately, adequate grooming   Behavior:  cooperative, calm, anxious   Speech:  normal volume, rambling    Mood:  depressed, anxious   Affect:  mood-congruent   Thought Process:  Rambling, disorganized at times   Associations: concrete associations, perseveration   Thought Content:  no overt delusions   Perceptual Disturbances: no auditory hallucinations, no visual hallucinations   Risk Potential: Suicidal ideation - None at present  Homicidal ideation - None at present  Potential for aggression - No   Sensorium:  oriented to " person, place, and time/date   Memory:  recent and remote memory grossly intact   Consciousness:  alert and awake   Attention/Concentration: attention span and concentration are age appropriate   Insight:  limited   Judgment: limited   Gait/Station: normal gait/station   Motor Activity: no abnormal movements     Vital signs in last 24 hours:    Temp:  [97.5 °F (36.4 °C)-98.3 °F (36.8 °C)] 97.5 °F (36.4 °C)  HR:  [77-84] 77  Resp:  [16] 16  BP: (121-123)/(64-69) 121/64    Laboratory results: I have personally reviewed all pertinent laboratory/tests results    Results from the past 24 hours: No results found for this or any previous visit (from the past 24 hour(s)).  Most Recent Labs:   Lab Results   Component Value Date    WBC 4.42 02/01/2024    RBC 4.88 02/01/2024    HGB 15.3 02/01/2024    HCT 44.4 02/01/2024     02/01/2024    RDW 11.9 02/01/2024    NEUTROABS 2.87 02/01/2024    SODIUM 141 02/07/2024    K 4.0 02/07/2024     02/07/2024    CO2 31 02/07/2024    BUN 20 02/07/2024    CREATININE 1.10 02/07/2024    GLUC 79 02/07/2024    CALCIUM 9.4 02/07/2024    AST 12 (L) 02/07/2024    ALT 9 02/07/2024    ALKPHOS 37 02/07/2024    TP 5.9 (L) 02/07/2024    ALB 4.0 02/07/2024    TBILI 0.43 02/07/2024    [id number] 0.968 02/01/2024       Progress Toward Goals: progressing    Assessment/Plan   Active Problems:    Marijuana abuse    Anxiety    Insomnia      Recommended Treatment:     Planned medication and treatment changes:    All current active medications have been reviewed  Encourage group therapy, milieu therapy and occupational therapy  Behavioral Health checks every 7 minutes    For now, continue current medications.  Seems to be slowly improving on current regimen but still struggling with ongoing anxiety symptoms.  Consider increase in Lexapro in future.  Otherwise, continue with rehab placement for ongoing stability and abstinence.     Current Facility-Administered Medications   Medication Dose Route  Frequency Provider Last Rate    acetaminophen  650 mg Oral Q6H PRN Zakia Resendez, DO      acetaminophen  650 mg Oral Q4H PRN Zakia Resendez, DO      acetaminophen  975 mg Oral Q6H PRN Zakia Resendez, DO      aluminum-magnesium hydroxide-simethicone  30 mL Oral Q4H PRN Zakia Resendez, DO      ARIPiprazole  5 mg Oral HS Gregory Bennett MD      haloperidol lactate  2.5 mg Intramuscular Q4H PRN Max 4/day Zakia Resendez, DO      And    LORazepam  1 mg Intramuscular Q4H PRN Max 4/day Zakia Resendez, DO      And    benztropine  0.5 mg Intramuscular Q4H PRN Max 4/day Zakia Resendez, DO      haloperidol lactate  5 mg Intramuscular Q4H PRN Max 4/day Zakia Resendez, DO      And    LORazepam  2 mg Intramuscular Q4H PRN Max 4/day Zakia Resendez, DO      And    benztropine  1 mg Intramuscular Q4H PRN Max 4/day Zakia Resendez, DO      benztropine  1 mg Intramuscular Q4H PRN Max 6/day Zakia Resendez, DO      benztropine  1 mg Oral Q4H PRN Max 6/day Zakia Resendez, DO      hydrOXYzine HCL  50 mg Oral Q6H PRN Max 4/day Zakia Resendez, DO      Or    diphenhydrAMINE  50 mg Intramuscular Q6H PRN Zakia Resendez, DO      escitalopram  15 mg Oral Daily Kylah Cason, DO      Artificial Tears  1 drop Both Eyes Q3H PRN Zakia Resendez, DO      haloperidol  1 mg Oral Q6H PRN Zakia Resendez, DO      haloperidol  2.5 mg Oral Q4H PRN Max 4/day Zakia Resendez, DO      haloperidol  5 mg Oral Q4H PRN Max 4/day Zakia Resendez, DO      hydrOXYzine HCL  100 mg Oral Q6H PRN Max 4/day Zakia Resendez, DO      Or    LORazepam  2 mg Intramuscular Q6H PRN Zakia Resendez, DO      hydrOXYzine HCL  25 mg Oral Q6H PRN Max 4/day Zakia Resendez, DO      hydrOXYzine HCL  75 mg Oral TID Gregory Bennett MD      melatonin  6 mg Oral HS Kylah Cason, DO      mirtazapine  7.5 mg Oral HS PRN Gregory Bennett MD      mirtazapine  7.5 mg Oral HS Gregory Bennett MD      polyethylene glycol  17 g Oral Daily PRN Zakia Resendez DO      propranolol  10  mg Oral Q8H PRN Zakia Resendez DO      senna-docusate sodium  1 tablet Oral Daily PRN Zakia Resendez,        Risks / Benefits of Treatment:    Risks, benefits, and possible side effects of medications explained to patient and patient verbalizes understanding and agreement for treatment.    Counseling / Coordination of Care:    Total floor / unit time spent today 20 minutes. Greater than 50% of total time was spent with the patient and / or family counseling and / or coordination of care. A description of counseling / coordination of care:  Patient's progress discussed with staff in treatment team meeting.  Medications, treatment progress and treatment plan reviewed with patient.    Valarie Tucker PA-C 03/08/24

## 2024-03-08 NOTE — NURSING NOTE
Pt withdrawn to self. Denies all psych symptoms. Cooperative with shift assessment. Making needs known. Continued q7m checks for safety. Compliant with evening meds.     2125 pt report relief from prn maalox. Med effective.

## 2024-03-08 NOTE — PLAN OF CARE
Problem: Ineffective Coping  Goal: Demonstrates healthy coping skills  Outcome: Not Progressing  Goal: Participates in unit activities  Description: Interventions:  - Provide therapeutic environment   - Provide required programming   - Redirect inappropriate behaviors   Outcome: Not Progressing  Goal: Patient/Family verbalizes awareness of resources  Outcome: Not Progressing     Problem: Anxiety  Goal: Anxiety is at manageable level  Description: Interventions:  - Assess and monitor patient's anxiety level.   - Monitor for signs and symptoms (heart palpitations, chest pain, shortness of breath, headaches, nausea, feeling jumpy, restlessness, irritable, apprehensive).   - Collaborate with interdisciplinary team and initiate plan and interventions as ordered.  - [place] patient to unit/surroundings  - Explain treatment plan  - Encourage participation in care  - Encourage verbalization of concerns/fears  - Identify coping mechanisms  - Assist in developing anxiety-reducing skills  - Administer/offer alternative therapies  - Limit or eliminate stimulants  Outcome: Not Progressing

## 2024-03-09 PROCEDURE — 99232 SBSQ HOSP IP/OBS MODERATE 35: CPT | Performed by: PSYCHIATRY & NEUROLOGY

## 2024-03-09 RX ADMIN — HYDROXYZINE HYDROCHLORIDE 75 MG: 25 TABLET ORAL at 08:55

## 2024-03-09 RX ADMIN — MELATONIN TAB 3 MG 6 MG: 3 TAB at 21:11

## 2024-03-09 RX ADMIN — MIRTAZAPINE 7.5 MG: 7.5 TABLET, FILM COATED ORAL at 21:11

## 2024-03-09 RX ADMIN — HYDROXYZINE HYDROCHLORIDE 75 MG: 25 TABLET ORAL at 17:51

## 2024-03-09 RX ADMIN — HYDROXYZINE HYDROCHLORIDE 75 MG: 25 TABLET ORAL at 21:11

## 2024-03-09 RX ADMIN — ARIPIPRAZOLE 5 MG: 5 TABLET ORAL at 21:10

## 2024-03-09 RX ADMIN — ESCITALOPRAM OXALATE 15 MG: 5 TABLET, FILM COATED ORAL at 08:55

## 2024-03-09 NOTE — PLAN OF CARE
Problem: Ineffective Coping  Goal: Identifies healthy coping skills  Outcome: Not Progressing  Goal: Demonstrates healthy coping skills  Outcome: Not Progressing  Goal: Patient/Family verbalizes awareness of resources  Outcome: Not Progressing  Goal: Understands least restrictive measures  Description: Interventions:  - Utilize least restrictive behavior  Outcome: Not Progressing     Problem: Ineffective Coping  Goal: Identifies healthy coping skills  Outcome: Not Progressing  Goal: Demonstrates healthy coping skills  Outcome: Not Progressing  Goal: Patient/Family verbalizes awareness of resources  Outcome: Not Progressing  Goal: Understands least restrictive measures  Description: Interventions:  - Utilize least restrictive behavior  Outcome: Not Progressing

## 2024-03-09 NOTE — NURSING NOTE
PT remains visible in the unit with other peers observed in the Dayroom. PT denies SI/HI/VH/AH, no depression nor anxiety report. Compliant with HS medications, no PRNs needed during this shift.  Appears comfortable without any distress during this shift.

## 2024-03-09 NOTE — PROGRESS NOTES
Progress Note - Behavioral Health   Shaquille Ackerman 28 y.o. male MRN: [id number]  Unit/Bed#: [facility] 346-02 Encounter: [id number]    Assessment:  Principal Problem:    Anxiety  Active Problems:    Marijuana abuse    Insomnia      Plan:  --Continue with psychiatric hospitalization  --Continue with individual, group, and milieu therapy  --Continue the following medications:  Current Facility-Administered Medications   Medication Dose Route Frequency    acetaminophen (TYLENOL) tablet 650 mg  650 mg Oral Q6H PRN    acetaminophen (TYLENOL) tablet 650 mg  650 mg Oral Q4H PRN    acetaminophen (TYLENOL) tablet 975 mg  975 mg Oral Q6H PRN    aluminum-magnesium hydroxide-simethicone (MAALOX) oral suspension 30 mL  30 mL Oral Q4H PRN    ARIPiprazole (ABILIFY) tablet 5 mg  5 mg Oral HS    haloperidol lactate (HALDOL) injection 2.5 mg  2.5 mg Intramuscular Q4H PRN Max 4/day    And    LORazepam (ATIVAN) injection 1 mg  1 mg Intramuscular Q4H PRN Max 4/day    And    benztropine (COGENTIN) injection 0.5 mg  0.5 mg Intramuscular Q4H PRN Max 4/day    haloperidol lactate (HALDOL) injection 5 mg  5 mg Intramuscular Q4H PRN Max 4/day    And    LORazepam (ATIVAN) injection 2 mg  2 mg Intramuscular Q4H PRN Max 4/day    And    benztropine (COGENTIN) injection 1 mg  1 mg Intramuscular Q4H PRN Max 4/day    benztropine (COGENTIN) injection 1 mg  1 mg Intramuscular Q4H PRN Max 6/day    benztropine (COGENTIN) tablet 1 mg  1 mg Oral Q4H PRN Max 6/day    hydrOXYzine HCL (ATARAX) tablet 50 mg  50 mg Oral Q6H PRN Max 4/day    Or    diphenhydrAMINE (BENADRYL) injection 50 mg  50 mg Intramuscular Q6H PRN    escitalopram (LEXAPRO) tablet 15 mg  15 mg Oral Daily    glycerin-hypromellose- (ARTIFICIAL TEARS) ophthalmic solution 1 drop  1 drop Both Eyes Q3H PRN    haloperidol (HALDOL) tablet 1 mg  1 mg Oral Q6H PRN    haloperidol (HALDOL) tablet 2.5 mg  2.5 mg Oral Q4H PRN Max 4/day    haloperidol (HALDOL) tablet 5 mg  5 mg Oral Q4H PRN Max 4/day     "hydrOXYzine HCL (ATARAX) tablet 100 mg  100 mg Oral Q6H PRN Max 4/day    Or    LORazepam (ATIVAN) injection 2 mg  2 mg Intramuscular Q6H PRN    hydrOXYzine HCL (ATARAX) tablet 25 mg  25 mg Oral Q6H PRN Max 4/day    hydrOXYzine HCL (ATARAX) tablet 75 mg  75 mg Oral TID    melatonin tablet 6 mg  6 mg Oral HS    mirtazapine (REMERON) tablet 7.5 mg  7.5 mg Oral HS PRN    mirtazapine (REMERON) tablet 7.5 mg  7.5 mg Oral HS    polyethylene glycol (MIRALAX) packet 17 g  17 g Oral Daily PRN    propranolol (INDERAL) tablet 10 mg  10 mg Oral Q8H PRN    senna-docusate sodium (SENOKOT S) 8.6-50 mg per tablet 1 tablet  1 tablet Oral Daily PRN       Subjective: Patient was seen for continuation of care. Chart was reviewed and discussed with treatment team.     No acute behavioral events over the past 24 hours. Today, patient was seen and examined at bedside for continuation of care.     Patient is feeling anxious today.  Last evening, he had a bad phone call with his mother where he became agitated but despite being agitated was redirectable by staff.  He is having a \"better day today.\"  He denies side effects to his medications and otherwise denies all acute psychiatric symptoms.  He has not had lab work in approximately 1 month and we will reorder a CMP and CBC.    Patient denied adverse effects to their psychiatric medication regimen. Patient denied other new or worsening psychiatric symptoms/complaints at this time. Discussed the importance of continuing to take medications as prescribed, as well as the importance of continuing to attend groups on the unit.     Psychiatric Review of Systems:  Medication adverse effects: none  Sleep: unchanged  Appetite: unchanged  Behavior over the past 24 hours: as per above    Vitals:  Vitals:    03/09/24 0728   BP: 123/67   Pulse: 71   Resp: 16   Temp: 98.1 °F (36.7 °C)   SpO2: 98%       Laboratory results:    I have personally reviewed all pertinent laboratory/tests results  No results " "found for this or any previous visit (from the past 48 hour(s)).     Current Medications:  Current medications as per above. All medications have been reviewed.   Risks, benefits, alternatives, and possible side effects of patient's psychiatric medications were discussed with patient.     Mental Status Evaluation:  Appearance: moderately kempt  Motor: +psychomotor agitation  Behavior: cooperative, pleasant  Speech: normal rate, rhythm, and volume  Mood: \"anxious\"  Affect: mood-congruent, anxious appearing  Thought Process: organized and linear  Thought Content: denies auditory hallucinations, denies visual hallucinations, denies delusions  Risk Potential: denies suicidal ideation, plan, or intent. Denies homicidal ideation  Sensorium: Oriented to person, place, time, and situation  Cognition: cognitive ability appears intact but was not quantitatively tested  Consciousness: alert and awake  Attention: currently intact  Insight: fair  Judgement: fair      Progress Toward Goals & Illness Status: Patient is not at goal. They are not yet ready for discharge. The patient's condition currently requires active psychopharmacological medication management, interdisciplinary coordination with case management, and the utilization of adjunctive milieu and group therapy to augment psychopharmacological efficacy. The patient's risk of morbidity, and progression or decompensation of psychiatric disease, is higher without this current treatment.       This note has been constructed using a voice recognition system. There may be translation, syntax, or grammatical errors. If you have any questions, please contact the dictating provider.    "

## 2024-03-09 NOTE — NURSING NOTE
Patient was assessed in room during medication administration, patient reports no AVH/SI/HI. Patient has had no behavioral issues to note. Patient has been med compliant. Patient states they have no further needs at this time. Patient reports minimal depression and anxiety is controlled.

## 2024-03-10 LAB
ALBUMIN SERPL BCP-MCNC: 4.1 G/DL (ref 3.5–5)
ALP SERPL-CCNC: 40 U/L (ref 34–104)
ALT SERPL W P-5'-P-CCNC: 13 U/L (ref 7–52)
ANION GAP SERPL CALCULATED.3IONS-SCNC: 7 MMOL/L
AST SERPL W P-5'-P-CCNC: 14 U/L (ref 13–39)
BASOPHILS # BLD AUTO: 0.04 THOUSANDS/ÂΜL (ref 0–0.1)
BASOPHILS NFR BLD AUTO: 1 % (ref 0–1)
BILIRUB SERPL-MCNC: 0.47 MG/DL (ref 0.2–1)
BUN SERPL-MCNC: 20 MG/DL (ref 5–25)
CALCIUM SERPL-MCNC: 9.5 MG/DL (ref 8.4–10.2)
CHLORIDE SERPL-SCNC: 105 MMOL/L (ref 96–108)
CO2 SERPL-SCNC: 29 MMOL/L (ref 21–32)
CREAT SERPL-MCNC: 1.03 MG/DL (ref 0.6–1.3)
EOSINOPHIL # BLD AUTO: 0.05 THOUSAND/ÂΜL (ref 0–0.61)
EOSINOPHIL NFR BLD AUTO: 1 % (ref 0–6)
ERYTHROCYTE [DISTWIDTH] IN BLOOD BY AUTOMATED COUNT: 11.4 % (ref 11.6–15.1)
GFR SERPL CREATININE-BSD FRML MDRD: 97 ML/MIN/1.73SQ M
GLUCOSE P FAST SERPL-MCNC: 89 MG/DL (ref 65–99)
GLUCOSE SERPL-MCNC: 89 MG/DL (ref 65–140)
HCT VFR BLD AUTO: 41.8 % (ref 36.5–49.3)
HGB BLD-MCNC: 14.5 G/DL (ref 12–17)
IMM GRANULOCYTES # BLD AUTO: 0.05 THOUSAND/UL (ref 0–0.2)
IMM GRANULOCYTES NFR BLD AUTO: 1 % (ref 0–2)
LYMPHOCYTES # BLD AUTO: 2.24 THOUSANDS/ÂΜL (ref 0.6–4.47)
LYMPHOCYTES NFR BLD AUTO: 26 % (ref 14–44)
MCH RBC QN AUTO: 31.9 PG (ref 26.8–34.3)
MCHC RBC AUTO-ENTMCNC: 34.7 G/DL (ref 31.4–37.4)
MCV RBC AUTO: 92 FL (ref 82–98)
MONOCYTES # BLD AUTO: 0.68 THOUSAND/ÂΜL (ref 0.17–1.22)
MONOCYTES NFR BLD AUTO: 8 % (ref 4–12)
NEUTROPHILS # BLD AUTO: 5.59 THOUSANDS/ÂΜL (ref 1.85–7.62)
NEUTS SEG NFR BLD AUTO: 63 % (ref 43–75)
NRBC BLD AUTO-RTO: 0 /100 WBCS
PLATELET # BLD AUTO: 201 THOUSANDS/UL (ref 149–390)
PMV BLD AUTO: 9.1 FL (ref 8.9–12.7)
POTASSIUM SERPL-SCNC: 4.2 MMOL/L (ref 3.5–5.3)
PROT SERPL-MCNC: 6.1 G/DL (ref 6.4–8.4)
RBC # BLD AUTO: 4.54 MILLION/UL (ref 3.88–5.62)
SODIUM SERPL-SCNC: 141 MMOL/L (ref 135–147)
WBC # BLD AUTO: 8.65 THOUSAND/UL (ref 4.31–10.16)

## 2024-03-10 PROCEDURE — 99232 SBSQ HOSP IP/OBS MODERATE 35: CPT | Performed by: PSYCHIATRY & NEUROLOGY

## 2024-03-10 PROCEDURE — 80053 COMPREHEN METABOLIC PANEL: CPT | Performed by: PSYCHIATRY & NEUROLOGY

## 2024-03-10 PROCEDURE — 85025 COMPLETE CBC W/AUTO DIFF WBC: CPT | Performed by: PSYCHIATRY & NEUROLOGY

## 2024-03-10 RX ADMIN — HYDROXYZINE HYDROCHLORIDE 75 MG: 25 TABLET ORAL at 21:24

## 2024-03-10 RX ADMIN — HYDROXYZINE HYDROCHLORIDE 75 MG: 25 TABLET ORAL at 17:00

## 2024-03-10 RX ADMIN — ESCITALOPRAM OXALATE 15 MG: 5 TABLET, FILM COATED ORAL at 09:55

## 2024-03-10 RX ADMIN — MIRTAZAPINE 7.5 MG: 7.5 TABLET, FILM COATED ORAL at 21:24

## 2024-03-10 RX ADMIN — HYDROXYZINE HYDROCHLORIDE 75 MG: 25 TABLET ORAL at 09:55

## 2024-03-10 RX ADMIN — MELATONIN TAB 3 MG 6 MG: 3 TAB at 21:24

## 2024-03-10 RX ADMIN — ARIPIPRAZOLE 5 MG: 5 TABLET ORAL at 21:24

## 2024-03-10 NOTE — NURSING NOTE
"Patient remained visible on the unit throughput the evening. Cooperative with routine. HS medication compliant. Denied any unmet needs.     Before bed, patient requested to speak. He reported concern regarding a discussion he had with a female peer. He presented as anxious and irritated. He stated \"I never asked her to have sex with me. You know, Audi. I never said that. I had an autistic moment. I am scared they are going to send me to the other side.\" Patient encouraged to not engage with female peer, as well as, to not call other unit requesting to speak with patients. Patient verbalized understanding. No further intervention need.   "

## 2024-03-10 NOTE — NURSING NOTE
"Patient reportedly called peer on other unit, and had an inappropriate conversation. Patient made inappropriate comments to peer. Patient informed of behavioral expectations. Patient is apologetic and stated \"It was dumb. I won't do it again. I don't know what I was thinking.\" Patient verbalized understanding and is remorseful.   "

## 2024-03-10 NOTE — NURSING NOTE
Patient was assessed in room during medication administration, patient reports no AVH/SI/HI. Patient has had no behavioral issues to note, and is appropriate on the unit with fellow peers. Patient has been med compliant. Patient states they have no further needs at this time.

## 2024-03-10 NOTE — PROGRESS NOTES
Progress Note - Behavioral Health   Shaquille Ackerman 28 y.o. male MRN: [id number]  Unit/Bed#: [facility] 346-02 Encounter: [id number]    Assessment:  Principal Problem:    Anxiety  Active Problems:    Marijuana abuse    Insomnia      Plan:  --Continue with psychiatric hospitalization  --Continue with individual, group, and milieu therapy  --Continue the following medications:  Current Facility-Administered Medications   Medication Dose Route Frequency    acetaminophen (TYLENOL) tablet 650 mg  650 mg Oral Q6H PRN    acetaminophen (TYLENOL) tablet 650 mg  650 mg Oral Q4H PRN    acetaminophen (TYLENOL) tablet 975 mg  975 mg Oral Q6H PRN    aluminum-magnesium hydroxide-simethicone (MAALOX) oral suspension 30 mL  30 mL Oral Q4H PRN    ARIPiprazole (ABILIFY) tablet 5 mg  5 mg Oral HS    haloperidol lactate (HALDOL) injection 2.5 mg  2.5 mg Intramuscular Q4H PRN Max 4/day    And    LORazepam (ATIVAN) injection 1 mg  1 mg Intramuscular Q4H PRN Max 4/day    And    benztropine (COGENTIN) injection 0.5 mg  0.5 mg Intramuscular Q4H PRN Max 4/day    haloperidol lactate (HALDOL) injection 5 mg  5 mg Intramuscular Q4H PRN Max 4/day    And    LORazepam (ATIVAN) injection 2 mg  2 mg Intramuscular Q4H PRN Max 4/day    And    benztropine (COGENTIN) injection 1 mg  1 mg Intramuscular Q4H PRN Max 4/day    benztropine (COGENTIN) injection 1 mg  1 mg Intramuscular Q4H PRN Max 6/day    benztropine (COGENTIN) tablet 1 mg  1 mg Oral Q4H PRN Max 6/day    hydrOXYzine HCL (ATARAX) tablet 50 mg  50 mg Oral Q6H PRN Max 4/day    Or    diphenhydrAMINE (BENADRYL) injection 50 mg  50 mg Intramuscular Q6H PRN    escitalopram (LEXAPRO) tablet 15 mg  15 mg Oral Daily    glycerin-hypromellose- (ARTIFICIAL TEARS) ophthalmic solution 1 drop  1 drop Both Eyes Q3H PRN    haloperidol (HALDOL) tablet 1 mg  1 mg Oral Q6H PRN    haloperidol (HALDOL) tablet 2.5 mg  2.5 mg Oral Q4H PRN Max 4/day    haloperidol (HALDOL) tablet 5 mg  5 mg Oral Q4H PRN Max 4/day     "hydrOXYzine HCL (ATARAX) tablet 100 mg  100 mg Oral Q6H PRN Max 4/day    Or    LORazepam (ATIVAN) injection 2 mg  2 mg Intramuscular Q6H PRN    hydrOXYzine HCL (ATARAX) tablet 25 mg  25 mg Oral Q6H PRN Max 4/day    hydrOXYzine HCL (ATARAX) tablet 75 mg  75 mg Oral TID    melatonin tablet 6 mg  6 mg Oral HS    mirtazapine (REMERON) tablet 7.5 mg  7.5 mg Oral HS PRN    mirtazapine (REMERON) tablet 7.5 mg  7.5 mg Oral HS    polyethylene glycol (MIRALAX) packet 17 g  17 g Oral Daily PRN    propranolol (INDERAL) tablet 10 mg  10 mg Oral Q8H PRN    senna-docusate sodium (SENOKOT S) 8.6-50 mg per tablet 1 tablet  1 tablet Oral Daily PRN       Subjective: Patient was seen for continuation of care. Chart was reviewed and discussed with treatment team.     Over the past 24 hours, the patient called a peer on an adjacent behavioral health unit at this hospital.  He had an inappropriate conversation with said peer and made references to events that took place on this unit whereby this other male peer engaged in sexual activity with a female patient on this current unit.  The patient then went up to said female peer on this behavioral health unit and had an inappropriate conversation about the details of that previous sexual encounter.  Staff redirected the patient and explained expectations and the patient described it as having \"an autistic moment.\"  Psychiatrically speaking, he is without complaint.  He states that he does not want to talk about the events that transpired yesterday and he is mildly anxious appearing regarding these events.  He is denying side effects to his medications and is otherwise without complaint at this time.    Patient denied adverse effects to their psychiatric medication regimen. Patient denied other new or worsening psychiatric symptoms/complaints at this time. Discussed the importance of continuing to take medications as prescribed, as well as the importance of continuing to attend groups on the " unit.     Psychiatric Review of Systems:  Medication adverse effects: none  Sleep: unchanged  Appetite: unchanged  Behavior over the past 24 hours: as per above    Vitals:  Vitals:    03/10/24 0802   BP: 120/62   Pulse: 76   Resp: 16   Temp: 97.9 °F (36.6 °C)   SpO2: 98%       Laboratory results:    I have personally reviewed all pertinent laboratory/tests results  Recent Results (from the past 48 hour(s))   CBC and differential    Collection Time: 03/10/24  5:30 AM   Result Value Ref Range    WBC 8.65 4.31 - 10.16 Thousand/uL    RBC 4.54 3.88 - 5.62 Million/uL    Hemoglobin 14.5 12.0 - 17.0 g/dL    Hematocrit 41.8 36.5 - 49.3 %    MCV 92 82 - 98 fL    MCH 31.9 26.8 - 34.3 pg    MCHC 34.7 31.4 - 37.4 g/dL    RDW 11.4 (L) 11.6 - 15.1 %    MPV 9.1 8.9 - 12.7 fL    Platelets 201 149 - 390 Thousands/uL    nRBC 0 /100 WBCs    Neutrophils Relative 63 43 - 75 %    Immat GRANS % 1 0 - 2 %    Lymphocytes Relative 26 14 - 44 %    Monocytes Relative 8 4 - 12 %    Eosinophils Relative 1 0 - 6 %    Basophils Relative 1 0 - 1 %    Neutrophils Absolute 5.59 1.85 - 7.62 Thousands/µL    Immature Grans Absolute 0.05 0.00 - 0.20 Thousand/uL    Lymphocytes Absolute 2.24 0.60 - 4.47 Thousands/µL    Monocytes Absolute 0.68 0.17 - 1.22 Thousand/µL    Eosinophils Absolute 0.05 0.00 - 0.61 Thousand/µL    Basophils Absolute 0.04 0.00 - 0.10 Thousands/µL   Comprehensive metabolic panel    Collection Time: 03/10/24  5:30 AM   Result Value Ref Range    Sodium 141 135 - 147 mmol/L    Potassium 4.2 3.5 - 5.3 mmol/L    Chloride 105 96 - 108 mmol/L    CO2 29 21 - 32 mmol/L    ANION GAP 7 mmol/L    BUN 20 5 - 25 mg/dL    Creatinine 1.03 0.60 - 1.30 mg/dL    Glucose 89 65 - 140 mg/dL    Glucose, Fasting 89 65 - 99 mg/dL    Calcium 9.5 8.4 - 10.2 mg/dL    AST 14 13 - 39 U/L    ALT 13 7 - 52 U/L    Alkaline Phosphatase 40 34 - 104 U/L    Total Protein 6.1 (L) 6.4 - 8.4 g/dL    Albumin 4.1 3.5 - 5.0 g/dL    Total Bilirubin 0.47 0.20 - 1.00 mg/dL    eGFR  "97 ml/min/1.73sq m        Current Medications:  Current medications as per above. All medications have been reviewed.   Risks, benefits, alternatives, and possible side effects of patient's psychiatric medications were discussed with patient.     Mental Status Evaluation:  Appearance: moderately kempt  Motor: +psychomotor agitation  Behavior: cooperative, pleasant  Speech: normal rate, rhythm, and volume  Mood: \"I dont want to talk about it\"  Affect: mood-congruent, anxious appearing  Thought Process: organized and linear  Thought Content: denies auditory hallucinations, denies visual hallucinations, denies delusions  Risk Potential: denies suicidal ideation, plan, or intent. Denies homicidal ideation  Sensorium: Oriented to person, place, time, and situation  Cognition: cognitive ability appears intact but was not quantitatively tested  Consciousness: alert and awake  Attention: currently intact  Insight: fair  Judgement: fair      Progress Toward Goals & Illness Status: Patient is not at goal. They are not yet ready for discharge. The patient's condition currently requires active psychopharmacological medication management, interdisciplinary coordination with case management, and the utilization of adjunctive milieu and group therapy to augment psychopharmacological efficacy. The patient's risk of morbidity, and progression or decompensation of psychiatric disease, is higher without this current treatment.       This note has been constructed using a voice recognition system. There may be translation, syntax, or grammatical errors. If you have any questions, please contact the dictating provider.    "

## 2024-03-10 NOTE — PLAN OF CARE
Problem: Depression  Goal: Treatment Goal: Demonstrate behavioral control of depressive symptoms, verbalize feelings of improved mood/affect, and adopt new coping skills prior to discharge  Outcome: Progressing  Goal: Verbalize thoughts and feelings  Description: Interventions:  - Assess and re-assess patient's level of risk   - Engage patient in 1:1 interactions, daily, for a minimum of 15 minutes   - Encourage patient to express feelings, fears, frustrations, hopes   Outcome: Progressing  Goal: Refrain from harming self  Description: Interventions:  - Monitor patient closely, per order   - Supervise medication ingestion, monitor effects and side effects   Outcome: Progressing  Goal: Refrain from isolation  Description: Interventions:  - Develop a trusting relationship   - Encourage socialization   Outcome: Progressing  Goal: Refrain from self-neglect  Outcome: Progressing  Goal: Attend and participate in unit activities, including therapeutic, recreational, and educational groups  Description: Interventions:  - Provide therapeutic and educational activities daily, encourage attendance and participation, and document same in the medical record   Outcome: Progressing  Goal: Complete daily ADLs, including personal hygiene independently, as able  Description: Interventions:  - Observe, teach, and assist patient with ADLS  -  Monitor and promote a balance of rest/activity, with adequate nutrition and elimination   Outcome: Progressing     Problem: Anxiety  Goal: Anxiety is at manageable level  Description: Interventions:  - Assess and monitor patient's anxiety level.   - Monitor for signs and symptoms (heart palpitations, chest pain, shortness of breath, headaches, nausea, feeling jumpy, restlessness, irritable, apprehensive).   - Collaborate with interdisciplinary team and initiate plan and interventions as ordered.  - [place] patient to unit/surroundings  - Explain treatment plan  - Encourage participation in  care  - Encourage verbalization of concerns/fears  - Identify coping mechanisms  - Assist in developing anxiety-reducing skills  - Administer/offer alternative therapies  - Limit or eliminate stimulants  Outcome: Progressing

## 2024-03-11 VITALS
SYSTOLIC BLOOD PRESSURE: 115 MMHG | OXYGEN SATURATION: 97 % | BODY MASS INDEX: 18.09 KG/M2 | TEMPERATURE: 98.7 F | WEIGHT: 126.4 LBS | HEIGHT: 70 IN | RESPIRATION RATE: 16 BRPM | HEART RATE: 81 BPM | DIASTOLIC BLOOD PRESSURE: 69 MMHG

## 2024-03-11 PROCEDURE — 99238 HOSP IP/OBS DSCHRG MGMT 30/<: CPT | Performed by: STUDENT IN AN ORGANIZED HEALTH CARE EDUCATION/TRAINING PROGRAM

## 2024-03-11 RX ORDER — ARIPIPRAZOLE 5 MG/1
5 TABLET ORAL
Qty: 30 TABLET | Refills: 1 | Status: SHIPPED | OUTPATIENT
Start: 2024-03-11

## 2024-03-11 RX ADMIN — HYDROXYZINE HYDROCHLORIDE 75 MG: 25 TABLET ORAL at 08:45

## 2024-03-11 RX ADMIN — ESCITALOPRAM OXALATE 15 MG: 5 TABLET, FILM COATED ORAL at 08:46

## 2024-03-11 RX ADMIN — HYDROXYZINE HYDROCHLORIDE 75 MG: 25 TABLET ORAL at 16:50

## 2024-03-11 NOTE — PLAN OF CARE
Problem: DISCHARGE PLANNING  Goal: Discharge to home or other facility with appropriate resources  Description: INTERVENTIONS:  - Identify barriers to discharge w/patient and caregiver  - Arrange for needed discharge resources and transportation as appropriate  - Identify discharge learning needs (meds, wound care, etc.)  - Arrange for interpretive services to assist at discharge as needed  - Refer to Case Management Department for coordinating discharge planning if the patient needs post-hospital services based on physician/advanced practitioner order or complex needs related to functional status, cognitive ability, or social support system  Outcome: Adequate for Discharge    The patient is cleared for discharge today. The patient will discharge at 17h15 via [facility] to [facility]. The patient is in agreement with his discharge plan. The patient's medications have been faxed to [facility] as per the request of [facility]. [facility] will assist the patient in coordinating follow up care. Additional resources were put onto the patient's AVS including the crisis line.

## 2024-03-11 NOTE — NURSING NOTE
Patient is calm and cooperative upon approach. Patient visible in dayroom, socializing with selective peers. Patient denies SI/HI/AH/VH. Patient reported moderate anxiety, no PRNs needed at this time. Patient attending groups. Patient is compliant with meds and meals.

## 2024-03-11 NOTE — PROGRESS NOTES
03/11/24 1528   Team Meeting   Meeting Type Daily Rounds   Team Members Present   Team Members Present Physician;Nurse;   Physician Team Member Sabrina   Nursing Team Member Alta Angulo   Care Management Team Member Liza/Sallie   Patient/Family Present   Patient Present No   Patient's Family Present No     201, Denies SI/HI/AH/VH.  Possible D/C early this week with referral to [facility].

## 2024-03-11 NOTE — CASE MANAGEMENT
CM called the [facility] program to refer the patient to [facility]. HOST will call the nurse's station to speak to the patient directly for consent of services. CM sent the patient's clinical to [facility] via fax to . CM will await a response on bed search/admission.

## 2024-03-11 NOTE — PLAN OF CARE
Problem: Ineffective Coping  Goal: Identifies ineffective coping skills  Outcome: Completed  Goal: Identifies healthy coping skills  Outcome: Completed  Goal: Demonstrates healthy coping skills  Outcome: Completed  Goal: Participates in unit activities  Description: Interventions:  - Provide therapeutic environment   - Provide required programming   - Redirect inappropriate behaviors   Outcome: Completed  Goal: Patient/Family participate in treatment and DC plans  Description: Interventions:  - Provide therapeutic environment  Outcome: Completed  Goal: Patient/Family verbalizes awareness of resources  Outcome: Completed  Goal: Understands least restrictive measures  Description: Interventions:  - Utilize least restrictive behavior  Outcome: Completed  Goal: Free from restraint events  Description: - Utilize least restrictive measures   - Provide behavioral interventions   - Redirect inappropriate behaviors   Outcome: Completed     Problem: Depression  Goal: Treatment Goal: Demonstrate behavioral control of depressive symptoms, verbalize feelings of improved mood/affect, and adopt new coping skills prior to discharge  Outcome: Completed  Goal: Verbalize thoughts and feelings  Description: Interventions:  - Assess and re-assess patient's level of risk   - Engage patient in 1:1 interactions, daily, for a minimum of 15 minutes   - Encourage patient to express feelings, fears, frustrations, hopes   Outcome: Completed  Goal: Refrain from harming self  Description: Interventions:  - Monitor patient closely, per order   - Supervise medication ingestion, monitor effects and side effects   Outcome: Completed  Goal: Refrain from isolation  Description: Interventions:  - Develop a trusting relationship   - Encourage socialization   Outcome: Completed  Goal: Refrain from self-neglect  Outcome: Completed  Goal: Attend and participate in unit activities, including therapeutic, recreational, and educational groups  Description:  Interventions:  - Provide therapeutic and educational activities daily, encourage attendance and participation, and document same in the medical record   Outcome: Completed  Goal: Complete daily ADLs, including personal hygiene independently, as able  Description: Interventions:  - Observe, teach, and assist patient with ADLS  -  Monitor and promote a balance of rest/activity, with adequate nutrition and elimination   Outcome: Completed     Problem: Anxiety  Goal: Anxiety is at manageable level  Description: Interventions:  - Assess and monitor patient's anxiety level.   - Monitor for signs and symptoms (heart palpitations, chest pain, shortness of breath, headaches, nausea, feeling jumpy, restlessness, irritable, apprehensive).   - Collaborate with interdisciplinary team and initiate plan and interventions as ordered.  - [place] patient to unit/surroundings  - Explain treatment plan  - Encourage participation in care  - Encourage verbalization of concerns/fears  - Identify coping mechanisms  - Assist in developing anxiety-reducing skills  - Administer/offer alternative therapies  - Limit or eliminate stimulants  Outcome: Completed     Problem: Nutrition/Hydration-ADULT  Goal: Nutrient/Hydration intake appropriate for improving, restoring or maintaining nutritional needs  Description: Monitor and assess patient's nutrition/hydration status for malnutrition. Collaborate with interdisciplinary team and initiate plan and interventions as ordered.  Monitor patient's weight and dietary intake as ordered or per policy. Utilize nutrition screening tool and intervene as necessary. Determine patient's food preferences and provide high-protein, high-caloric foods as appropriate.     INTERVENTIONS:  - Monitor oral intake, urinary output, labs, and treatment plans  - Assess nutrition and hydration status and recommend course of action  - Evaluate amount of meals eaten  - Assist patient with eating if necessary   - Allow adequate  time for meals  - Recommend/ encourage appropriate diets, oral nutritional supplements, and vitamin/mineral supplements  - Order, calculate, and assess calorie counts as needed  - Recommend, monitor, and adjust tube feedings and TPN/PPN based on assessed needs  - Assess need for intravenous fluids  - Provide specific nutrition/hydration education as appropriate  - Include patient/family/caregiver in decisions related to nutrition  Outcome: Completed     Problem: DISCHARGE PLANNING  Goal: Discharge to home or other facility with appropriate resources  Description: INTERVENTIONS:  - Identify barriers to discharge w/patient and caregiver  - Arrange for needed discharge resources and transportation as appropriate  - Identify discharge learning needs (meds, wound care, etc.)  - Arrange for interpretive services to assist at discharge as needed  - Refer to Case Management Department for coordinating discharge planning if the patient needs post-hospital services based on physician/advanced practitioner order or complex needs related to functional status, cognitive ability, or social support system  Outcome: Completed

## 2024-03-11 NOTE — PLAN OF CARE
Problem: Depression  Goal: Treatment Goal: Demonstrate behavioral control of depressive symptoms, verbalize feelings of improved mood/affect, and adopt new coping skills prior to discharge  Outcome: Progressing  Goal: Verbalize thoughts and feelings  Description: Interventions:  - Assess and re-assess patient's level of risk   - Engage patient in 1:1 interactions, daily, for a minimum of 15 minutes   - Encourage patient to express feelings, fears, frustrations, hopes   Outcome: Progressing  Goal: Refrain from harming self  Description: Interventions:  - Monitor patient closely, per order   - Supervise medication ingestion, monitor effects and side effects   Outcome: Progressing  Goal: Refrain from isolation  Description: Interventions:  - Develop a trusting relationship   - Encourage socialization   Outcome: Progressing  Goal: Refrain from self-neglect  Outcome: Progressing  Goal: Attend and participate in unit activities, including therapeutic, recreational, and educational groups  Description: Interventions:  - Provide therapeutic and educational activities daily, encourage attendance and participation, and document same in the medical record   Outcome: Progressing  Goal: Complete daily ADLs, including personal hygiene independently, as able  Description: Interventions:  - Observe, teach, and assist patient with ADLS  -  Monitor and promote a balance of rest/activity, with adequate nutrition and elimination   Outcome: Progressing     Problem: Anxiety  Goal: Anxiety is at manageable level  Description: Interventions:  - Assess and monitor patient's anxiety level.   - Monitor for signs and symptoms (heart palpitations, chest pain, shortness of breath, headaches, nausea, feeling jumpy, restlessness, irritable, apprehensive).   - Collaborate with interdisciplinary team and initiate plan and interventions as ordered.  - [place] patient to unit/surroundings  - Explain treatment plan  - Encourage participation in  care  - Encourage verbalization of concerns/fears  - Identify coping mechanisms  - Assist in developing anxiety-reducing skills  - Administer/offer alternative therapies  - Limit or eliminate stimulants  Outcome: Progressing

## 2024-03-11 NOTE — NURSING NOTE
Patient is in the community and social with peers. Patient remained in behavioral control. Patient denied SI/AVH at this time. Patient is compliant with scheduled medication (taken in apple sauce). Q7 observation maintained.

## 2024-03-11 NOTE — PLAN OF CARE
Problem: Ineffective Coping  Goal: Identifies healthy coping skills  Outcome: Progressing  Goal: Demonstrates healthy coping skills  Outcome: Progressing  Goal: Participates in unit activities  Description: Interventions:  - Provide therapeutic environment   - Provide required programming   - Redirect inappropriate behaviors   Outcome: Progressing   Patient is making progress in his recovery

## 2024-03-11 NOTE — CASE MANAGEMENT
Per [facility]OST, the patient has been accepted to [facility] and can admit to their program today. [facility] reports they are unable to provide transportation. [facility]AN scheduled the patient for secure psych transport for 17h15.    Per [facility]'s request, CM faxed the patient's medication prescriptions to [facility], [phone].

## 2024-03-11 NOTE — NURSING NOTE
Patient awoke and completed all morning routines.  AVS printed and reviewed with Patient.  Patient verbalized understanding of discharge instructions and agreed to be compliant with their medication regimen.  Patient discharged to rehab at 1759.  Patient was discharged with all of their belongings at time of discharge.

## 2025-07-15 NOTE — NURSING NOTE
Also refer to [facility] Pt visible on unit throughout evening, has constricted affect, social with select peers. Pt appears guarded and anxious when approached but is calm and cooperative during interactions and endorses needs. Pt denies SI/HI/AH/VH. Medication adherent.    Pt given PRN 650mg tylenol @ 2030 for 6/10 headache; effective per pt.